# Patient Record
Sex: FEMALE | Race: WHITE | Employment: OTHER | URBAN - NONMETROPOLITAN AREA
[De-identification: names, ages, dates, MRNs, and addresses within clinical notes are randomized per-mention and may not be internally consistent; named-entity substitution may affect disease eponyms.]

---

## 2020-03-31 ENCOUNTER — HOSPITAL ENCOUNTER (OUTPATIENT)
Dept: CT IMAGING | Age: 60
Discharge: HOME | End: 2020-03-31
Payer: MEDICAID

## 2020-03-31 ENCOUNTER — TELEPHONE (OUTPATIENT)
Dept: NEUROSURGERY | Age: 60
End: 2020-03-31

## 2020-03-31 PROCEDURE — 3209999900 CT COMPARISON OF OUTSIDE FILMS

## 2020-05-05 ENCOUNTER — OFFICE VISIT (OUTPATIENT)
Dept: NEUROSURGERY | Age: 60
End: 2020-05-05
Payer: MEDICAID

## 2020-05-05 VITALS
DIASTOLIC BLOOD PRESSURE: 70 MMHG | HEIGHT: 65 IN | HEART RATE: 59 BPM | SYSTOLIC BLOOD PRESSURE: 118 MMHG | TEMPERATURE: 97.6 F | BODY MASS INDEX: 48.82 KG/M2 | WEIGHT: 293 LBS

## 2020-05-05 PROCEDURE — G8427 DOCREV CUR MEDS BY ELIG CLIN: HCPCS | Performed by: NEUROLOGICAL SURGERY

## 2020-05-05 PROCEDURE — G8417 CALC BMI ABV UP PARAM F/U: HCPCS | Performed by: NEUROLOGICAL SURGERY

## 2020-05-05 PROCEDURE — 99204 OFFICE O/P NEW MOD 45 MIN: CPT | Performed by: NEUROLOGICAL SURGERY

## 2020-05-05 PROCEDURE — 1036F TOBACCO NON-USER: CPT | Performed by: NEUROLOGICAL SURGERY

## 2020-05-05 PROCEDURE — 3017F COLORECTAL CA SCREEN DOC REV: CPT | Performed by: NEUROLOGICAL SURGERY

## 2020-05-05 RX ORDER — ATORVASTATIN CALCIUM 20 MG/1
20 TABLET, FILM COATED ORAL NIGHTLY
COMMUNITY

## 2020-05-05 RX ORDER — LOSARTAN POTASSIUM 100 MG/1
50 TABLET ORAL DAILY
COMMUNITY
Start: 2020-05-01

## 2020-05-05 RX ORDER — DULOXETIN HYDROCHLORIDE 60 MG/1
60 CAPSULE, DELAYED RELEASE ORAL DAILY
Status: ON HOLD | COMMUNITY
Start: 2020-04-30 | End: 2021-03-18

## 2020-05-05 RX ORDER — AMLODIPINE BESYLATE 5 MG/1
5 TABLET ORAL DAILY
COMMUNITY
Start: 2020-04-12

## 2020-05-05 RX ORDER — LEVETIRACETAM 500 MG/1
1000 TABLET ORAL 2 TIMES DAILY
Status: ON HOLD | COMMUNITY
Start: 2020-04-16 | End: 2021-03-29 | Stop reason: HOSPADM

## 2020-05-05 SDOH — HEALTH STABILITY: MENTAL HEALTH: HOW OFTEN DO YOU HAVE A DRINK CONTAINING ALCOHOL?: NEVER

## 2020-05-05 ASSESSMENT — ENCOUNTER SYMPTOMS
BACK PAIN: 0
TROUBLE SWALLOWING: 1
ABDOMINAL PAIN: 0
CHEST TIGHTNESS: 0

## 2020-05-05 NOTE — PROGRESS NOTES
Advised pt of xray results [facility] PROFESSIONAL SERVS  [address] [place] [address]  Dept: [phone]  Dept Fax: [phone]      Patient Name:  Jere Ledezma  Visit Date:  5/5/2020    HPI:     Ms. Anabell Paulino is a 61 y.o. female that presents today at [facility] for evaluation of the following:      Chief Complaint   Patient presents with    Consultation     Seizure, Aneurysm        HPI     This is a 80-year-old female who has a past medical history significant for ruptured intra-cerebral aneurysm in 2019. Patient experienced that brain aneurysm rupture while she is in [place] visiting her son . Apparently she underwent an endovascular treatment for her aneurysm. Patient aneurysm rupture was complicated by hydrocephalus and for that reason she underwent placement of  shunt. After she moved back to [place] she started to follow-up with her neurologist requested for her new brain CTA that showed: For that reason, he was referred to neurosurgery clinic. In today evaluation, patient denied any significant complaints. Patient denied any significant headache, pain in her vision or seizure activity at this time. Also patient denied any significant focal weakness or numbness. Patient's has a family history      Medications:  No current outpatient medications on file. The patient has no allergies on file. Past Medical History  Dagmar Shin  has no past medical history on file. Past Surgical History  The patient  has no past surgical history on file. Family History  This patient's family history is not on file. Social History  Domonique      Subjective:      Review of Systems   Constitutional: Negative for activity change. HENT: Positive for trouble swallowing (sometimes ). Eyes: Negative for visual disturbance. Respiratory: Negative for chest tightness. Cardiovascular: Negative for chest pain.    Gastrointestinal:

## 2021-03-17 ENCOUNTER — APPOINTMENT (OUTPATIENT)
Dept: CT IMAGING | Age: 61
DRG: 022 | End: 2021-03-17
Payer: MEDICAID

## 2021-03-17 ENCOUNTER — HOSPITAL ENCOUNTER (INPATIENT)
Age: 61
LOS: 12 days | Discharge: NURSING, REHAB OR LONG-TERM CARE | DRG: 022 | End: 2021-03-29
Attending: EMERGENCY MEDICINE | Admitting: FAMILY MEDICINE
Payer: MEDICAID

## 2021-03-17 ENCOUNTER — APPOINTMENT (OUTPATIENT)
Dept: GENERAL RADIOLOGY | Age: 61
DRG: 022 | End: 2021-03-17
Payer: MEDICAID

## 2021-03-17 DIAGNOSIS — D70.9 NEUTROPENIC FEVER (HCC): ICD-10-CM

## 2021-03-17 DIAGNOSIS — R41.82 ALTERED MENTAL STATUS, UNSPECIFIED ALTERED MENTAL STATUS TYPE: ICD-10-CM

## 2021-03-17 DIAGNOSIS — A41.9 SEPTICEMIA (HCC): Primary | ICD-10-CM

## 2021-03-17 DIAGNOSIS — R50.81 NEUTROPENIC FEVER (HCC): ICD-10-CM

## 2021-03-17 DIAGNOSIS — D72.819 LEUKOPENIA, UNSPECIFIED TYPE: ICD-10-CM

## 2021-03-17 DIAGNOSIS — Z98.2 VP (VENTRICULOPERITONEAL) SHUNT STATUS: ICD-10-CM

## 2021-03-17 LAB
ALBUMIN SERPL-MCNC: 2.9 G/DL (ref 3.5–5.1)
ALP BLD-CCNC: 237 U/L (ref 38–126)
ALT SERPL-CCNC: 30 U/L (ref 11–66)
ANION GAP SERPL CALCULATED.3IONS-SCNC: 11 MEQ/L (ref 8–16)
APTT: 30 SECONDS (ref 22–38)
AST SERPL-CCNC: 57 U/L (ref 5–40)
BACTERIA: ABNORMAL /HPF
BASE EXCESS MIXED: 1.9 MMOL/L (ref -2–3)
BILIRUB SERPL-MCNC: 1.2 MG/DL (ref 0.3–1.2)
BILIRUBIN URINE: ABNORMAL
BLOOD, URINE: NEGATIVE
BUN BLDV-MCNC: 10 MG/DL (ref 7–22)
CALCIUM IONIZED SERUM: 1.34 MMOL/L (ref 1.12–1.32)
CALCIUM SERPL-MCNC: 9.4 MG/DL (ref 8.5–10.5)
CASTS 2: ABNORMAL /LPF
CASTS UA: ABNORMAL /LPF
CHARACTER, URINE: ABNORMAL
CHLORIDE BLD-SCNC: 100 MEQ/L (ref 98–111)
CHLORIDE, WHOLE BLOOD: 102 MEQ/L (ref 98–109)
CO2: 22 MEQ/L (ref 23–33)
COLLECTED BY:: NORMAL
COLOR: ABNORMAL
CREAT SERPL-MCNC: 0.5 MG/DL (ref 0.4–1.2)
CRYSTALS, UA: ABNORMAL
EPITHELIAL CELLS, UA: ABNORMAL /HPF
FLU A ANTIGEN: NEGATIVE
FLU B ANTIGEN: NEGATIVE
GFR SERPL CREATININE-BSD FRML MDRD: > 90 ML/MIN/1.73M2
GLUCOSE BLD-MCNC: 135 MG/DL (ref 70–108)
GLUCOSE URINE: NEGATIVE MG/DL
GLUCOSE, WHOLE BLOOD: 147 MG/DL (ref 70–108)
HCO3, MIXED: 27 MMOL/L (ref 23–28)
ICTOTEST: NEGATIVE
INR BLD: 1.35 (ref 0.85–1.13)
KETONES, URINE: 15
LACTIC ACID, SEPSIS: 1.3 MMOL/L (ref 0.5–1.9)
LEUKOCYTE ESTERASE, URINE: ABNORMAL
MAGNESIUM: 1.9 MG/DL (ref 1.6–2.4)
MISCELLANEOUS 2: ABNORMAL
NITRITE, URINE: NEGATIVE
O2 SAT, MIXED: 71 %
OSMOLALITY CALCULATION: 267.5 MOSMOL/KG (ref 275–300)
PCO2, MIXED VENOUS: 43 MMHG (ref 41–51)
PH UA: 6.5 (ref 5–9)
PH, MIXED: 7.41 (ref 7.31–7.41)
PO2 MIXED: 37 MMHG (ref 25–40)
POC CREATININE WHOLE BLOOD: 0.6 MG/DL (ref 0.5–1.2)
POC LACTIC ACID: 1.4 MMOL/L (ref 0.5–1.9)
POTASSIUM SERPL-SCNC: 4.5 MEQ/L (ref 3.5–5.2)
POTASSIUM, WHOLE BLOOD: 4.4 MEQ/L (ref 3.5–4.9)
PRO-BNP: 3470 PG/ML (ref 0–900)
PROCALCITONIN: 0.3 NG/ML (ref 0.01–0.09)
PROTEIN UA: 100
RBC URINE: ABNORMAL /HPF
RENAL EPITHELIAL, UA: ABNORMAL
SARS-COV-2, NAAT: NOT DETECTED
SCAN OF BLOOD SMEAR: NORMAL
SODIUM BLD-SCNC: 133 MEQ/L (ref 135–145)
SODIUM, WHOLE BLOOD: 137 MEQ/L (ref 138–146)
SPECIFIC GRAVITY, URINE: 1.03 (ref 1–1.03)
TOTAL PROTEIN: 7.5 G/DL (ref 6.1–8)
TROPONIN T: < 0.01 NG/ML
UROBILINOGEN, URINE: 2 EU/DL (ref 0–1)
WBC UA: ABNORMAL /HPF
YEAST: ABNORMAL

## 2021-03-17 PROCEDURE — 6360000002 HC RX W HCPCS: Performed by: EMERGENCY MEDICINE

## 2021-03-17 PROCEDURE — 87635 SARS-COV-2 COVID-19 AMP PRB: CPT

## 2021-03-17 PROCEDURE — 85610 PROTHROMBIN TIME: CPT

## 2021-03-17 PROCEDURE — 71250 CT THORAX DX C-: CPT

## 2021-03-17 PROCEDURE — 2580000003 HC RX 258: Performed by: EMERGENCY MEDICINE

## 2021-03-17 PROCEDURE — 84145 PROCALCITONIN (PCT): CPT

## 2021-03-17 PROCEDURE — 84132 ASSAY OF SERUM POTASSIUM: CPT

## 2021-03-17 PROCEDURE — 87040 BLOOD CULTURE FOR BACTERIA: CPT

## 2021-03-17 PROCEDURE — 36415 COLL VENOUS BLD VENIPUNCTURE: CPT

## 2021-03-17 PROCEDURE — 84295 ASSAY OF SERUM SODIUM: CPT

## 2021-03-17 PROCEDURE — 82435 ASSAY OF BLOOD CHLORIDE: CPT

## 2021-03-17 PROCEDURE — 70450 CT HEAD/BRAIN W/O DYE: CPT

## 2021-03-17 PROCEDURE — 83605 ASSAY OF LACTIC ACID: CPT

## 2021-03-17 PROCEDURE — 83735 ASSAY OF MAGNESIUM: CPT

## 2021-03-17 PROCEDURE — 74176 CT ABD & PELVIS W/O CONTRAST: CPT

## 2021-03-17 PROCEDURE — 82565 ASSAY OF CREATININE: CPT

## 2021-03-17 PROCEDURE — 96365 THER/PROPH/DIAG IV INF INIT: CPT

## 2021-03-17 PROCEDURE — 87086 URINE CULTURE/COLONY COUNT: CPT

## 2021-03-17 PROCEDURE — 80053 COMPREHEN METABOLIC PANEL: CPT

## 2021-03-17 PROCEDURE — 84484 ASSAY OF TROPONIN QUANT: CPT

## 2021-03-17 PROCEDURE — 71045 X-RAY EXAM CHEST 1 VIEW: CPT

## 2021-03-17 PROCEDURE — 96361 HYDRATE IV INFUSION ADD-ON: CPT

## 2021-03-17 PROCEDURE — 82803 BLOOD GASES ANY COMBINATION: CPT

## 2021-03-17 PROCEDURE — 85025 COMPLETE CBC W/AUTO DIFF WBC: CPT

## 2021-03-17 PROCEDURE — 99285 EMERGENCY DEPT VISIT HI MDM: CPT

## 2021-03-17 PROCEDURE — 83880 ASSAY OF NATRIURETIC PEPTIDE: CPT

## 2021-03-17 PROCEDURE — 6370000000 HC RX 637 (ALT 250 FOR IP): Performed by: EMERGENCY MEDICINE

## 2021-03-17 PROCEDURE — 51701 INSERT BLADDER CATHETER: CPT

## 2021-03-17 PROCEDURE — 85730 THROMBOPLASTIN TIME PARTIAL: CPT

## 2021-03-17 PROCEDURE — 2060000000 HC ICU INTERMEDIATE R&B

## 2021-03-17 PROCEDURE — 82330 ASSAY OF CALCIUM: CPT

## 2021-03-17 PROCEDURE — 82947 ASSAY GLUCOSE BLOOD QUANT: CPT

## 2021-03-17 PROCEDURE — 81001 URINALYSIS AUTO W/SCOPE: CPT

## 2021-03-17 PROCEDURE — 87804 INFLUENZA ASSAY W/OPTIC: CPT

## 2021-03-17 RX ORDER — ACETAMINOPHEN 650 MG/1
650 SUPPOSITORY RECTAL ONCE
Status: COMPLETED | OUTPATIENT
Start: 2021-03-17 | End: 2021-03-17

## 2021-03-17 RX ORDER — 0.9 % SODIUM CHLORIDE 0.9 %
1000 INTRAVENOUS SOLUTION INTRAVENOUS ONCE
Status: COMPLETED | OUTPATIENT
Start: 2021-03-17 | End: 2021-03-18

## 2021-03-17 RX ORDER — 0.9 % SODIUM CHLORIDE 0.9 %
1000 INTRAVENOUS SOLUTION INTRAVENOUS ONCE
Status: COMPLETED | OUTPATIENT
Start: 2021-03-17 | End: 2021-03-17

## 2021-03-17 RX ADMIN — PIPERACILLIN AND TAZOBACTAM 3375 MG: 3; .375 INJECTION, POWDER, FOR SOLUTION INTRAVENOUS at 23:33

## 2021-03-17 RX ADMIN — SODIUM CHLORIDE 1000 ML: 9 INJECTION, SOLUTION INTRAVENOUS at 19:00

## 2021-03-17 RX ADMIN — ACETAMINOPHEN 650 MG: 650 SUPPOSITORY RECTAL at 23:30

## 2021-03-17 RX ADMIN — SODIUM CHLORIDE 1000 ML: 9 INJECTION, SOLUTION INTRAVENOUS at 23:37

## 2021-03-17 NOTE — ED PROVIDER NOTES
Physician who either signs or Co-signsthis chart in the absence of a cardiologist.  Interpreted by me  No acute changes  NSR, normal IN and QRS, normal QT  ST & T wave abnormality, consider anterior ischemia  Abnormal ECG  No previous ECGs available    RADIOLOGY: non-plain film images(s) such as CT, Ultrasound and MRI are read by the radiologist.  LubaJaycee LAYTONavelinoabbe 82   Final Result   1. No acute intrathoracic findings. 2.  Minimal consolidation along the right lung base posteriorly, likely    representing atelectasis. This document has been electronically signed by: Sawyer Perez MD on    03/17/2021 11:10 PM      All CTs at this facility use dose modulation techniques and iterative    reconstructions, and/or weight-based dosing   when appropriate to reduce radiation to a low as reasonably achievable. CT ABDOMEN PELVIS WO CONTRAST Additional Contrast? None   Final Result   1. No acute intra-abdominal or pelvic findings. No bowel obstruction. No renal obstruction. 2.  Small amount of fluid present within the lower abdomen/pelvis. This    is likely secondary to the ventriculoperitoneal shunt which is in close    proximity to this region. This document has been electronically signed by: Sawyer Perez MD on    03/17/2021 11:03 PM      All CTs at this facility use dose modulation techniques and iterative    reconstructions, and/or weight-based dosing   when appropriate to reduce radiation to a low as reasonably achievable. XR SHUNT SERIES PLACEMENT (<4 VIEWS)   Final Result   1. There is a right-sided  shunt catheter. The visualized portions of the catheter. Intact. However, there is partial exclusion of the inferior aspect of the catheter overlying the lower pelvis. 2. There are mildly dilated gas-filled small bowel loops overlying the mid abdomen. Differential considerations would include a possible focal ileus or partial small bowel obstruction.  Consider further characterization with CT. **This report has been created using voice recognition software. It may contain minor errors which are inherent in voice recognition technology. **      Final report electronically signed by Dr. Sara Kuhn on 3/17/2021 8:39 PM      CT HEAD WO CONTRAST   Final Result   1. No acute intracranial hemorrhage or mass effect. 2. There is subcortical white matter hypoattenuation demonstrated at the level of the left frontal lobe as seen on axial images 20/7/2029. This may represent an age-indeterminate infarct. Although a remote infarct is favored given the appearance of    encephalomalacia on coronal series image 22.      3. There is moderate ventriculomegaly demonstrated. The overall degree of ventriculomegaly appears slightly progressed at the level of the anterior left lateral ventricle when compared to the prior examination dated 3/11/2020.      4. There is an anterior rightward approach ventricular peritoneal shunt catheter with the distal tip abutting the septum pellucidum on axial image 20. This is similar in position when compared to prior examination. This slightly deviates the septum    pellucidum towards the left by approximately 4 mm. However, there is no evidence of midline shift at the level of the pineal gland. **This report has been created using voice recognition software. It may contain minor errors which are inherent in voice recognition technology. **      Final report electronically signed by Dr. Sara Kuhn on 3/17/2021 8:34 PM          LABS:   Results for orders placed or performed during the hospital encounter of 03/17/21   Rapid influenza A/B antigens    Specimen: Nasopharyngeal   Result Value Ref Range    Flu A Antigen Negative NEGATIVE    Flu B Antigen Negative NEGATIVE   COVID-19, Rapid   Result Value Ref Range    SARS-CoV-2, NAAT NOT DETECTED NOT DETECTED   CBC Auto Differential   Result Value Ref Range    WBC 0.4 (LL) 4.8 - 10.8 thou/mm3    RBC 4.28 4.20 - 5.40 mill/mm3    Hemoglobin 13.9 12.0 - 16.0 gm/dl    Hematocrit 42.6 37.0 - 47.0 %    MCV 99.5 (H) 81.0 - 99.0 fL    MCH 32.5 26.0 - 33.0 pg    MCHC 32.6 32.2 - 35.5 gm/dl    RDW-CV 14.6 (H) 11.5 - 14.5 %    RDW-SD 53.3 (H) 35.0 - 45.0 fL    Platelets 034 613 - 277 thou/mm3    MPV 11.8 9.4 - 12.4 fL   Comprehensive Metabolic Panel   Result Value Ref Range    Glucose 135 (H) 70 - 108 mg/dL    CREATININE 0.5 0.4 - 1.2 mg/dL    BUN 10 7 - 22 mg/dL    Sodium 133 (L) 135 - 145 meq/L    Potassium 4.5 3.5 - 5.2 meq/L    Chloride 100 98 - 111 meq/L    CO2 22 (L) 23 - 33 meq/L    Calcium 9.4 8.5 - 10.5 mg/dL    AST 57 (H) 5 - 40 U/L    Alkaline Phosphatase 237 (H) 38 - 126 U/L    Total Protein 7.5 6.1 - 8.0 g/dL    Albumin 2.9 (L) 3.5 - 5.1 g/dL    Total Bilirubin 1.2 0.3 - 1.2 mg/dL    ALT 30 11 - 66 U/L   Brain Natriuretic Peptide   Result Value Ref Range    Pro-BNP 3470.0 (H) 0.0 - 900.0 pg/mL   Troponin   Result Value Ref Range    Troponin T < 0.010 ng/ml   APTT   Result Value Ref Range    aPTT 30.0 22.0 - 38.0 seconds   Protime-INR   Result Value Ref Range    INR 1.35 (H) 0.85 - 1.13   Procalcitonin   Result Value Ref Range    Procalcitonin 0.30 (H) 0.01 - 0.09 ng/mL   Lactate, Sepsis   Result Value Ref Range    Lactic Acid, Sepsis 1.3 0.5 - 1.9 mmol/L   Magnesium   Result Value Ref Range    Magnesium 1.9 1.6 - 2.4 mg/dL   Blood gas, venous   Result Value Ref Range    PH MIXED 7.41 7.31 - 7.41    PCO2, MIXED VENOUS 43 41 - 51 mmhg    PO2, Mixed 37 25 - 40 mmhg    HCO3, Mixed 27 23 - 28 mmol/l    Base Exc, Mixed 1.9 -2.0 - 3.0 mmol/l    O2 Sat, Mixed 71 %    COLLECTED BY: [id number]    Potassium, Whole Blood   Result Value Ref Range    Potassium, Whole Blood 4.4 3.5 - 4.9 meq/l   Chloride, Whole Blood   Result Value Ref Range    Chloride, Whole Blood 102 98 - 109 meq/l   Calcium Ionized Serum   Result Value Ref Range    Calcium,Ionized 1.34 (H) 1.12 - 1.32 mmol/L   POC Lactic Acid, Venous   Result Value Ref Range    POC Lactic Value Ref Range    POC CREATININE WHOLE BLOOD 0.6 0.5 - 1.2 mg/dl       EMERGENCY DEPARTMENT COURSE:   Vitals:    Vitals:    03/17/21 2035 03/17/21 2152 03/17/21 2258 03/17/21 2336   BP: (!) 149/78 (!) 146/83  (!) 151/79   Pulse: 92 94  89   Resp: 16 16  16   Temp:  103.1 °F (39.5 °C)     TempSrc:  Axillary     SpO2: 96% 98%  97%   Weight:       Height:   5' 5\" (1.651 m)      7:03 PM: Patient is seen and evaluated in a timely fashion. MEDICAL DECISION MAKINGS:    ED workups: She has leukopenia (WBC 0.4) which is confirmed by repeated CBC. UA has possible mild pyuria. Otherwise ED labs and reassuring. Flu and Covid-19 neg. CXR has no acute changes. CT head:   1. No acute intracranial hemorrhage or mass effect. 2. There is subcortical white matter hypoattenuation demonstrated at the level of the left frontal lobe as seen on axial images 20/7/2029. This may represent an age-indeterminate infarct. Although a remote infarct is favored given the appearance of encephalomalacia on coronal series image 22.    3. There is moderate ventriculomegaly demonstrated. The overall degree of ventriculomegaly appears slightly progressed at the level of the anterior left lateral ventricle when compared to the prior examination dated 3/11/2020.    4. There is an anterior rightward approach ventricular peritoneal shunt catheter with the distal tip abutting the septum pellucidum on axial image 20. This is similar in position when compared to prior examination. This slightly deviates the septum pellucidum towards the left by approximately 4 mm. However, there is no evidence of midline shift at the level of the pineal gland. CT chest, adomen and pelvis:  1. No acute intrathoracic findings. 2.  Minimal consolidation along the right lung base posteriorly, likely representing atelectasis. 3  No acute intra-abdominal or pelvic findings. No bowel obstruction. No renal obstruction.   4. Small amount of fluid present within the lower abdomen/pelvis. This is likely secondary to the ventriculoperitoneal shunt which is in close proximity to this region. She spiked fever in ED. She qualifies for SIRS, suspected sepsis. No clear source of infection.  shunt infection has to be ruled out. Zosyn and Vancomycin are administered. NS bolus 30 ml/kg based on ideal BW. Tylenol suppository for fever. Overall it seems mentation change may be secondary to  shunt malfunction vs shunt infection vs febrile illness (viral?). Discussed and admitted to hospitalist service. CRITICAL CARE:   CRITICAL CARE: There was a high probability of clinically significant/life threatening deterioration in this patient's condition which required my urgent intervention. Total critical care time was 30 minutes. This excludes any time for separately reportable procedures. CONSULTS:  Hospitalist    PROCEDURES:  None    FINAL IMPRESSION      1. Septicemia ([address]    2. Altered mental status, unspecified altered mental status type    3. Leukopenia, unspecified type    4. Neutropenic fever ([address]    5.  (ventriculoperitoneal) shunt status          DISPOSITION/PLAN   Admit    PATIENT REFERRED TO:  No follow-up provider specified.     DISCHARGE MEDICATIONS:  Current Discharge Medication List          (Please note that portions of this note were completed with a voice recognition program.  Efforts were made to edit the dictations but occasionally words aremis-transcribed.)    MD Domitila Smith MD  03/18/21 0448

## 2021-03-17 NOTE — ED TRIAGE NOTES
Pt presents to ED with AMS. Per report pt has been having cognitive decline since December with increased AMS that started on Sunday and has not eaten for the past several days. Pt has hx of brain aneurism and has shunt in place. Pt neurologist concerned that something may be malfunctioning with the shunt. Pt has eye open and talking nonsense. Family at bedside.

## 2021-03-17 NOTE — ED NOTES
ED nurse-to-nurse bedside report    Chief Complaint   Patient presents with    Altered Mental Status      LOC: alert and orientated to name, place, date  Vital signs   Vitals:    03/17/21 1823   BP: 128/73   Pulse: 85   Resp: 19   Temp: 101.7 °F (38.7 °C)   TempSrc: Oral   SpO2: 97%   Weight: 300 lb (136.1 kg)      Pain:    Pain Interventions: none  Pain Goal: VIDAL  Oxygen: No    Current needs required room air   Telemetry: Yes  LDAs:   Peripheral IV 03/17/21 Left Forearm (Active)   Site Assessment Clean;Dry; Intact 03/17/21 1854   Line Status Blood return noted;Normal saline locked 03/17/21 1854   Dressing Status Clean; Intact;Dry 03/17/21 1854   Dressing Intervention New 03/17/21 1854     Continuous Infusions:   Mobility: Fully dependent  Mckinnon Fall Risk Score: No flowsheet data found.   Fall Interventions: fall band bed alarm family at bedside  Report given to: Hyland Sicard, RN Minetta Linear, RN  03/17/21 6172

## 2021-03-18 LAB
ALBUMIN SERPL-MCNC: 3.4 G/DL (ref 3.5–5.1)
ALP BLD-CCNC: 295 U/L (ref 38–126)
ALT SERPL-CCNC: 51 U/L (ref 11–66)
AMMONIA: 25 UMOL/L (ref 11–60)
ANION GAP SERPL CALCULATED.3IONS-SCNC: 15 MEQ/L (ref 8–16)
AST SERPL-CCNC: 129 U/L (ref 5–40)
BASOPHILS # BLD: 0 %
BASOPHILS # BLD: 0 %
BASOPHILS # BLD: 1.3 %
BASOPHILS ABSOLUTE: 0 THOU/MM3 (ref 0–0.1)
BILIRUB SERPL-MCNC: 2.1 MG/DL (ref 0.3–1.2)
BUN BLDV-MCNC: 10 MG/DL (ref 7–22)
CALCIUM SERPL-MCNC: 10.1 MG/DL (ref 8.5–10.5)
CHLORIDE BLD-SCNC: 99 MEQ/L (ref 98–111)
CO2: 22 MEQ/L (ref 23–33)
CREAT SERPL-MCNC: 0.6 MG/DL (ref 0.4–1.2)
EKG ATRIAL RATE: 80 BPM
EKG P AXIS: 62 DEGREES
EKG P-R INTERVAL: 114 MS
EKG Q-T INTERVAL: 340 MS
EKG QRS DURATION: 78 MS
EKG QTC CALCULATION (BAZETT): 392 MS
EKG R AXIS: 23 DEGREES
EKG T AXIS: 81 DEGREES
EKG VENTRICULAR RATE: 80 BPM
EOSINOPHIL # BLD: 0 %
EOSINOPHILS ABSOLUTE: 0 THOU/MM3 (ref 0–0.4)
ERYTHROCYTE [DISTWIDTH] IN BLOOD BY AUTOMATED COUNT: 14.6 % (ref 11.5–14.5)
ERYTHROCYTE [DISTWIDTH] IN BLOOD BY AUTOMATED COUNT: 14.6 % (ref 11.5–14.5)
ERYTHROCYTE [DISTWIDTH] IN BLOOD BY AUTOMATED COUNT: 14.8 % (ref 11.5–14.5)
ERYTHROCYTE [DISTWIDTH] IN BLOOD BY AUTOMATED COUNT: 53.1 FL (ref 35–45)
ERYTHROCYTE [DISTWIDTH] IN BLOOD BY AUTOMATED COUNT: 53.3 FL (ref 35–45)
ERYTHROCYTE [DISTWIDTH] IN BLOOD BY AUTOMATED COUNT: 58.1 FL (ref 35–45)
FOLATE: 6.5 NG/ML (ref 4.8–24.2)
GFR SERPL CREATININE-BSD FRML MDRD: > 90 ML/MIN/1.73M2
GLUCOSE BLD-MCNC: 136 MG/DL (ref 70–108)
HCT VFR BLD CALC: 42.3 % (ref 37–47)
HCT VFR BLD CALC: 42.6 % (ref 37–47)
HCT VFR BLD CALC: 47.3 % (ref 37–47)
HEMOGLOBIN: 13.8 GM/DL (ref 12–16)
HEMOGLOBIN: 13.9 GM/DL (ref 12–16)
HEMOGLOBIN: 14.6 GM/DL (ref 12–16)
IMMATURE GRANS (ABS): 0 THOU/MM3 (ref 0–0.07)
IMMATURE GRANS (ABS): 0 THOU/MM3 (ref 0–0.07)
IMMATURE GRANS (ABS): 0.01 THOU/MM3 (ref 0–0.07)
IMMATURE GRANULOCYTES: 0 %
IMMATURE GRANULOCYTES: 0 %
IMMATURE GRANULOCYTES: 2.8 %
LYMPHOCYTES # BLD: 82.9 %
LYMPHOCYTES # BLD: 83.3 %
LYMPHOCYTES # BLD: 87.5 %
LYMPHOCYTES ABSOLUTE: 0.3 THOU/MM3 (ref 1–4.8)
LYMPHOCYTES ABSOLUTE: 0.3 THOU/MM3 (ref 1–4.8)
LYMPHOCYTES ABSOLUTE: 0.7 THOU/MM3 (ref 1–4.8)
MCH RBC QN AUTO: 32.1 PG (ref 26–33)
MCH RBC QN AUTO: 32.5 PG (ref 26–33)
MCH RBC QN AUTO: 32.6 PG (ref 26–33)
MCHC RBC AUTO-ENTMCNC: 30.9 GM/DL (ref 32.2–35.5)
MCHC RBC AUTO-ENTMCNC: 32.6 GM/DL (ref 32.2–35.5)
MCHC RBC AUTO-ENTMCNC: 32.6 GM/DL (ref 32.2–35.5)
MCV RBC AUTO: 105.6 FL (ref 81–99)
MCV RBC AUTO: 98.4 FL (ref 81–99)
MCV RBC AUTO: 99.5 FL (ref 81–99)
MONOCYTES # BLD: 13.2 %
MONOCYTES # BLD: 8.3 %
MONOCYTES # BLD: 9.4 %
MONOCYTES ABSOLUTE: 0 THOU/MM3 (ref 0.4–1.3)
MONOCYTES ABSOLUTE: 0 THOU/MM3 (ref 0.4–1.3)
MONOCYTES ABSOLUTE: 0.1 THOU/MM3 (ref 0.4–1.3)
NUCLEATED RED BLOOD CELLS: 0 /100 WBC
NUCLEATED RED BLOOD CELLS: 0 /100 WBC
NUCLEATED RED BLOOD CELLS: 3 /100 WBC
PATHOLOGIST REVIEW: ABNORMAL
PLATELET # BLD: 102 THOU/MM3 (ref 130–400)
PLATELET # BLD: 144 THOU/MM3 (ref 130–400)
PLATELET # BLD: 150 THOU/MM3 (ref 130–400)
PLATELET ESTIMATE: ABNORMAL
PMV BLD AUTO: 11.5 FL (ref 9.4–12.4)
PMV BLD AUTO: 11.8 FL (ref 9.4–12.4)
PMV BLD AUTO: 12.2 FL (ref 9.4–12.4)
POTASSIUM REFLEX MAGNESIUM: 4.8 MEQ/L (ref 3.5–5.2)
PREALBUMIN: 8.9 MG/DL (ref 20–40)
RBC # BLD: 4.28 MILL/MM3 (ref 4.2–5.4)
RBC # BLD: 4.3 MILL/MM3 (ref 4.2–5.4)
RBC # BLD: 4.48 MILL/MM3 (ref 4.2–5.4)
SCAN OF BLOOD SMEAR: NORMAL
SEG NEUTROPHILS: 2.6 %
SEG NEUTROPHILS: 3.1 %
SEG NEUTROPHILS: 5.6 %
SEGMENTED NEUTROPHILS ABSOLUTE COUNT: 0 THOU/MM3 (ref 1.8–7.7)
SMUDGE CELLS: PRESENT
SODIUM BLD-SCNC: 136 MEQ/L (ref 135–145)
TOTAL PROTEIN: 8.6 G/DL (ref 6.1–8)
URINE CULTURE REFLEX: NORMAL
VITAMIN B-12: 447 PG/ML (ref 211–911)
WBC # BLD: 0.3 THOU/MM3 (ref 4.8–10.8)
WBC # BLD: 0.4 THOU/MM3 (ref 4.8–10.8)
WBC # BLD: 0.8 THOU/MM3 (ref 4.8–10.8)

## 2021-03-18 PROCEDURE — 97163 PT EVAL HIGH COMPLEX 45 MIN: CPT

## 2021-03-18 PROCEDURE — 82140 ASSAY OF AMMONIA: CPT

## 2021-03-18 PROCEDURE — 6370000000 HC RX 637 (ALT 250 FOR IP): Performed by: PHYSICIAN ASSISTANT

## 2021-03-18 PROCEDURE — 6360000002 HC RX W HCPCS: Performed by: EMERGENCY MEDICINE

## 2021-03-18 PROCEDURE — 2580000003 HC RX 258: Performed by: PHYSICIAN ASSISTANT

## 2021-03-18 PROCEDURE — 36415 COLL VENOUS BLD VENIPUNCTURE: CPT

## 2021-03-18 PROCEDURE — 2580000003 HC RX 258: Performed by: STUDENT IN AN ORGANIZED HEALTH CARE EDUCATION/TRAINING PROGRAM

## 2021-03-18 PROCEDURE — 93005 ELECTROCARDIOGRAM TRACING: CPT | Performed by: EMERGENCY MEDICINE

## 2021-03-18 PROCEDURE — 82607 VITAMIN B-12: CPT

## 2021-03-18 PROCEDURE — 80053 COMPREHEN METABOLIC PANEL: CPT

## 2021-03-18 PROCEDURE — 84134 ASSAY OF PREALBUMIN: CPT

## 2021-03-18 PROCEDURE — 82746 ASSAY OF FOLIC ACID SERUM: CPT

## 2021-03-18 PROCEDURE — 85025 COMPLETE CBC W/AUTO DIFF WBC: CPT

## 2021-03-18 PROCEDURE — 2060000000 HC ICU INTERMEDIATE R&B

## 2021-03-18 PROCEDURE — 6370000000 HC RX 637 (ALT 250 FOR IP): Performed by: STUDENT IN AN ORGANIZED HEALTH CARE EDUCATION/TRAINING PROGRAM

## 2021-03-18 PROCEDURE — 2580000003 HC RX 258: Performed by: EMERGENCY MEDICINE

## 2021-03-18 PROCEDURE — 6360000002 HC RX W HCPCS: Performed by: STUDENT IN AN ORGANIZED HEALTH CARE EDUCATION/TRAINING PROGRAM

## 2021-03-18 PROCEDURE — 6360000002 HC RX W HCPCS: Performed by: PHYSICIAN ASSISTANT

## 2021-03-18 PROCEDURE — 99223 1ST HOSP IP/OBS HIGH 75: CPT | Performed by: FAMILY MEDICINE

## 2021-03-18 PROCEDURE — 6360000002 HC RX W HCPCS: Performed by: SPECIALIST

## 2021-03-18 PROCEDURE — 97530 THERAPEUTIC ACTIVITIES: CPT

## 2021-03-18 PROCEDURE — 92610 EVALUATE SWALLOWING FUNCTION: CPT

## 2021-03-18 RX ORDER — ACETAMINOPHEN 650 MG/1
650 SUPPOSITORY RECTAL EVERY 6 HOURS PRN
Status: DISCONTINUED | OUTPATIENT
Start: 2021-03-18 | End: 2021-03-22

## 2021-03-18 RX ORDER — AMLODIPINE BESYLATE 5 MG/1
5 TABLET ORAL DAILY
Status: DISCONTINUED | OUTPATIENT
Start: 2021-03-18 | End: 2021-03-29 | Stop reason: HOSPADM

## 2021-03-18 RX ORDER — ONDANSETRON 4 MG/1
4 TABLET, ORALLY DISINTEGRATING ORAL EVERY 6 HOURS PRN
Status: DISCONTINUED | OUTPATIENT
Start: 2021-03-18 | End: 2021-03-22

## 2021-03-18 RX ORDER — HYDRALAZINE HYDROCHLORIDE 20 MG/ML
10 INJECTION INTRAMUSCULAR; INTRAVENOUS EVERY 6 HOURS PRN
Status: DISCONTINUED | OUTPATIENT
Start: 2021-03-18 | End: 2021-03-24

## 2021-03-18 RX ORDER — RISPERIDONE 0.25 MG/1
0.5 TABLET, FILM COATED ORAL 2 TIMES DAILY
Status: DISCONTINUED | OUTPATIENT
Start: 2021-03-18 | End: 2021-03-29 | Stop reason: HOSPADM

## 2021-03-18 RX ORDER — RISPERIDONE 0.5 MG/1
0.5 TABLET, FILM COATED ORAL 2 TIMES DAILY
Status: ON HOLD | COMMUNITY
End: 2022-10-17 | Stop reason: HOSPADM

## 2021-03-18 RX ORDER — ONDANSETRON 2 MG/ML
4 INJECTION INTRAMUSCULAR; INTRAVENOUS EVERY 6 HOURS PRN
Status: DISCONTINUED | OUTPATIENT
Start: 2021-03-18 | End: 2021-03-22

## 2021-03-18 RX ORDER — SENNA PLUS 8.6 MG/1
1 TABLET ORAL 2 TIMES DAILY
Status: DISCONTINUED | OUTPATIENT
Start: 2021-03-18 | End: 2021-03-29 | Stop reason: HOSPADM

## 2021-03-18 RX ORDER — SODIUM CHLORIDE 0.9 % (FLUSH) 0.9 %
10 SYRINGE (ML) INJECTION EVERY 12 HOURS SCHEDULED
Status: DISCONTINUED | OUTPATIENT
Start: 2021-03-18 | End: 2021-03-22 | Stop reason: SDUPTHER

## 2021-03-18 RX ORDER — ACETAMINOPHEN 325 MG/1
650 TABLET ORAL EVERY 6 HOURS PRN
Status: DISCONTINUED | OUTPATIENT
Start: 2021-03-18 | End: 2021-03-22

## 2021-03-18 RX ORDER — LEVETIRACETAM 500 MG/1
500 TABLET ORAL 2 TIMES DAILY
Status: DISCONTINUED | OUTPATIENT
Start: 2021-03-18 | End: 2021-03-18

## 2021-03-18 RX ORDER — LOSARTAN POTASSIUM 100 MG/1
100 TABLET ORAL DAILY
Status: DISCONTINUED | OUTPATIENT
Start: 2021-03-18 | End: 2021-03-29 | Stop reason: HOSPADM

## 2021-03-18 RX ORDER — ATORVASTATIN CALCIUM 20 MG/1
20 TABLET, FILM COATED ORAL NIGHTLY
Status: DISCONTINUED | OUTPATIENT
Start: 2021-03-18 | End: 2021-03-29 | Stop reason: HOSPADM

## 2021-03-18 RX ORDER — POLYETHYLENE GLYCOL 3350 17 G/17G
17 POWDER, FOR SOLUTION ORAL DAILY PRN
Status: DISCONTINUED | OUTPATIENT
Start: 2021-03-18 | End: 2021-03-29 | Stop reason: HOSPADM

## 2021-03-18 RX ORDER — SIMETHICONE 80 MG
80 TABLET,CHEWABLE ORAL EVERY 8 HOURS PRN
Status: DISCONTINUED | OUTPATIENT
Start: 2021-03-18 | End: 2021-03-29 | Stop reason: HOSPADM

## 2021-03-18 RX ORDER — SODIUM CHLORIDE, SODIUM LACTATE, POTASSIUM CHLORIDE, CALCIUM CHLORIDE 600; 310; 30; 20 MG/100ML; MG/100ML; MG/100ML; MG/100ML
INJECTION, SOLUTION INTRAVENOUS CONTINUOUS
Status: DISCONTINUED | OUTPATIENT
Start: 2021-03-18 | End: 2021-03-23

## 2021-03-18 RX ORDER — LEVETIRACETAM 500 MG/1
1000 TABLET ORAL 2 TIMES DAILY
Status: DISCONTINUED | OUTPATIENT
Start: 2021-03-18 | End: 2021-03-29 | Stop reason: HOSPADM

## 2021-03-18 RX ORDER — SENNA PLUS 8.6 MG/1
1 TABLET ORAL 2 TIMES DAILY
COMMUNITY

## 2021-03-18 RX ORDER — SODIUM CHLORIDE 0.9 % (FLUSH) 0.9 %
10 SYRINGE (ML) INJECTION PRN
Status: DISCONTINUED | OUTPATIENT
Start: 2021-03-18 | End: 2021-03-22 | Stop reason: SDUPTHER

## 2021-03-18 RX ORDER — DULOXETIN HYDROCHLORIDE 60 MG/1
60 CAPSULE, DELAYED RELEASE ORAL DAILY
Status: DISCONTINUED | OUTPATIENT
Start: 2021-03-18 | End: 2021-03-18

## 2021-03-18 RX ADMIN — VANCOMYCIN HYDROCHLORIDE 1000 MG: 1 INJECTION, POWDER, LYOPHILIZED, FOR SOLUTION INTRAVENOUS at 00:18

## 2021-03-18 RX ADMIN — VANCOMYCIN HYDROCHLORIDE 1750 MG: 5 INJECTION, POWDER, LYOPHILIZED, FOR SOLUTION INTRAVENOUS at 13:12

## 2021-03-18 RX ADMIN — CEFEPIME HYDROCHLORIDE 2000 MG: 2 INJECTION, POWDER, FOR SOLUTION INTRAVENOUS at 04:26

## 2021-03-18 RX ADMIN — PIPERACILLIN AND TAZOBACTAM 3375 MG: 3; .375 INJECTION, POWDER, LYOPHILIZED, FOR SOLUTION INTRAVENOUS at 21:06

## 2021-03-18 RX ADMIN — TBO-FILGRASTIM 480 MCG: 480 INJECTION, SOLUTION SUBCUTANEOUS at 19:48

## 2021-03-18 RX ADMIN — LEVETIRACETAM 1000 MG: 100 INJECTION INTRAVENOUS at 19:41

## 2021-03-18 RX ADMIN — ACETAMINOPHEN 650 MG: 650 SUPPOSITORY RECTAL at 04:31

## 2021-03-18 RX ADMIN — NYSTATIN 500000 UNITS: 100000 SUSPENSION ORAL at 18:35

## 2021-03-18 RX ADMIN — NYSTATIN 500000 UNITS: 100000 SUSPENSION ORAL at 21:07

## 2021-03-18 RX ADMIN — PIPERACILLIN AND TAZOBACTAM 3375 MG: 3; .375 INJECTION, POWDER, LYOPHILIZED, FOR SOLUTION INTRAVENOUS at 13:12

## 2021-03-18 RX ADMIN — SODIUM CHLORIDE, POTASSIUM CHLORIDE, SODIUM LACTATE AND CALCIUM CHLORIDE: 600; 310; 30; 20 INJECTION, SOLUTION INTRAVENOUS at 18:34

## 2021-03-18 ASSESSMENT — PAIN SCALES - GENERAL
PAINLEVEL_OUTOF10: 0
PAINLEVEL_OUTOF10: 0

## 2021-03-18 NOTE — PLAN OF CARE
Problem: Skin Integrity:  Goal: Will show no infection signs and symptoms  Description: Will show no infection signs and symptoms  Outcome: Ongoing  Note: Patient has stage II pressure injury on buttocks. Skin is dry and peeling. Mucous membranes pink and moist.  Assistance with turns/ambulation provided PRN. Will continue to monitor. Problem: Falls - Risk of:  Goal: Will remain free from falls  Description: Will remain free from falls  Outcome: Ongoing  Note: Patient remained free from falls this shift. Bed is in low position with alarm on and siderails up x2. Education given on use of call light and patient voiced understanding. Call light and beside table within reach. Arm band and falling star in place. Hourly rounds completed. Will continue to monitor. Problem: Confusion - Acute:  Goal: Absence of continued neurological deterioration signs and symptoms  Description: Absence of continued neurological deterioration signs and symptoms  Outcome: Ongoing  Note: Patient here for AMS. Patient is oriented to name and disoriented to place, time, situation. Neuro checks performed every 4 hours. Will continue to monitor. Problem: Discharge Planning:  Goal: Ability to perform activities of daily living will improve  Description: Ability to perform activities of daily living will improve  Outcome: Ongoing  Note: Discharge planning in process and discussed with patient/family. Social work consulted for any additional needs. Care manager aware of discharge needs. Patient from [facility]. Problem: Injury - Risk of, Physical Injury:  Goal: Will remain free from falls  Description: Will remain free from falls  Outcome: Ongoing  Note: Patient remained free from falls this shift. Bed is in low position with alarm on and siderails up x2. Education given on use of call light and patient voiced understanding. Call light and beside table within reach. Arm band and falling star in place. Hourly rounds completed.  Will continue to monitor. Problem: Pain:  Goal: Pain level will decrease  Description: Pain level will decrease  Outcome: Ongoing  Note: Patient able to use 0-10 pain scale. Denies pain at this time. Agreeable to take PRN pain medications. Care plan reviewed with patient. Patient verbalizes understanding of the plan of care and contributed to goal setting.

## 2021-03-18 NOTE — ED NOTES
ED to inpatient nurses report    Chief Complaint   Patient presents with    Altered Mental Status      Present to ED from [facility]  LOC: Not alert  Vital signs   Vitals:    03/17/21 2035 03/17/21 2152 03/17/21 2258 03/17/21 2336   BP: (!) 149/78 (!) 146/83  (!) 151/79   Pulse: 92 94  89   Resp: 16 16  16   Temp:  103.1 °F (39.5 °C)     TempSrc:  Axillary     SpO2: 96% 98%  97%   Weight:       Height:   5' 5\" (1.651 m)       Oxygen Baseline room air     Current needs required none Bipap/Cpap No  LDAs:   Peripheral IV 03/17/21 Left Forearm (Active)   Site Assessment Clean;Dry; Intact 03/17/21 1854   Line Status Blood return noted;Normal saline locked 03/17/21 1854   Dressing Status Clean; Intact;Dry 03/17/21 1854   Dressing Intervention New 03/17/21 1854     Mobility: Requires assistance * 2  Pending ED orders: None  Present condition: Stable     Electronically signed by DONALD Grayson on 3/18/2021 at 12:19 AM       DONALD Grayson  03/18/21 0020

## 2021-03-18 NOTE — PROGRESS NOTES
Pharmacy Note  Vancomycin Consult    Irving  is a 61 y.o. female started on Vancomycin for neutropenic fever/sepsis; consult received from Dr. Clark Almeida to manage therapy. Also receiving the following antibiotics: zosyn. Patient Active Problem List   Diagnosis    Neutropenic fever ([facility] [address]     Allergies:  Geodon [ziprasidone]     Temp max: 103    Recent Labs     03/17/21  1925 03/17/21  2105 03/18/21  0424   BUN 10  --  10   CREATININE 0.5  --  0.6   WBC 0.4* 0.3* 0.8*     No intake or output data in the 24 hours ending 03/18/21 0951  Culture Date Source Results   03/17/21 Blood x 2 ngtd   03/17/21 urine ngtd              Ht Readings from Last 1 Encounters:   03/18/21 5' 6\" (1.676 m)        Wt Readings from Last 1 Encounters:   03/18/21 274 lb 8 oz (124.5 kg)       Body mass index is 44.31 kg/m². Estimated Creatinine Clearance: 134 mL/min (based on SCr of 0.6 mg/dL). Goal Trough Level: 15-20 mcg/mL    Assessment/Plan:  Patient received vancomycin 1000 mg IV 3/18/21 @ 0018. Will initiate vancomycin 1750 mg IV every 12 hours. Timing of trough level will be determined based on culture results, renal function, and clinical response. Thank you for the consult. Will continue to follow.     Ady Silveira, PharmD, BCPS  3/18/2021  9:51 AM

## 2021-03-18 NOTE — H&P
HISTORY & PHYSICAL       Patient:  Mean Titus  YOB: 1960  MRN: [id number]     Acct: [de-identified]    PCP: Awilda Bustos MD    Date of Admission: 3/17/2021    Date of Service: Pt seen/examined in ED with expected LOS greater than two midnights due to medical therapy. ASSESSMENT/PLAN:    1. Acute Encephalopathy, Sepsis vs  shunt malfunction  · Progressively worsening for 1 week. · At baseline, patient is able to speak but does have difficulty concentrating, making decisions, and remembering. Has progressively lost ability to walk since December, was using a cane in 2/23/2021. · Neuro checks. 2. Sepsis, intra-abdominal infection ( shunt) vs UTI  · WBC 0.4, repeat 0.3 on arrival.   Fever 101.7 °F.  Procal 0.30. Lactic acid WNL.  Blood & urine cultures pending. MRSA pending.  Consult general surgery to paracentesis and culture.  Vanc (neutropenic)  + zosyn.  Received 30 ml/kg fluid resuscitation. 3. History of Ruptured Intra-Cerebral Aneurysm , s/p  Shunt 2019   Seen by Neuro 05/2020 for postsurgical changes with aneurysm coiling at the level of the anterior communicating artery.  Sent to [facility] ED by neurologist for concerns of shunt dysfunction.  XR shunt series 3/17:  shunt catheter is visualized and intact. However, there is partial exclusion of the inferior aspect of catheter overlying the lower pelvis.  CT abdomen pelvis 3/17: No acute intra-abdominal or pelvic findings. No bowel obstruction. No renal obstruction. Small amount of fluid present within the lower abdomen/pelvis. Likely secondary to the  shunt, which is in close proximity to this region. [facility] consulted. 4. Severe Neutropenia, secondary to sepsis vs. Undiagnosed ?leukemia   Preliminary, CBC Auto Differential:  Segs 3, Lymphs 88, Monos 9.   Per family, no history of cancer or chemotherapy.  Neutropenic precautions in place.    Peripheral blood smear from 3/11/2020. Some subcortical white matter hypo-attenuation demonstrated at the level of he left frontal lobe, which could represent an age-indeterminate infarct. Some fluid noted in pelvis near shunt, could benefit from paracentesis and culture. Past Medical History:          Diagnosis Date    Brain aneurysm     Hypertension     Seizures ([facility] [address]        Past Surgical History:          Procedure Laterality Date     SECTION      x2    GALLBLADDER SURGERY      partial        Medications Prior to Admission:      Prior to Admission medications    Medication Sig Start Date End Date Taking? Authorizing Provider   risperiDONE (RISPERDAL) 0.5 MG tablet Take 0.5 mg by mouth 2 times daily    Historical Provider, MD   senna (SENOKOT) 8.6 MG tablet Take 1 tablet by mouth 2 times daily    Historical Provider, MD   amLODIPine (NORVASC) 5 MG tablet Take 5 mg by mouth daily 20   Historical Provider, MD   DULoxetine (CYMBALTA) 60 MG extended release capsule Take 60 mg by mouth daily 20   Historical Provider, MD   levETIRAcetam (KEPPRA) 500 MG tablet Take 500 mg by mouth 2 times daily 20   Historical Provider, MD   atorvastatin (LIPITOR) 20 MG tablet Take 20 mg by mouth nightly    Historical Provider, MD   losartan (COZAAR) 100 MG tablet Take 100 mg by mouth daily 20   Historical Provider, MD   Simethicone 80 MG TABS Take 80 mg by mouth every 8 hours as needed    Historical Provider, MD       Allergies:  Geodon [ziprasidone]    Social History:      The patient currently lives at [facility]. TOBACCO:   reports that she quit smoking about 5 years ago. She has never used smokeless tobacco.  ETOH:   reports no history of alcohol use. Family History:      Reviewed in detail for DM, CAD, Cancer, CVA.  Positive as follows:        Problem Relation Age of Onset   [facility] Cancer Mother         Breast, Lung    Diabetes Father     Cancer Father         colon    Alzheimer's Disease Father     ADHD Father        Diet:  DIET GENERAL; Low Microbial    Constitutional: Negative for chills and fever. HENT: Negative for congestion and sore throat. Eyes: Negative for visual disturbance. Respiratory: Negative for cough and shortness of breath. Cardiovascular: Negative for chest pain. Gastrointestinal: Negative for abdominal pain and nausea. Genitourinary: Negative for dysuria. Skin: Negative for rash. Neurological: Negative for dizziness, light-headedness and headaches. Psychiatric/Behavioral: The patient is not nervous/anxious. PHYSICAL EXAM:    /64   Pulse 91   Temp 102.7 °F (39.3 °C) (Rectal)   Resp 24   Ht 5' 6\" (1.676 m)   Wt 274 lb 8 oz (124.5 kg)   SpO2 93%   BMI 44.31 kg/m²     Constitutional:       General: she is not in acute distress. Appearance: mumbling to self. Opens eyes only to stimulation. HENT:      Head: Normocephalic. Right Ear: External ear normal.      Left Ear: External ear normal.      Nose: Nose normal.      Mouth/Throat:      Mouth: Mucous membranes are dry. Eyes:      General: No scleral icterus. Extraocular Movements: Extraocular movements intact. Neck:      Musculoskeletal: unable to assess. Cardiovascular:      Rate and Rhythm: Normal rate and regular rhythm. Pulses: Normal pulses. Heart sounds: Normal heart sounds. Pulmonary:      Effort: Pulmonary effort is normal.      Breath sounds: Normal breath sounds. Abdominal:      General: Abdomen is flat. There is no distension. Palpations: Abdomen is soft. May grimace on palpation, but does not withdraw. Musculoskeletal: unable to assess  Skin:     General: Skin is warm and dry, scaling. 3+ pitting edema to mid shin. Neurological:      Mental Status: she is mumbling to self and grimacing. Motor: Unable to assess. Psychiatric:         Mood and Affect: Unable to assess.     Labs:     Recent Labs     03/17/21  1925 03/17/21  2105   WBC 0.4* 0.3*   HGB 13.9 13.8   HCT 42.6 42.3    150     Recent Labs     03/17/21 1925 03/17/21 1946   * 137*   K 4.5 4.4     --    CO2 22*  --    BUN 10  --    CREATININE 0.5  --    CALCIUM 9.4  --      Recent Labs     03/17/21 1925   AST 57*   ALT 30   BILITOT 1.2   ALKPHOS 237*     Recent Labs     03/17/21 1945   INR 1.35*     No results for input(s): Lily Estrada in the last 72 hours. Urinalysis:      Lab Results   Component Value Date    NITRU NEGATIVE 03/17/2021    WBCUA 10-15 03/17/2021    BACTERIA MANY 03/17/2021    RBCUA 3-5 03/17/2021    BLOODU NEGATIVE 03/17/2021    GLUCOSEU NEGATIVE 03/17/2021       Intake & Output:  No intake/output data recorded. No intake/output data recorded. Radiology:     CT CHEST WO CONTRAST   Final Result   1. No acute intrathoracic findings. 2.  Minimal consolidation along the right lung base posteriorly, likely    representing atelectasis. This document has been electronically signed by: Silva Dunlap MD on    03/17/2021 11:10 PM      All CTs at this facility use dose modulation techniques and iterative    reconstructions, and/or weight-based dosing   when appropriate to reduce radiation to a low as reasonably achievable. CT ABDOMEN PELVIS WO CONTRAST Additional Contrast? None   Final Result   1. No acute intra-abdominal or pelvic findings. No bowel obstruction. No renal obstruction. 2.  Small amount of fluid present within the lower abdomen/pelvis. This    is likely secondary to the ventriculoperitoneal shunt which is in close    proximity to this region. This document has been electronically signed by: Silva Dunlap MD on    03/17/2021 11:03 PM      All CTs at this facility use dose modulation techniques and iterative    reconstructions, and/or weight-based dosing   when appropriate to reduce radiation to a low as reasonably achievable. XR SHUNT SERIES PLACEMENT (<4 VIEWS)   Final Result   1.  There is a right-sided  shunt catheter. The visualized portions of the catheter. Intact. However, there is partial exclusion of the inferior aspect of the catheter overlying the lower pelvis. 2. There are mildly dilated gas-filled small bowel loops overlying the mid abdomen. Differential considerations would include a possible focal ileus or partial small bowel obstruction. Consider further characterization with CT. **This report has been created using voice recognition software. It may contain minor errors which are inherent in voice recognition technology. **      Final report electronically signed by Dr. Gregor Da Silva on 3/17/2021 8:39 PM      CT HEAD WO CONTRAST   Final Result   1. No acute intracranial hemorrhage or mass effect. 2. There is subcortical white matter hypoattenuation demonstrated at the level of the left frontal lobe as seen on axial images 20/7/2029. This may represent an age-indeterminate infarct. Although a remote infarct is favored given the appearance of    encephalomalacia on coronal series image 22.      3. There is moderate ventriculomegaly demonstrated. The overall degree of ventriculomegaly appears slightly progressed at the level of the anterior left lateral ventricle when compared to the prior examination dated 3/11/2020.      4. There is an anterior rightward approach ventricular peritoneal shunt catheter with the distal tip abutting the septum pellucidum on axial image 20. This is similar in position when compared to prior examination. This slightly deviates the septum    pellucidum towards the left by approximately 4 mm. However, there is no evidence of midline shift at the level of the pineal gland. **This report has been created using voice recognition software. It may contain minor errors which are inherent in voice recognition technology. **      Final report electronically signed by Dr. Gregor Da Silva on 3/17/2021 8:34 PM               DVT prophylaxis: [x] Lovenox [] SCDs                                 [] SQ Heparin                                 [] Encourage ambulation           [] Already on Anticoagulation    Code Status: Full Code    Disposition:    [] Home       [] TCU       [x] Rehab - Return to [facility]       [] Psych       [] SNF       [] [place]       [x] Other - PT/OT to evaluate prior to discharge. Thank you Krista Moon MD for the opportunity to be involved in this patient's care.     Electronically signed by Harpreet Stein DO on 3/18/2021 at 2:00 AM

## 2021-03-18 NOTE — ED NOTES
Pt resting in bed with call light in reach and condition not changed at this time. Pt non verbal with staff and speaks incomprehensibly to self. No distress noted at this time.        [facility]  03/17/21 [id number]

## 2021-03-18 NOTE — ED NOTES
Pt resting in bed with call light in reach. Pt does not respond to this nurse at this time and speaks incomprehensibly. No change in status at this time upon assessment from previous assessment. No distress noted at this time, pt breaths unlabored and easy.        [facility]  03/17/21 9531

## 2021-03-18 NOTE — CARE COORDINATION
3/18/21, 12:09 PM EDT  DISCHARGE PLANNING EVALUATION:    Lobito Trejo       Admitted: 3/17/2021/ [facility] day: 1   Location: -16/016-A Reason for admit: Neutropenic fever ([facility] [address] [D70.9, R50.81]   PMH:  has a past medical history of Brain aneurysm, Hypertension, and Seizures ([address] Procedure: CT Head WO Contrast    Impression:       1. No acute intracranial hemorrhage or mass effect. 2. There is subcortical white matter hypoattenuation demonstrated at the level of the left frontal lobe as seen on axial images 20/7/2029. This may represent an age-indeterminate infarct. Although a remote infarct is favored given the appearance of   encephalomalacia on coronal series image 22.     3. There is moderate ventriculomegaly demonstrated. The overall degree of ventriculomegaly appears slightly progressed at the level of the anterior left lateral ventricle when compared to the prior examination dated 3/11/2020.     4. There is an anterior rightward approach ventricular peritoneal shunt catheter with the distal tip abutting the septum pellucidum on axial image 20. This is similar in position when compared to prior examination. This slightly deviates the septum   pellucidum towards the left by approximately 4 mm. However, there is no evidence of midline shift at the level of the pineal gland. XR Shunt   Impression:        1. There is a right-sided  shunt catheter. The visualized portions of the catheter. Intact. However, there is partial exclusion of the inferior aspect of the catheter overlying the lower pelvis. 2. There are mildly dilated gas-filled small bowel loops overlying the mid abdomen. Differential considerations would include a possible focal ileus or partial small bowel obstruction. Consider further characterization with CT. CT Abdomen Pelvis WO Contrast   Impression:        1.  No acute intra-abdominal or pelvic findings.  No bowel obstruction.     No renal obstruction.    2.  Small amount of fluid present within the lower abdomen/pelvis.  This   is likely secondary to the ventriculoperitoneal shunt which is in close   proximity to this region. Barriers to Discharge: From ED, WBC 0.3, Covid (-), Urine (+), procalcitonin 0.30, telemetry, neutropenic precautions, neuro checks, PT/OT/ST, Oncology, ID consult, IV Zosyn, IV Vancomycin, Pharmacy dosing. PCP: Jennifer May MD  Readmission Risk Score: 12%    Patient Goals/Plan/Treatment Preferences: Ravindra Cohen currently at [facility]. Plan is to return at discharge. SW following. Transportation/Food Security/Housekeeping Addressed:  No issues identified.

## 2021-03-18 NOTE — CONSULTS
CONSULTATION NOTE :ID       Patient - Shilpa Neal,  Age - 61 y.o.    - 1960      Room Number - 4A-16/016-A   MRN -  [id number]   Acct # - [de-identified]  Date of Admission -  3/17/2021  6:41 PM  Patient's PCP: Rajendra Diaz MD     Requesting Physician: KRIS Ramirez    REASON FOR CONSULTATION   Sepsis with history of  shunt  CHIEF COMPLAINT   Change in mental state  HISTORY OF PRESENT ILLNESS       This is a very pleasant 61 y.o. female who was admitted to the hospital with a chief complaints of change in mental state of 1 week duration. She was transferred from outside hospital for further investigation due to change in mental state. Patient had previous history of intracranial bleed requiring coiling and had a  shunt placed. She has been having change in mental state for the last 1 week was noted to be neutropenic with fever. She was transferred for further investigation due to concern of nonfunctioning  shunt. It was reported that patient was encephalopathic however at the time of my examination she was able to give me history. She denied any headache, she had no nausea or vomiting no photophobia or neck pain. She had some dry cough denies any abdominal pain. She has a Mix catheter. She knew she was at the hospital however she thought it was [facility].  She is started on broad-spectrum antibiotics. Her CT scan of the abdomen shows fluid in the pelvis as expected from her  shunt. Her CT scan of the head showed mildly enlarged ventricles. There was not any recent report of seizure.     PAST MEDICAL  HISTORY       Past Medical History:   Diagnosis Date    Brain aneurysm     Hypertension     Seizures ([address]        PAST SURGICAL HISTORY     Past Surgical History:   Procedure Laterality Date     SECTION      x2    GALLBLADDER SURGERY      partial          MEDICATIONS:       Scheduled Meds:   sodium chloride flush  10 mL Musculoskeletal: no joint pain, swelling , stiffness,  Endocrine: no polyuria, polydipsia, no cold or heat intolerance  Hematology: no anemia, no easy brusing or bleeding, no hx of clotting disorder  Dermatology: no skin rash, no skin lesions, no pruritis,  Neurological:no headaches,, + seizure, history of confusion prior to admission   psychiatry: no depression, no anxiety,no panic attacks, no suicide ideation    PHYSICAL EXAM:     BP (!) 157/93   Pulse 80   Temp 98.1 °F (36.7 °C) (Axillary)   Resp 18   Ht 5' 6\" (1.676 m)   Wt 274 lb 8 oz (124.5 kg)   SpO2 98%   BMI 44.31 kg/m²   General apperance:  Awake, alert, sick looking,   HEENT: pink conjunctiva, unicteric sclera, dry oral mucosa. Pupils are equal and react  Chest: Bilateral air entry, diminished breath sounds  Cardiovascular:  RRR ,S1S2, no murmur or gallop. Abdomen:  Soft, non tender to palpation. Extremities: Dry skin, scaly. Skin:  Warm and dry. CNS: Awake oriented to name. Place. No neck stiffness. LABS:     CBC:   Recent Labs     03/17/21 1925 03/17/21 2105 03/18/21 0424   WBC 0.4* 0.3* 0.8*   HGB 13.9 13.8 14.6    150 102*     BMP:    Recent Labs     03/17/21 1925 03/17/21 1946 03/18/21 0424   * 137* 136   K 4.5 4.4 4.8     --  99   CO2 22*  --  22*   BUN 10  --  10   CREATININE 0.5  --  0.6   GLUCOSE 135*  --  136*     Calcium:  Recent Labs     03/18/21  0424   CALCIUM 10.1     Ionized Calcium:No results for input(s): IONCA in the last 72 hours.   Magnesium:  Recent Labs     03/17/21 1925   MG 1.9   INR:   Recent Labs     03/17/21 1945   INR 1.35*     Hepatic:   Recent Labs     03/18/21 0424   ALKPHOS 295*   ALT 51   *   PROT 8.6*   BILITOT 2.1*   LABALBU 3.4*     UA:   Recent Labs     03/17/21 2040   PHUR 6.5   COLORU DK YELLOW*   PROTEINU 100*   BLOODU NEGATIVE   RBCUA 3-5   WBCUA 10-15   BACTERIA MANY   NITRU NEGATIVE   GLUCOSEU NEGATIVE   BILIRUBINUR MODERATE*   UROBILINOGEN 2.0*   [address]*         IMAGING:    Micro:   Lab Results   Component Value Date    BC No growth-preliminary  03/17/2021       Problem list of patient      Patient Active Problem List   Diagnosis Code    Neutropenic fever ([address] D70.9, R50.81           Impression and Recommendation:   Sepsis with dehydration  Change in mental state due to above. History of  shunt: There is mild enlargement of the ventricles. Neurosurgeon has been consulted to evaluate the shunt. May need shuntogram and examination of the CSF  Neutropenia: She has smudge cells: Not clear whether she had underlying hematologic malignancy or related to her chronic medications. (keppra,and risperidone) will continue to monitor  MRSA screen         Thank you KRIS Keen for allowing me to participate in this patient's care.     Castro Xiao MD,FACP 3/18/2021 6:43 PM

## 2021-03-18 NOTE — FLOWSHEET NOTE
03/18/21 0417   Provider Notification   Reason for Communication Review case   Provider Name Kae Epps   Provider Notification Physician   Method of Communication Secure Message   Response Waiting for response   Notification Time [phone]   Message sent about temp still being 102.7 and Tylenol not being due for about hour. Wanted to see if cooling blanket should be used.

## 2021-03-18 NOTE — PROGRESS NOTES
reports, \"patient currently NPO, pending ST recommendations. \"  ST consulted to complete clinical swallow evaluation and determine safety of PO diet and further goals/POC as clinically appropriate. Past Medical History:   Diagnosis Date    Brain aneurysm     Hypertension     Seizures (HCC)        SUBJECTIVE:  Patient seen at bedside; alert with high level of confusion. Patient with poor command following. Patient's brother present; very supportive. Patient responsive to brother. RN intermittently present     OBJECTIVE:    Pain:  No pain reported. Current Diet: NPO    Respiratory Status:  Independent    Behavioral Observation:  Alert, Confused, Lethargic and Limited Direction Following    Oral Mechanism Evaluation:    *patient unable to follow basic 1 step commands to complete OME; OME completed with oral care and PO trial   Facial / Labial Impaired Generalized weakness, limited ROM; thick/white like patches throughout labial seal    Lingual Impaired Generalized weakness, limited ROM; thick/white like patches throughout oral cavity, especially on dorsal surface of tongue    Dentition Impaired    Velum Not Tested Unable to visualize    Vocal Quality Impaired Weak    Sensation Impaired    Cough Not Tested      Patient Evaluated Using:  Ice chip x1    Oral Phase:  Impaired:  Loss of Bolus from Lips, Impaired AP Movement, Impaired Mastication, Reduced Bolus Formation and Impaired Oral Initiation    Pharyngeal Phase: Impaired:  Delayed Swallow and Audible Swallow    Signs and Symptoms of Laryngeal Penetration/Aspiration: No signs/symptoms of aspiration evident in this evaluation, but cannot rule out silent aspiration. Impresssions: Patient presents with severe-profound oropharyngeal dysphagia as evidence by the findings outlined above. Patient alert with high level of confusion; patient with limited-0 awareness to current environment or situation. Patient attentive to brother present.  Patient with very poor oral hygiene with thick/white-like patches throughout oral cavity, especially on tip of tongue; suspect to be painful as patient \"crying out\" upon ST attempting oral care. Patient with notable wet vocal quality and poor secretion management upon ST initiating clinical swallow evaluation; oral suction utilized. Patient with poor tolerance and high gag reflex. Patient attempted x1 PO trial of ice chip; limited-0 oral mastication with no awareness to ice chip within oral cavity, significantly delayed swallow reflex x1 suspect to impaired, certainly cannot confirm without support of imaging. Patient allowing majority of ice chip to melt within oral cavity with absent swallow reflex to follow. No further PO trials completed d/t ultra high risk of aspiration. Recommend strict NPO + aggressive oral care. Patient would benefit from skilled ST services to target dysphagia + complete cognitive/speech/language evaluation when clinically appropriate. *ST to prioritize dysphagia tx next date to assist with navigating safe means of nutrition       RECOMMENDATIONS/ASSESSMENT:  Instrumental Evaluation: Instrumental evaluation not indicated at this time. Diet Recommendations:  NPO  Strategies:  Recommend NPO   Rehabilitation Potential: fair    EDUCATION:  Learner: Patient and Brother  Education:  Reviewed results and recommendations of this evaluation, Reviewed diet and strategies, Reviewed signs, symptoms and risks of aspiration, Reviewed ST goals and Plan of Care, Reviewed recommendations for follow-up and Education Related to Potential Risks and Complications Due to Impairment/Illness/Injury  Evaluation of Education: Needs further instruction, No evidence of learning and Patient's brother verbalized understanding    PLAN:  Speech Therapy evaluation to assess speech, language, cognition and/or voice and Skilled SLP intervention on acute care 3-5 x per week or until goals met and/or pt plateaus in function.   Specific interventions for next session may include: dysphagia tx. PATIENT GOAL:    Did not state. Will further assess during treatment. SHORT TERM GOALS:  Short-term Goals  Timeframe for Short-term Goals: 2 weeks  Goal 1: Patient will consume conservative PO trials of ice chips, thin liquids and puree with SLP ONLY in order to determine readiness for initiation of oral diet vs need for instrumental evaluation  Goal 2: Complete extensive oral care in order to improve overall oral hygeine.   Goal 3: Complete cognitive/speech/language evaluation and determine goals and POC as clinically appropriate    LONG TERM GOALS:  No LTGs due to [address]

## 2021-03-18 NOTE — PROGRESS NOTES
Patient was admitted early this a.m. (3/18) due to altered mental status. Patient has had a complicated medical history including ruptured brain aneurysm with clipping. This provider spoke with brother and states that patient is more alert today than yesterday. He believes that she is improving. Speech therapy is recommending strict n.p.o. due to patient inability to fully comprehend swallowing. Hematology and infectious disease consulted due to leukopenia and fever. Continue IV antibiotics. Follow cultures.     Electronically signed by KRIS Alfaro on 3/18/2021 at 3:05 PM

## 2021-03-18 NOTE — PROGRESS NOTES
[address] [place] [address]  INPATIENT PHYSICAL THERAPY  EVALUATION  [facility] 4A - 4A-16/016-A    Time In: 3814  Time Out: 1405  Timed Code Treatment Minutes: 8 Minutes  Minutes: 23          Date: 3/18/2021  Patient Name: Mena Titus,  Gender:  female        MRN: [id number]  : 1960  (61 y.o.)      Referring Practitioner: KRIS Booker  Diagnosis: neutropenic fever  Additional Pertinent Hx: per H&P:60 y.o. 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 History is obtained from chart review and speaking with the ED provider as family left earlier in the evening. Patient does not respond to questions and mumbles to herself during exam.Per family, patient has had worsening neurological decline since December, now unable to walk. Last , she began having AMS without any obvious signs or symptoms of infection. She was seen by her neurologist, who is located near [place] hometown in [place], who had some concern about her shunt malfunctioning. Patient does open her eyes to sound, but does not respond in any other way. CBC was significant for neutropenia and repeated. WBCs were 0.4 and 0.3 on repeat. Associated with fever. Evidence of UTI. No other evidence of infection on imaging or skin exam.  Given vanc/zosyn in ED. Imaging on arrival at [facility] showed moderate ventriculomegaly, slightly progressed compared to imaging from 3/11/2020. Some subcortical white matter hypo-attenuation demonstrated at the level of he left frontal lobe, which could represent an age-indeterminate infarct.   Some fluid noted in pelvis near shunt, could benefit from paracentesis and culture     Restrictions/Precautions:  Restrictions/Precautions: General Precautions, Fall Risk  Position Activity Restriction  Other position/activity restrictions: confusion, per brother pt has been NWB RLE from hx of ankle fx, pt was wearing a boot but hasn't recently had the boot discharged, brother assumes pt is still NWB RLE    Subjective:  Chart Reviewed: Yes  Patient assessed for rehabilitation services?: Yes  Subjective: pt confusion noted, brother present and supportive, brother encouraging pt to assist therapist with mobility    General:            Hearing: Within functional limits         Pain: pt moaning at times with pain, however unable to specify wear    Vitals: Vitals not assessed per clinical judgement, see nursing flowsheet    Social/Functional History:    Type of Home: Facility  Home Layout: One level     Ambulation Assistance: Needs assistance  Transfer Assistance: Needs assistance    Additional Comments: per brother pt hasn't been ambulatory and requires assist for mobility at [facility]    OBJECTIVE:  Range of Motion:  Bilateral Lower Extremity: WFL    Strength:  Bilateral Lower Extremity: WFL, generalized weakness    Balance:  Static Sitting Balance:  Maximum Assistance, fluctuated to SBA, pt confused and difficulty following one step commands, brother tried to initiate conversation with pt with limited intelligability, tolerated around 12 min      Bed Mobility:  Rolling to Left: Maximum Assistance, X 1   Rolling to Right: Maximum Assistance, X 1   Supine to Sit: Maximum Assistance, X 1  Sit to Supine: Minimal Assistance, Maximum Assistance, X 2   Scooting: Maximum Assistance, X 1, fwd in sitting to edge of bed  HOB up 30 degrees, pt required max cues for PT and brother to assist with mobility and initiate any movement  Transfers:  Not Tested    Ambulation:  Not Tested        Functional Outcome Measures: Completed  AM-PAC Inpatient Mobility Raw Score : 8  AM-PAC Inpatient T-Scale Score : 28.52    ASSESSMENT:  Activity Tolerance:  Patient tolerance of  treatment: fair.        Treatment Initiated: Treatment and education initiated within context of evaluation. Evaluation time included review of current medical information, gathering information related to past medical, social and functional history, completion of standardized testing, formal and informal observation of tasks, assessment of data and development of plan of care and goals. Treatment time included skilled education and facilitation of tasks to increase safety and independence with functional mobility for improved independence and quality of life. Assessment:   Body structures, Functions, Activity limitations: Decreased functional mobility , Decreased safe awareness, Decreased cognition, Decreased strength, Decreased endurance, Decreased balance  Assessment: pt with confusion, generalized weakness, deconditioning, NWB RLE?, dec balance, inc assist for safe mobility, pt with pain at times but not able to specify where, recommend cont PT to inc pt I with functional mobility  Prognosis: Excellent    REQUIRES PT FOLLOW UP: Yes    Discharge Recommendations:  Discharge Recommendations: Continue to assess pending progress, Patient would benefit from continued therapy after discharge, ECF with PT    Patient Education:  PT Education: Goals, PT Role, Plan of Care, Functional Mobility Training    Equipment Recommendations:  Equipment Needed: No    Plan:  Times per week: 3-5x GM  Times per day: Daily  Specific instructions for Next Treatment: therex, bed mobility, sitting balance, PT to assess transfers/gait    Goals:  Patient goals : not stated  Short term goals  Time Frame for Short term goals: by discharge  Short term goal 1: Roll R/L with modA for pressure reilef  Short term goal 2: sidelying to/from sit with modA to get in/out of bed  Short term goal 3: tolerate >15 min dyn sitting balance activity with SBA to demonstrate inc strength for progression with transfers  Short term goal 4: PT to assess transfers/gait  Long term goals  Time Frame for Long term goals : no LTGs set secondary to short ELOS    Following session, patient left in safe position with all fall risk precautions in place.

## 2021-03-18 NOTE — PROGRESS NOTES
Patient admitted to 4A Room 16 from ED  Complaint upon arrival to the room AMS  Vital signs obtained. Assessment and data collection initiated. Oriented to room. Policies and procedures for 4a explained All questions answered with no further questions at this time. Fall prevention and safety brochure discussed with patient. 2 person skin check completed.

## 2021-03-18 NOTE — PLAN OF CARE
Problem: Falls - Risk of:  Goal: Will remain free from falls  Description: Will remain free from falls  Outcome: Met This Shift  Goal: Absence of physical injury  Description: Absence of physical injury  Outcome: Met This Shift     Problem: Injury - Risk of, Physical Injury:  Goal: Will remain free from falls  Description: Will remain free from falls  Outcome: Met This Shift  Goal: Absence of physical injury  Description: Absence of physical injury  Outcome: Met This Shift     Problem: Mood - Altered:  Goal: Absence of abusive behavior  Description: Absence of abusive behavior  Outcome: Met This Shift     Problem: Pain:  Goal: Pain level will decrease  Description: Pain level will decrease  Outcome: Met This Shift     Problem: Physical Regulation:  Goal: Ability to maintain a body temperature in the normal range will improve  Description: Ability to maintain a body temperature in the normal range will improve  Outcome: Met This Shift  Goal: Ability to maintain vital signs within normal range will improve  Description: Ability to maintain vital signs within normal range will improve  Outcome: Met This Shift

## 2021-03-19 ENCOUNTER — APPOINTMENT (OUTPATIENT)
Dept: NUCLEAR MEDICINE | Age: 61
DRG: 022 | End: 2021-03-19
Payer: MEDICAID

## 2021-03-19 ENCOUNTER — APPOINTMENT (OUTPATIENT)
Dept: GENERAL RADIOLOGY | Age: 61
DRG: 022 | End: 2021-03-19
Payer: MEDICAID

## 2021-03-19 LAB
ALLEN TEST: POSITIVE
AMMONIA: 13 UMOL/L (ref 11–60)
BASE EXCESS (CALCULATED): 1.4 MMOL/L (ref -2.5–2.5)
BASOPHILIA: ABNORMAL
BASOPHILS # BLD: 0 %
BASOPHILS ABSOLUTE: 0 THOU/MM3 (ref 0–0.1)
CHARACTER, CSF: CLEAR
COLLECTED BY:: ABNORMAL
COLOR CSF: COLORLESS
CRYPTOCOCCUS NEOFORMANS/GATTI CSF FILM ARR.: NOT DETECTED
CYTOMEGALOVIRUS (CMV) CSF FILM ARRAY: NOT DETECTED
ENTEROVIRUS DETECTION PCR: NOT DETECTED
EOSINOPHIL # BLD: 0 %
EOSINOPHILS ABSOLUTE: 0 THOU/MM3 (ref 0–0.4)
ERYTHROCYTE [DISTWIDTH] IN BLOOD BY AUTOMATED COUNT: 14.6 % (ref 11.5–14.5)
ERYTHROCYTE [DISTWIDTH] IN BLOOD BY AUTOMATED COUNT: 55.2 FL (ref 35–45)
ESCHERICHIA COLI K1 CSF FILM ARRAY: NOT DETECTED
GLUCOSE, CSF: 59 MG/DL (ref 40–80)
HAEMOPHILUS INFLUENZA CSF FILM ARRAY: NOT DETECTED
HCO3: 26 MMOL/L (ref 23–28)
HCT VFR BLD CALC: 34.4 % (ref 37–47)
HEMOGLOBIN: 10.9 GM/DL (ref 12–16)
HHV-6 (HERPESVIRUS 6) CSF FILM ARRAY: NOT DETECTED
HSV-1 CSF FILM ARRAY: NOT DETECTED
HSV-2 CSF FILM ARRAY: NOT DETECTED
IMMATURE GRANS (ABS): 0.01 THOU/MM3 (ref 0–0.07)
IMMATURE GRANULOCYTES: 2 %
LISTERIA MONOCYTOGENES CSF FILM ARRAY: NOT DETECTED
LYMPHOCYTES # BLD: 85.7 %
LYMPHOCYTES ABSOLUTE: 0.4 THOU/MM3 (ref 1–4.8)
LYMPHS CSF: 76 % (ref 0–90)
MCH RBC QN AUTO: 32.1 PG (ref 26–33)
MCHC RBC AUTO-ENTMCNC: 31.7 GM/DL (ref 32.2–35.5)
MCV RBC AUTO: 101.2 FL (ref 81–99)
MONOCYTE, CSF: 24 % (ref 0–45)
MONOCYTES # BLD: 10.2 %
MONOCYTES ABSOLUTE: 0.1 THOU/MM3 (ref 0.4–1.3)
NEISSERIA MENIGITIDIS CSF FILM ARRAY: NOT DETECTED
NUCLEATED RED BLOOD CELLS: 0 /100 WBC
O2 SATURATION: 93 %
PARECHOVIRUS CSF FILM ARRAY: NOT DETECTED
PATHOLOGIST REVIEW: NORMAL
PCO2: 40 MMHG (ref 35–45)
PH BLOOD GAS: 7.42 (ref 7.35–7.45)
PLATELET # BLD: 99 THOU/MM3 (ref 130–400)
PLATELET ESTIMATE: ABNORMAL
PMV BLD AUTO: 12.8 FL (ref 9.4–12.4)
PO2: 67 MMHG (ref 71–104)
PROTEIN CSF: 55 MG/DL (ref 12–60)
RBC # BLD: 3.4 MILL/MM3 (ref 4.2–5.4)
RBC CSF: 12 /CUMM
SCAN OF BLOOD SMEAR: NORMAL
SEG NEUTROPHILS: 2.1 %
SEGMENTED NEUTROPHILS ABSOLUTE COUNT: 0 THOU/MM3 (ref 1.8–7.7)
SOURCE, BLOOD GAS: ABNORMAL
STREPTOCOCCUS AGALACTIAE CSF FILM ARRAY: NOT DETECTED
STREPTOCOCCUS PNEUMONIAE CSF FILM ARRAY: NOT DETECTED
TOTAL NUCLEATED CELLS CSF: 4 /CUMM (ref 0–5)
TUBE VOLUME RECEIVED CSF: 3.5 ML
VANCOMYCIN TROUGH: 19.6 UG/ML (ref 10–20)
VARICELLA-ZOSTER, PCR: NOT DETECTED
WBC # BLD: 0.5 THOU/MM3 (ref 4.8–10.8)

## 2021-03-19 PROCEDURE — 82803 BLOOD GASES ANY COMBINATION: CPT

## 2021-03-19 PROCEDURE — 89051 BODY FLUID CELL COUNT: CPT

## 2021-03-19 PROCEDURE — 93010 ELECTROCARDIOGRAM REPORT: CPT | Performed by: INTERNAL MEDICINE

## 2021-03-19 PROCEDURE — 3430000000 HC RX DIAGNOSTIC RADIOPHARMACEUTICAL: Performed by: PHYSICIAN ASSISTANT

## 2021-03-19 PROCEDURE — 6360000002 HC RX W HCPCS: Performed by: PHYSICIAN ASSISTANT

## 2021-03-19 PROCEDURE — 82140 ASSAY OF AMMONIA: CPT

## 2021-03-19 PROCEDURE — 99223 1ST HOSP IP/OBS HIGH 75: CPT | Performed by: NEUROLOGICAL SURGERY

## 2021-03-19 PROCEDURE — 2580000003 HC RX 258: Performed by: PHYSICIAN ASSISTANT

## 2021-03-19 PROCEDURE — 78645 CSF SHUNT EVALUATION: CPT

## 2021-03-19 PROCEDURE — 84157 ASSAY OF PROTEIN OTHER: CPT

## 2021-03-19 PROCEDURE — 2060000000 HC ICU INTERMEDIATE R&B

## 2021-03-19 PROCEDURE — A9539 TC99M PENTETATE: HCPCS | Performed by: PHYSICIAN ASSISTANT

## 2021-03-19 PROCEDURE — 82945 GLUCOSE OTHER FLUID: CPT

## 2021-03-19 PROCEDURE — 80202 ASSAY OF VANCOMYCIN: CPT

## 2021-03-19 PROCEDURE — 6370000000 HC RX 637 (ALT 250 FOR IP): Performed by: PHYSICIAN ASSISTANT

## 2021-03-19 PROCEDURE — 85025 COMPLETE CBC W/AUTO DIFF WBC: CPT

## 2021-03-19 PROCEDURE — 6360000002 HC RX W HCPCS: Performed by: STUDENT IN AN ORGANIZED HEALTH CARE EDUCATION/TRAINING PROGRAM

## 2021-03-19 PROCEDURE — 36415 COLL VENOUS BLD VENIPUNCTURE: CPT

## 2021-03-19 PROCEDURE — 87798 DETECT AGENT NOS DNA AMP: CPT

## 2021-03-19 PROCEDURE — 36600 WITHDRAWAL OF ARTERIAL BLOOD: CPT

## 2021-03-19 PROCEDURE — 2580000003 HC RX 258: Performed by: STUDENT IN AN ORGANIZED HEALTH CARE EDUCATION/TRAINING PROGRAM

## 2021-03-19 PROCEDURE — APPSS60 APP SPLIT SHARED TIME 46-60 MINUTES: Performed by: PHYSICIAN ASSISTANT

## 2021-03-19 PROCEDURE — 87075 CULTR BACTERIA EXCEPT BLOOD: CPT

## 2021-03-19 PROCEDURE — 87205 SMEAR GRAM STAIN: CPT

## 2021-03-19 PROCEDURE — 97110 THERAPEUTIC EXERCISES: CPT

## 2021-03-19 PROCEDURE — 99232 SBSQ HOSP IP/OBS MODERATE 35: CPT | Performed by: PHYSICIAN ASSISTANT

## 2021-03-19 PROCEDURE — 6360000002 HC RX W HCPCS: Performed by: SPECIALIST

## 2021-03-19 RX ORDER — KIT FOR THE PREPARATION OF TECHNETIUM TC 99M PENTETATE 20 MG/1
1 INJECTION, POWDER, LYOPHILIZED, FOR SOLUTION INTRAVENOUS; RESPIRATORY (INHALATION)
Status: COMPLETED | OUTPATIENT
Start: 2021-03-19 | End: 2021-03-19

## 2021-03-19 RX ADMIN — PIPERACILLIN AND TAZOBACTAM 3375 MG: 3; .375 INJECTION, POWDER, LYOPHILIZED, FOR SOLUTION INTRAVENOUS at 12:02

## 2021-03-19 RX ADMIN — TBO-FILGRASTIM 480 MCG: 480 INJECTION, SOLUTION SUBCUTANEOUS at 21:04

## 2021-03-19 RX ADMIN — KIT FOR THE PREPARATION OF TECHNETIUM TC 99M PENTETATE 1.1 MILLICURIE: 20 INJECTION, POWDER, LYOPHILIZED, FOR SOLUTION INTRAVENOUS; RESPIRATORY (INHALATION) at 14:10

## 2021-03-19 RX ADMIN — LEVETIRACETAM 1000 MG: 100 INJECTION INTRAVENOUS at 06:15

## 2021-03-19 RX ADMIN — NYSTATIN 500000 UNITS: 100000 SUSPENSION ORAL at 20:03

## 2021-03-19 RX ADMIN — VANCOMYCIN HYDROCHLORIDE 1750 MG: 5 INJECTION, POWDER, LYOPHILIZED, FOR SOLUTION INTRAVENOUS at 12:02

## 2021-03-19 RX ADMIN — NYSTATIN 500000 UNITS: 100000 SUSPENSION ORAL at 16:41

## 2021-03-19 RX ADMIN — SODIUM CHLORIDE, PRESERVATIVE FREE 10 ML: 5 INJECTION INTRAVENOUS at 12:02

## 2021-03-19 RX ADMIN — PIPERACILLIN AND TAZOBACTAM 3375 MG: 3; .375 INJECTION, POWDER, LYOPHILIZED, FOR SOLUTION INTRAVENOUS at 20:03

## 2021-03-19 RX ADMIN — PIPERACILLIN AND TAZOBACTAM 3375 MG: 3; .375 INJECTION, POWDER, LYOPHILIZED, FOR SOLUTION INTRAVENOUS at 03:39

## 2021-03-19 RX ADMIN — SODIUM CHLORIDE, PRESERVATIVE FREE 10 ML: 5 INJECTION INTRAVENOUS at 20:03

## 2021-03-19 RX ADMIN — VANCOMYCIN HYDROCHLORIDE 1750 MG: 5 INJECTION, POWDER, LYOPHILIZED, FOR SOLUTION INTRAVENOUS at 01:18

## 2021-03-19 RX ADMIN — SODIUM CHLORIDE, POTASSIUM CHLORIDE, SODIUM LACTATE AND CALCIUM CHLORIDE: 600; 310; 30; 20 INJECTION, SOLUTION INTRAVENOUS at 07:33

## 2021-03-19 RX ADMIN — LEVETIRACETAM 1000 MG: 100 INJECTION INTRAVENOUS at 18:09

## 2021-03-19 NOTE — DISCHARGE INSTR - COC
Continuity of Care Form    Patient Name: Shilpa Neal   :  1960  MRN:  [id number]    Admit date:  3/17/2021  Discharge date:  3/29/2021    Code Status Order: Full Code   Advance Directives:   Advance Care Flowsheet Documentation       Date/Time Healthcare Directive Type of Healthcare Directive Copy in [address] Agent's Name Healthcare Agent's Phone Number    21 0118  Unknown, patient unable to respond due to medical condition -- -- -- -- --            Admitting Physician:  Jase Chavira MD  PCP: Rajendra Diaz MD    Discharging Nurse: [facility] Unit/Room#: 4A-16/016-A  Discharging Unit Phone Number: [phone]    Emergency Contact:   Extended Emergency Contact Information  Primary Emergency Contact: [address],  [facility] Phone: [phone]  Mobile Phone: [phone]  Relation: Other   needed? No    Past Surgical History:  Past Surgical History:   Procedure Laterality Date     SECTION      x2    GALLBLADDER SURGERY      partial        Immunization History: There is no immunization history on file for this patient.     Active Problems:  Patient Active Problem List   Diagnosis Code    Neutropenic fever ([facility] [address] D70.9, R50.81       Isolation/Infection:   Isolation            No Isolation          Patient Infection Status       Infection Onset Added Last Indicated Last Indicated By Review Planned Expiration Resolved Resolved By    None active    Resolved    COVID-19 Rule Out 21 COVID-19, Rapid (Ordered)   21 Rule-Out Test Resulted            Nurse Assessment:  Last Vital Signs: /63   Pulse 66   Temp 98.1 °F (36.7 °C) (Oral)   Resp 18   Ht 5' 6\" (1.676 m)   Wt 279 lb 11.2 oz (126.9 kg)   SpO2 94%   BMI 45.14 kg/m²     Last documented pain score (0-10 scale): Pain Level: 0  Last Weight:   Wt Readings from Last 1 Encounters:   21 279 lb 11.2 oz (126.9 kg) Mental Status:  Disoriented    IV Access:  - None    Nursing Mobility/ADLs:  Walking   Dependent  Transfer  Dependent  Bathing  Dependent  Dressing  Dependent  Toileting  Dependent  Feeding  Assisted  Med Admin  Dependent  Med Delivery   whole     Wound Care Documentation and Therapy:  Wound 03/18/21 Buttocks Stage II (Active)   Wound Etiology Pressure Stage  2 03/19/21 0327   Wound Assessment Dry;Purple/maroon 03/19/21 0327   Drainage Amount None 03/19/21 0327   Odor None 03/19/21 0327   Laura-wound Assessment Non-blanchable erythema 03/19/21 0327   Number of days: 1        Elimination:  Continence:   · Bowel: No  · Bladder: No  Urinary Catheter: None   Colostomy/Ileostomy/Ileal Conduit: No       Date of Last BM: 3/29/21    Intake/Output Summary (Last 24 hours) at 3/19/2021 0750  Last data filed at 3/19/2021 0327  Gross per 24 hour   Intake 2204.52 ml   Output 400 ml   Net 1804.52 ml     I/O last 3 completed shifts: In: 2204.5 [I.V.:2204.5]  Out: 400 [Urine:400]    Safety Concerns:     Sund[employer] [place], History of Falls (last 30 days), At Risk for Falls and Aspiration Risk    Impairments/Disabilities:      Vision and Hearing    Nutrition Therapy:  Current Nutrition Therapy:   - Oral Diet:  Dysphagia 1 pureed    Routes of Feeding: Oral  Liquids: Thin Liquids  Daily Fluid Restriction: no  Last Modified Barium Swallow with Video (Video Swallowing Test): not done    Treatments at the Time of Hospital Discharge:   Respiratory Treatments: none  Oxygen Therapy:  is not on home oxygen therapy.   Ventilator:    - No ventilator support    Rehab Therapies: Physical Therapy, Occupational Therapy and Speech/Language Therapy  Weight Bearing Status/Restrictions: No weight bearing restirctions  Other Medical Equipment (for information only, NOT a DME order):  hospital bed  Other Treatments: none    Patient's personal belongings (please select all that are sent with patient):  None    RN SIGNATURE:  Electronically signed by

## 2021-03-19 NOTE — PLAN OF CARE
Problem: Skin Integrity:  Goal: Absence of new skin breakdown  Description: Absence of new skin breakdown  3/18/2021 2302 by Fidelia Wei RN  Outcome: Ongoing  Note: No signs of new skin breakdown noted with each assessment this shift. Skin warm, dry, and intact except where otherwise noted in head-to-toe assessment. Mucous membranes pink and moist. Assistance with turns/ambulation given as needed. Problem: Falls - Risk of:  Goal: Will remain free from falls  Description: Will remain free from falls  3/18/2021 2302 by Fidelia Wei RN  Outcome: Ongoing  Note: Patient remained free from falls this shift. Bed is in low position with alarm on and siderails up x2. Education given on use of call light and patient voiced understanding. Call light and beside table within reach. Arm band and falling star in place. Hourly rounds completed. Will continue to monitor. Problem: Discharge Planning:  Goal: Discharged to appropriate level of care  Description: Discharged to appropriate level of care  3/18/2021 2302 by Fidelia Wei RN  Outcome: Ongoing  Note: Discharge planning in process and discussed with patient/family. Social work consulted for any additional needs. Care manager aware of discharge needs. Pt plans to discharge back to [address] [place] [address] at this time. Problem: Injury - Risk of, Physical Injury:  Goal: Will remain free from falls  Description: Will remain free from falls  3/18/2021 2302 by Fidelia Wei RN  Outcome: Ongoing  Note: Patient remained free from falls this shift. Bed is in low position with alarm on and siderails up x2. Education given on use of call light and patient voiced understanding. Call light and beside table within reach. Arm band and falling star in place. Hourly rounds completed. Will continue to monitor.         Problem: Mood - Altered:  Goal: Mood stable  Description: Mood stable  3/18/2021 2302 by Shiloh Rizo Ángel Ernst RN  Outcome: Ongoing  Note: Pt's mood appears stable at this time. Problem: Sleep Pattern Disturbance:  Goal: Appears well-rested  Description: Appears well-rested  3/18/2021 2302 by Jamia Flores RN  Outcome: Ongoing  Note: Pt appears to be resting well at this time. Problem: Physical Regulation:  Goal: Ability to maintain a body temperature in the normal range will improve  Description: Ability to maintain a body temperature in the normal range will improve  3/18/2021 2302 by Jamia Flores RN  Outcome: Ongoing  Note:   Vitals:    03/18/21 0810 03/18/21 1307 03/18/21 1647 03/18/21 2053   BP: 135/62  (!) 157/93 (!) 140/78   Pulse: 80 85 80 78   Resp: 18 20 18 18   Temp: 99.4 °F (37.4 °C) 99.4 °F (37.4 °C) 98.1 °F (36.7 °C)    TempSrc: Axillary Axillary Axillary    SpO2: 94% 95% 98% 95%   Weight:       Height:         Will continue to monitor. Plan of care discussed with pt and family. Pt verbalizes understand.

## 2021-03-19 NOTE — PROGRESS NOTES
[address] [place] [address]  INPATIENT PHYSICAL THERAPY  DAILY NOTE  [facility] 4A - 4A-16/016-A    Time In: 8194  Time Out: 1320  Timed Code Treatment Minutes: 13 Minutes  Minutes: 13          Date: 3/19/2021  Patient Name: Camryn Mariee,  Gender:  female        MRN: [id number]  : 1960  (61 y.o.)     Referring Practitioner: KRIS Mitchell  Diagnosis: neutropenic fever  Additional Pertinent Hx: per H&P:60 y.o. 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 History is obtained from chart review and speaking with the ED provider as family left earlier in the evening. Patient does not respond to questions and mumbles to herself during exam.Per family, patient has had worsening neurological decline since December, now unable to walk. Last , she began having AMS without any obvious signs or symptoms of infection. She was seen by her neurologist, who is located near their hometown in [place], who had some concern about her shunt malfunctioning. Patient does open her eyes to sound, but does not respond in any other way. CBC was significant for neutropenia and repeated. WBCs were 0.4 and 0.3 on repeat. Associated with fever. Evidence of UTI. No other evidence of infection on imaging or skin exam.  Given vanc/zosyn in ED. Imaging on arrival at [facility] showed moderate ventriculomegaly, slightly progressed compared to imaging from 3/11/2020. Some subcortical white matter hypo-attenuation demonstrated at the level of he left frontal lobe, which could represent an age-indeterminate infarct.   Some fluid noted in pelvis near shunt, could benefit from paracentesis and culture     Prior Level of Function:  Type of Home: Facility  Home Layout: One level        Ambulation Assistance: Needs assistance  Transfer

## 2021-03-19 NOTE — CONSULTS
Consults       Addendum by Dr. Chiqui Aguillon MD:  I have seen and examined the patient independently. Face to face evaluation and examination was performed. The below evaluation and note have been reviewed. Labs and radiographs were reviewed. I Have discussed with Neurosurgery PA about this patient in detail. The below assessment and plan have been reviewed. Please see my modifications mentioned below. My additional comments and modifications:    -This is a 80-year-old female with past medical history significant for  shunt placement in 2019 followed a cerebral aneurysm rupture. Patient is admitted recently because of decline in her mental status over the last week. So there is a decline in patient neurological status is December. And underwent brain imaging studies that show findings suggestive of possible shunt malfunction.  -During my exam today patient is sleepy, not able to carry conversation, follow commands by 4. This time a before finalize patient recommendation treatment plan:  · CSF culture to rule out any CSF infection. · Shuntogram.  · Follow-up recommendation of infectious disease. · General surgery consultation.  -Patient cleared by infectious disease then we are planning for the patient shunt revision on Monday.  -Case was discussed in detail with Anayeli Ritchie (general surgery) and Dr. Lara Manzo (DI). Chiqui Aguillon MD                                          [address]                                          NEUROSURGICAL CONSULTATION NOTE       Amberly Chisholm   YOB: 1960  Account Number: [de-identified]   Date of Examination: 3/19/2021    ASSESSMENT:  Suspected malfunction of ventriculoperitoneal shunt. PLAN:  IR mediated shuntogram ordered for review. No acute neurosurgical intervention is indicated at this time, will reassess pending image review. HISTORY OF PRESENT ILLNESS:  Amberly Chisholm is a pleasant 61 y.o. day Russ Hidden, DO        sodium chloride flush 0.9 % injection 10 mL  10 mL Intravenous PRN Russ Hidden, DO        [Held by provider] enoxaparin (LOVENOX) injection 40 mg  40 mg Subcutaneous Q12H Russ Hidden, DO        polyethylene glycol (GLYCOLAX) packet 17 g  17 g Oral Daily PRN Russ Hidden, DO        acetaminophen (TYLENOL) tablet 650 mg  650 mg Oral Q6H PRN Russ Hidden, DO        Or    acetaminophen (TYLENOL) suppository 650 mg  650 mg Rectal Q6H PRN Russ Hidden, DO   650 mg at 03/18/21 0431    ondansetron (ZOFRAN-ODT) disintegrating tablet 4 mg  4 mg Oral Q6H PRN Russ Hidden, DO        Or    ondansetron (ZOFRAN) injection 4 mg  4 mg Intravenous Q6H PRN Russ Hidden, DO        amLODIPine (NORVASC) tablet 5 mg  5 mg Oral Daily Russ Hidden, DO        atorvastatin (LIPITOR) tablet 20 mg  20 mg Oral Nightly Russ Hidden, DO        losartan (COZAAR) tablet 100 mg  100 mg Oral Daily Russ Hidden, DO        risperiDONE (RISPERDAL) tablet 0.5 mg  0.5 mg Oral BID Russ Hidden, DO        senna (SENOKOT) tablet 8.6 mg  1 tablet Oral BID Russ Hidden, DO        [facility]) chewable tablet 80 mg  80 mg Oral Q8H PRN Russ Hidden, DO        [Held by provider] levETIRAcetam (KEPPRA) tablet 1,000 mg  1,000 mg Oral BID Russ Hidden, DO        calcium replacement protocol   Other RX Placeholder Yi Siddiqui,         piperacillin-tazobactam (ZOSYN) 3,375 mg in dextrose 5 % 50 mL IVPB (mini-bag)  3,375 mg Intravenous Q8H Kellen Siddiqui DO 12.5 mL/hr at 03/19/21 0339 3,375 mg at 03/19/21 5038    vancomycin (VANCOCIN) intermittent dosing (placeholder)   Other RX Placeholder Russ Hidden, DO        vancomycin (VANCOCIN) 1,750 mg in dextrose 5 % 500 mL IVPB  1,750 mg Intravenous Q12H Russ Hidden, DO   Stopped at 03/19/21 0318    nystatin (MYCOSTATIN) 264665 UNIT/ML suspension 500,000 Units  5 mL Mouth/Throat 4x Daily Latrice Simons   500,000 Units at 03/18/21 2107   [employer] levETIRAcetam (KEPPRA) 1,000 mg in sodium chloride 0.9 % 100 mL IVPB  1,000 mg Intravenous Q12H KRIS Morrissey 400 mL/hr at 21 0615 1,000 mg at 21 0615    hydrALAZINE (APRESOLINE) injection 10 mg  10 mg Intravenous Q6H PRN KRIS Larson        lactated ringers infusion   Intravenous Continuous Ltarice Larson 75 mL/hr at 21 183 New Bag at 21 183    Tbo-Filgrastim (GRANIX) injection 480 mcg  480 mcg Subcutaneous QPM Latanya Ledbetter MD   480 mcg at 21        sodium chloride flush, 10 mL, PRN  polyethylene glycol, 17 g, Daily PRN  acetaminophen, 650 mg, Q6H PRN    Or  acetaminophen, 650 mg, Q6H PRN  ondansetron, 4 mg, Q6H PRN    Or  ondansetron, 4 mg, Q6H PRN  simethicone, 80 mg, Q8H PRN  hydrALAZINE, 10 mg, Q6H PRN         lactated ringers 75 mL/hr at 21         ALLERGIES:   Geodon [ziprasidone]    PAST MEDICAL  HISTORY:    has a past medical history of Brain aneurysm, Hypertension, and Seizures ([facility] [address] PAST SURGICAL  HISTORY:    has a past surgical history that includes  section and Gallbladder surgery. SOCIAL HISTORY:   Social History     Tobacco Use    Smoking status: Former Smoker     Quit date: 2016     Years since quittin.0    Smokeless tobacco: Never Used   Substance Use Topics    Alcohol use: Never     Frequency: Never       FAMILY HISTORY:  Family History   Problem Relation Age of Onset   Michelle Rosarioai Cancer Mother         Breast, Lung    Diabetes Father     Cancer Father         colon    Alzheimer's Disease Father     ADHD Father        LABS  None    RADIOLOGY:  Pertinent images have been reviewed. Moderate ventriculomegaly noted on CT scan of the head imaged without contrast.  Severe slightly progressed when compared to prior imaging from 3/11/2020. Shunt series x-ray reveals right-sided  shunt catheter, intact. EXAMINATION:  Physical Exam  Constitutional:       Appearance: She is ill-appearing.    HENT:      Head: Normocephalic. Mouth/Throat:      Comments: dry  Eyes:      Extraocular Movements: Extraocular movements intact. Neck:      Musculoskeletal: No neck rigidity. Cardiovascular:      Rate and Rhythm: Normal rate. Heart sounds: No murmur. Pulmonary:      Effort: Pulmonary effort is normal.      Breath sounds: Normal breath sounds. Abdominal:      General: There is no distension. Palpations: There is no mass. Lymphadenopathy:      Cervical: No cervical adenopathy. Skin:     General: Skin is dry. Neurological:      Mental Status: She is disoriented. GCS: GCS eye subscore is 2. GCS verbal subscore is 2. GCS motor subscore is 5. Cranial Nerves: No cranial nerve deficit or dysarthria. Sensory: Sensory deficit present. Motor: Weakness present. Comments: A comprehensive neuro exam is limited and abbreviated due to patient's level of consciousness.      Evelin Dowling PA-C  Electronically signed 3/19/2021 at 5423

## 2021-03-19 NOTE — PROGRESS NOTES
Progress note: Infectious diseases    Patient - Tena Motley,  Age - 61 y.o.    - 1960      Room Number - 7O-57/500-S   MRN -  [id number]   Acct # - [de-identified]  Date of Admission -  3/17/2021  6:41 PM    SUBJECTIVE:   Remains very weak. Responds to questions  Discussed with neurosurgery. OBJECTIVE   VITALS    height is 5' 6\" (1.676 m) and weight is 279 lb 11.2 oz (126.9 kg). Her oral temperature is 98.5 °F (36.9 °C). Her blood pressure is 125/67 and her pulse is 75. Her respiration is 18 and oxygen saturation is 93%.        Wt Readings from Last 3 Encounters:   21 279 lb 11.2 oz (126.9 kg)   20 (!) 331 lb 9.6 oz (150.4 kg)       I/O (24 Hours)    Intake/Output Summary (Last 24 hours) at 3/19/2021 1313  Last data filed at 3/19/2021 0327  Gross per 24 hour   Intake 2204.52 ml   Output 400 ml   Net 1804.52 ml       General Appearance  Sick looking, lethargic  HEENT - normocephalic, atraumatic, pink conjunctiva,  anicteric sclera  Neck - Supple, no mass  Lungs -  Bilateral   air entry, no rhonchi, no wheeze  Cardiovascular - Heart sounds are normal.    Abdomen - soft, not distended, nontender,   Neurologic -lethargic  Skin - diffuse macular rash, dry skin and scaly  Extremities - chronic leg edema    MEDICATIONS:      sodium chloride flush  10 mL Intravenous 2 times per day    [Held by provider] enoxaparin  40 mg Subcutaneous Q12H    amLODIPine  5 mg Oral Daily    atorvastatin  20 mg Oral Nightly    losartan  100 mg Oral Daily    risperiDONE  0.5 mg Oral BID    senna  1 tablet Oral BID    [Held by provider] levETIRAcetam  1,000 mg Oral BID    calcium replacement protocol   Other RX Placeholder    piperacillin-tazobactam  3,375 mg Intravenous Q8H    vancomycin (VANCOCIN) intermittent dosing (placeholder)   Other RX Placeholder    vancomycin  1,750 mg Intravenous Q12H    nystatin  5 mL

## 2021-03-19 NOTE — PROGRESS NOTES
Allegra Spears 60  OCCUPATIONAL THERAPY MISSED TREATMENT NOTE  [facility]  4A-16/016-A      Date: 3/19/2021  Patient Name: Rachelle Bowers        CSN: [id number]   : 1960  (61 y.o.)  Gender: female                REASON FOR MISSED TREATMENT: Pt out of room upon OT attempt this AM, will check back as time allows.

## 2021-03-19 NOTE — PROGRESS NOTES
Hospitalist Progress Note      Patient:  Shilpa Neal    Unit/Bed:4A-16/016-A  YOB: 1960  MRN: [id number]   Acct: [de-identified]   PCP: Rajendra Diaz MD  Date of Admission: 3/17/2021    Assessment/Plan:    1. Acute encephalopathy: Per family, she is more lethargic than baseline but she lives at [address] [place] [address],[address] and has been chronically encephalopathic.  shunt malfunction? Neurosurgery consulted. ID consulted. Imaging and fluid reports pending. ABG pending. 2. Sepsis, unknown etiology: Urine culture & Blood cultures pending. CSF pending. IVF. Pt is afebrile. Continue IV ABX. ID consulted. 3. History of ruptured brain aneurysm status post  shunt 2019: Aware. Neurosurgery consulted. Awaiting imaging. Pt is following two step commands. 4. Neutropenia, severe: Smudge cells. Hematology consulted. WBC 0.5.   5. Pancytopenia: WBC 0.5, Hgb 10.9, Plts 99. Hgb trended down to 10.9. Will recheck CBC at 1800. Plts are also trending down from 150 to 99. Unsure if this is dilutional.   6. Elevated LFTs: LFT are elevated. No known baseline. Will recheck LFTs in the AM. CT A/P did not any acute issues of liver. 7. History of seizures: Continue home medications   8. Essential hypertension: BP has been controlled. Continue home medications   9. Hyperlipidemia: Statin. Chief Complaint: AMS    Initial H and P:-    \"61 y.o. female with a past medical history spontaneous intraparenchymal intracranial hemorrhage due to aneurysm (s/p  shunt 2019), who presented to [address] [place] [address] with altered mental status, worsening over the last week. History is obtained from chart review and speaking with the ED provider as family left earlier in the evening. Patient does not respond to questions and mumbles to herself during exam.     Per family, patient has had worsening neurological decline since December, now unable to walk.   Last Sunday, she began having AMS without any obvious signs or symptoms of infection. She was seen by her neurologist, who is located near [place] in [place], who had some concern about her shunt malfunctioning. Patient does open her eyes to sound, but does not respond in any other way.     CBC was significant for neutropenia and repeated. WBCs were 0.4 and 0.3 on repeat. Associated with fever. Evidence of UTI. No other evidence of infection on imaging or skin exam.  Given vanc/zosyn in ED.     Imaging on arrival at [facility] showed moderate ventriculomegaly, slightly progressed compared to imaging from 3/11/2020. Some subcortical white matter hypo-attenuation demonstrated at the level of he left frontal lobe, which could represent an age-indeterminate infarct. Some fluid noted in pelvis near shunt, could benefit from paracentesis and culture. \"    Subjective (past 24 hours):   No acute events overnight. Per SW who spoke with NH, nursing home was speaking to family about hospice. Pt is following commands. Pt does not give good conversation. Pt not complaining of pain. Past medical history, family history, social history and allergies reviewed again and is unchanged since admission.     ROS Unable to assess due to mentation     Medications:  Reviewed    Infusion Medications    lactated ringers 75 mL/hr at 03/19/21 7070     Scheduled Medications    sodium chloride flush  10 mL Intravenous 2 times per day    [Held by provider] enoxaparin  40 mg Subcutaneous Q12H    amLODIPine  5 mg Oral Daily    atorvastatin  20 mg Oral Nightly    losartan  100 mg Oral Daily    risperiDONE  0.5 mg Oral BID    senna  1 tablet Oral BID    [Held by provider] levETIRAcetam  1,000 mg Oral BID    calcium replacement protocol   Other RX Placeholder    piperacillin-tazobactam  3,375 mg Intravenous Q8H    vancomycin (VANCOCIN) intermittent dosing (placeholder)   Other RX Placeholder    vancomycin  1,750 mg Intravenous Q12H    nystatin  5 mL Mouth/Throat 4x Daily    levetiracetam  1,000 mg Intravenous Q12H    Tbo-Filgrastim  480 mcg Subcutaneous QPM     PRN Meds: sodium chloride flush, polyethylene glycol, acetaminophen **OR** acetaminophen, ondansetron **OR** ondansetron, simethicone, hydrALAZINE      Intake/Output Summary (Last 24 hours) at 3/19/2021 1407  Last data filed at 3/19/2021 0327  Gross per 24 hour   Intake 2204.52 ml   Output 400 ml   Net 1804.52 ml       Diet:  Diet NPO Effective Now    Exam:  /67   Pulse 75   Temp 98.5 °F (36.9 °C) (Oral)   Resp 18   Ht 5' 6\" (1.676 m)   Wt 279 lb 11.2 oz (126.9 kg)   SpO2 93%   BMI 45.14 kg/m²   General appearance: Chronically ill appearing white female   HEENT: Pupils equal, round, and reactive to light. Conjunctivae/corneas clear. Neck: Supple, with full range of motion. No jugular venous distention. Trachea midline. Respiratory:  Normal respiratory effort on RA. Clear to auscultation, bilaterally without Rales/Wheezes/Rhonchi. Cardiovascular: Regular rate and rhythm with normal S1/S2 without murmurs, rubs or gallops. Abdomen: Soft, non-tender, non-distended with normal bowel sounds. Musculoskeletal: passive and active ROM x 4 extremities. Skin: Skin color, texture, turgor normal.  No rashes or lesions. Neurologic:  Pt moving all 4 extremities. Pt following commands.    Psychiatric: Alert, following 2 step commands   Capillary Refill: Brisk,< 3 seconds   Peripheral Pulses: +2 palpable, equal bilaterally     Labs:   Recent Labs     03/17/21 2105 03/18/21  0424 03/19/21  0333   WBC 0.3* 0.8* 0.5*   HGB 13.8 14.6 10.9*   HCT 42.3 47.3* 34.4*    102* 99*     Recent Labs     03/17/21 1925 03/17/21  1946 03/18/21  0424   * 137* 136   K 4.5 4.4 4.8     --  99   CO2 22*  --  22*   BUN 10  --  10   CREATININE 0.5  --  0.6   CALCIUM 9.4  --  10.1     Recent Labs     03/17/21 1925 03/18/21  0424   AST 57* 129*   ALT 30 51   BILITOT 1.2 2.1*   ALKPHOS 237* 295*     Recent Labs     03/17/21 1945   INR 1.35*     Microbiology:    Blood culture #1:   Lab Results   Component Value Date    BC No growth-preliminary  03/17/2021     Urinalysis:      Lab Results   Component Value Date    NITRU NEGATIVE 03/17/2021    WBCUA 10-15 03/17/2021    BACTERIA MANY 03/17/2021    RBCUA 3-5 03/17/2021    BLOODU NEGATIVE 03/17/2021    GLUCOSEU NEGATIVE 03/17/2021       Radiology:  CT CHEST WO CONTRAST   Final Result   1. No acute intrathoracic findings. 2.  Minimal consolidation along the right lung base posteriorly, likely    representing atelectasis. This document has been electronically signed by: Eddie Bosch MD on    03/17/2021 11:10 PM      All CTs at this facility use dose modulation techniques and iterative    reconstructions, and/or weight-based dosing   when appropriate to reduce radiation to a low as reasonably achievable. CT ABDOMEN PELVIS WO CONTRAST Additional Contrast? None   Final Result   1. No acute intra-abdominal or pelvic findings. No bowel obstruction. No renal obstruction. 2.  Small amount of fluid present within the lower abdomen/pelvis. This    is likely secondary to the ventriculoperitoneal shunt which is in close    proximity to this region. This document has been electronically signed by: Eddie Bosch MD on    03/17/2021 11:03 PM      All CTs at this facility use dose modulation techniques and iterative    reconstructions, and/or weight-based dosing   when appropriate to reduce radiation to a low as reasonably achievable. XR SHUNT SERIES PLACEMENT (<4 VIEWS)   Final Result   1. There is a right-sided  shunt catheter. The visualized portions of the catheter. Intact. However, there is partial exclusion of the inferior aspect of the catheter overlying the lower pelvis. 2. There are mildly dilated gas-filled small bowel loops overlying the mid abdomen.  Differential considerations would include a possible focal ileus or partial small bowel obstruction. Consider further characterization with CT. **This report has been created using voice recognition software. It may contain minor errors which are inherent in voice recognition technology. **      Final report electronically signed by Dr. Miladis Thomas on 3/17/2021 8:39 PM      CT HEAD WO CONTRAST   Final Result   1. No acute intracranial hemorrhage or mass effect. 2. There is subcortical white matter hypoattenuation demonstrated at the level of the left frontal lobe as seen on axial images 20/7/2029. This may represent an age-indeterminate infarct. Although a remote infarct is favored given the appearance of    encephalomalacia on coronal series image 22.      3. There is moderate ventriculomegaly demonstrated. The overall degree of ventriculomegaly appears slightly progressed at the level of the anterior left lateral ventricle when compared to the prior examination dated 3/11/2020.      4. There is an anterior rightward approach ventricular peritoneal shunt catheter with the distal tip abutting the septum pellucidum on axial image 20. This is similar in position when compared to prior examination. This slightly deviates the septum    pellucidum towards the left by approximately 4 mm. However, there is no evidence of midline shift at the level of the pineal gland. **This report has been created using voice recognition software. It may contain minor errors which are inherent in voice recognition technology. **      Final report electronically signed by Dr. Miladis Thomas on 3/17/2021 8:34 PM      NM CSF SHUNT EVALUATION    (Results Pending)     Electronically signed by KRIS Frazier on 3/19/2021 at 2:07 PM

## 2021-03-20 LAB
BASOPHILS # BLD: 0 %
BASOPHILS ABSOLUTE: 0 THOU/MM3 (ref 0–0.1)
CREAT SERPL-MCNC: 0.5 MG/DL (ref 0.4–1.2)
EOSINOPHIL # BLD: 0 %
EOSINOPHILS ABSOLUTE: 0 THOU/MM3 (ref 0–0.4)
ERYTHROCYTE [DISTWIDTH] IN BLOOD BY AUTOMATED COUNT: 14.4 % (ref 11.5–14.5)
ERYTHROCYTE [DISTWIDTH] IN BLOOD BY AUTOMATED COUNT: 53.7 FL (ref 35–45)
GFR SERPL CREATININE-BSD FRML MDRD: > 90 ML/MIN/1.73M2
HCT VFR BLD CALC: 36.3 % (ref 37–47)
HEMOGLOBIN: 11.6 GM/DL (ref 12–16)
IMMATURE GRANS (ABS): 0 THOU/MM3 (ref 0–0.07)
IMMATURE GRANULOCYTES: 0 %
LYMPHOCYTES # BLD: 71.7 %
LYMPHOCYTES ABSOLUTE: 0.4 THOU/MM3 (ref 1–4.8)
MCH RBC QN AUTO: 32 PG (ref 26–33)
MCHC RBC AUTO-ENTMCNC: 32 GM/DL (ref 32.2–35.5)
MCV RBC AUTO: 100 FL (ref 81–99)
MONOCYTES # BLD: 23.9 %
MONOCYTES ABSOLUTE: 0.1 THOU/MM3 (ref 0.4–1.3)
NUCLEATED RED BLOOD CELLS: 0 /100 WBC
PLATELET # BLD: 101 THOU/MM3 (ref 130–400)
PMV BLD AUTO: 12.2 FL (ref 9.4–12.4)
RBC # BLD: 3.63 MILL/MM3 (ref 4.2–5.4)
SCAN OF BLOOD SMEAR: NORMAL
SEG NEUTROPHILS: 4.4 %
SEGMENTED NEUTROPHILS ABSOLUTE COUNT: 0 THOU/MM3 (ref 1.8–7.7)
WBC # BLD: 0.5 THOU/MM3 (ref 4.8–10.8)

## 2021-03-20 PROCEDURE — 2580000003 HC RX 258: Performed by: STUDENT IN AN ORGANIZED HEALTH CARE EDUCATION/TRAINING PROGRAM

## 2021-03-20 PROCEDURE — 6360000002 HC RX W HCPCS: Performed by: PHYSICIAN ASSISTANT

## 2021-03-20 PROCEDURE — 99232 SBSQ HOSP IP/OBS MODERATE 35: CPT | Performed by: PHYSICIAN ASSISTANT

## 2021-03-20 PROCEDURE — 82565 ASSAY OF CREATININE: CPT

## 2021-03-20 PROCEDURE — 92526 ORAL FUNCTION THERAPY: CPT

## 2021-03-20 PROCEDURE — 36415 COLL VENOUS BLD VENIPUNCTURE: CPT

## 2021-03-20 PROCEDURE — 85025 COMPLETE CBC W/AUTO DIFF WBC: CPT

## 2021-03-20 PROCEDURE — 92610 EVALUATE SWALLOWING FUNCTION: CPT

## 2021-03-20 PROCEDURE — 6360000002 HC RX W HCPCS: Performed by: STUDENT IN AN ORGANIZED HEALTH CARE EDUCATION/TRAINING PROGRAM

## 2021-03-20 PROCEDURE — 2580000003 HC RX 258: Performed by: PHYSICIAN ASSISTANT

## 2021-03-20 PROCEDURE — 6370000000 HC RX 637 (ALT 250 FOR IP): Performed by: STUDENT IN AN ORGANIZED HEALTH CARE EDUCATION/TRAINING PROGRAM

## 2021-03-20 PROCEDURE — 6360000002 HC RX W HCPCS: Performed by: SPECIALIST

## 2021-03-20 PROCEDURE — 6370000000 HC RX 637 (ALT 250 FOR IP): Performed by: PHYSICIAN ASSISTANT

## 2021-03-20 PROCEDURE — 2060000000 HC ICU INTERMEDIATE R&B

## 2021-03-20 RX ADMIN — LEVETIRACETAM 1000 MG: 100 INJECTION INTRAVENOUS at 06:07

## 2021-03-20 RX ADMIN — SENNOSIDES 8.6 MG: 8.6 TABLET, FILM COATED ORAL at 20:01

## 2021-03-20 RX ADMIN — NYSTATIN 500000 UNITS: 100000 SUSPENSION ORAL at 18:01

## 2021-03-20 RX ADMIN — TBO-FILGRASTIM 480 MCG: 480 INJECTION, SOLUTION SUBCUTANEOUS at 18:01

## 2021-03-20 RX ADMIN — NYSTATIN 500000 UNITS: 100000 SUSPENSION ORAL at 12:34

## 2021-03-20 RX ADMIN — PIPERACILLIN AND TAZOBACTAM 3375 MG: 3; .375 INJECTION, POWDER, LYOPHILIZED, FOR SOLUTION INTRAVENOUS at 20:04

## 2021-03-20 RX ADMIN — SENNOSIDES 8.6 MG: 8.6 TABLET, FILM COATED ORAL at 08:41

## 2021-03-20 RX ADMIN — SODIUM CHLORIDE, POTASSIUM CHLORIDE, SODIUM LACTATE AND CALCIUM CHLORIDE: 600; 310; 30; 20 INJECTION, SOLUTION INTRAVENOUS at 04:32

## 2021-03-20 RX ADMIN — PIPERACILLIN AND TAZOBACTAM 3375 MG: 3; .375 INJECTION, POWDER, LYOPHILIZED, FOR SOLUTION INTRAVENOUS at 14:07

## 2021-03-20 RX ADMIN — NYSTATIN 500000 UNITS: 100000 SUSPENSION ORAL at 08:41

## 2021-03-20 RX ADMIN — RISPERIDONE 0.5 MG: 0.25 TABLET ORAL at 08:41

## 2021-03-20 RX ADMIN — LEVETIRACETAM 1000 MG: 100 INJECTION INTRAVENOUS at 18:01

## 2021-03-20 RX ADMIN — AMLODIPINE BESYLATE 5 MG: 5 TABLET ORAL at 08:40

## 2021-03-20 RX ADMIN — NYSTATIN 500000 UNITS: 100000 SUSPENSION ORAL at 20:07

## 2021-03-20 RX ADMIN — ATORVASTATIN CALCIUM 20 MG: 20 TABLET, FILM COATED ORAL at 20:00

## 2021-03-20 RX ADMIN — PIPERACILLIN AND TAZOBACTAM 3375 MG: 3; .375 INJECTION, POWDER, LYOPHILIZED, FOR SOLUTION INTRAVENOUS at 03:59

## 2021-03-20 RX ADMIN — LOSARTAN POTASSIUM 100 MG: 100 TABLET, FILM COATED ORAL at 08:41

## 2021-03-20 RX ADMIN — RISPERIDONE 0.5 MG: 0.25 TABLET ORAL at 19:59

## 2021-03-20 RX ADMIN — VANCOMYCIN HYDROCHLORIDE 1500 MG: 5 INJECTION, POWDER, LYOPHILIZED, FOR SOLUTION INTRAVENOUS at 11:31

## 2021-03-20 RX ADMIN — VANCOMYCIN HYDROCHLORIDE 1500 MG: 5 INJECTION, POWDER, LYOPHILIZED, FOR SOLUTION INTRAVENOUS at 00:05

## 2021-03-20 RX ADMIN — SODIUM CHLORIDE, POTASSIUM CHLORIDE, SODIUM LACTATE AND CALCIUM CHLORIDE: 600; 310; 30; 20 INJECTION, SOLUTION INTRAVENOUS at 22:26

## 2021-03-20 ASSESSMENT — PAIN SCALES - GENERAL
PAINLEVEL_OUTOF10: 0
PAINLEVEL_OUTOF10: 0

## 2021-03-20 NOTE — PROGRESS NOTES
Spoke with [facility] with general surgery. Abbey Valente states that Dr Maureen aGrcia is aware of the pt, no need to see at this time, may assist with  shunt. Message sent back to update that Dr Grady Tejeda is planning surgery for Monday.

## 2021-03-20 NOTE — PROGRESS NOTES
Progress note: Infectious diseases    Patient - Jenny Singh,  Age - 61 y.o.    - 1960      Room Number - 4U-31/868-C   MRN -  [id number]   Acct # - [de-identified]  Date of Admission -  3/17/2021  6:41 PM    SUBJECTIVE:   Shuntogram report noted. Suspect of distal obstuction due to non visualization of contrast  CSF not suggestive of infection    OBJECTIVE   VITALS    height is 5' 6\" (1.676 m) and weight is 283 lb (128.4 kg). Her oral temperature is 98.9 °F (37.2 °C). Her blood pressure is 141/66 (abnormal) and her pulse is 75. Her respiration is 20 and oxygen saturation is 98%.        Wt Readings from Last 3 Encounters:   21 283 lb (128.4 kg)   20 (!) 331 lb 9.6 oz (150.4 kg)       I/O (24 Hours)    Intake/Output Summary (Last 24 hours) at 3/20/2021 1649  Last data filed at 3/20/2021 1554  Gross per 24 hour   Intake 3148.84 ml   Output 2150 ml   Net 998.84 ml       General Appearance  Sick looking, lethargic  HEENT - normocephalic, atraumatic, pink conjunctiva,  anicteric sclera  Neck - Supple, no mass  Lungs -  Bilateral   air entry, no rhonchi, no wheeze  Cardiovascular - Heart sounds are normal.    Abdomen - soft, not distended, nontender,   Neurologic -lethargic  Skin - diffuse macular rash, dry skin and scaly  Extremities - chronic leg edema    MEDICATIONS:      sodium chloride flush  10 mL Intravenous 2 times per day    [Held by provider] enoxaparin  40 mg Subcutaneous Q12H    amLODIPine  5 mg Oral Daily    atorvastatin  20 mg Oral Nightly    losartan  100 mg Oral Daily    risperiDONE  0.5 mg Oral BID    senna  1 tablet Oral BID    [Held by provider] levETIRAcetam  1,000 mg Oral BID    calcium replacement protocol   Other RX Placeholder    piperacillin-tazobactam  3,375 mg Intravenous Q8H    nystatin  5 mL Mouth/Throat 4x Daily    levetiracetam  1,000 mg Intravenous Q12H    Tbo-Filgrastim  480 mcg Subcutaneous QPM      lactated ringers 75 mL/hr at 03/20/21 0432     sodium chloride flush, polyethylene glycol, acetaminophen **OR** acetaminophen, ondansetron **OR** ondansetron, simethicone, hydrALAZINE      LABS:     CBC:   Recent Labs     03/18/21 0424 03/19/21  0333 03/20/21  0348   WBC 0.8* 0.5* 0.5*   HGB 14.6 10.9* 11.6*   * 99* 101*     BMP:    Recent Labs     03/17/21 1925 03/17/21 1946 03/18/21 0424 03/20/21 0348   * 137* 136  --    K 4.5 4.4 4.8  --      --  99  --    CO2 22*  --  22*  --    BUN 10  --  10  --    CREATININE 0.5  --  0.6 0.5   GLUCOSE 135*  --  136*  --      Calcium:  Recent Labs     03/18/21 0424   CALCIUM 10.1     Ionized Calcium:No results for input(s): IONCA in the last 72 hours. Magnesium:  Recent Labs     03/17/21 1925   MG 1.9    INR:   Recent Labs     03/17/21 1945   INR 1.35*     Hepatic:   Recent Labs     03/17/21 1925 03/18/21 0424   ALKPHOS 237* 295*   ALT 30 51   AST 57* 129*   PROT 7.5 8.6*   BILITOT 1.2 2.1*   LABALBU 2.9* 3.4*        CULTURES:   UA:   Recent Labs     03/17/21 2040   PHUR 6.5   COLORU DK YELLOW*   PROTEINU 100*   BLOODU NEGATIVE   RBCUA 3-5   WBCUA 10-15   BACTERIA MANY   NITRU NEGATIVE   GLUCOSEU NEGATIVE   BILIRUBINUR MODERATE*   UROBILINOGEN 2.0*   KETUA 15*     Micro:   Lab Results   Component Value Date    BC No growth-preliminary  03/17/2021          Problem list of patient:     Patient Active Problem List   Diagnosis Code    Neutropenic fever ([facility] [address] D70.9, R50.81    Septicemia ([facility] [address] A41.9    Altered mental status R41.82         ASSESSMENT/PLAN   Change in mental state: unchanged  Hx of VPShunt: non functioning> CSF negative for infection   Neutropenia with fever: cause not clear: concern for underlying hematologic disease or drugs  Continue current treatment, vancomycin stopped.       Nicolle Godoy MD, [address] 3/20/2021 4:49 PM

## 2021-03-20 NOTE — PLAN OF CARE
Problem: Skin Integrity:  Goal: Absence of new skin breakdown  Description: Absence of new skin breakdown  Outcome: Ongoing  Note: No signs of new skin breakdown noted with each assessment this shift. Skin warm, dry, and intact except where otherwise noted in head-to-toe assessment. Mucous membranes pink and moist. Assistance with turns/ambulation given as needed. Problem: Falls - Risk of:  Goal: Will remain free from falls  Description: Will remain free from falls  Outcome: Ongoing  Note: Patient remained free from falls this shift. Bed is in low position with alarm on and siderails up x2. Education given on use of call light and patient voiced understanding. Call light and beside table within reach. Arm band and falling star in place. Hourly rounds completed. Will continue to monitor. Problem: Discharge Planning:  Goal: Discharged to appropriate level of care  Description: Discharged to appropriate level of care  Outcome: Ongoing  Note: Discharge planning in process and discussed with patient/family. Social work consulted for any additional needs. Care manager aware of discharge needs. Pt plans to discharge back to [address] [place] [address] at this time. Problem: Injury - Risk of, Physical Injury:  Goal: Will remain free from falls  Description: Will remain free from falls  Outcome: Ongoing  Note: Patient remained free from falls this shift. Bed is in low position with alarm on and siderails up x2. Education given on use of call light and patient voiced understanding. Call light and beside table within reach. Arm band and falling star in place. Hourly rounds completed. Will continue to monitor. Problem: Mood - Altered:  Goal: Mood stable  Description: Mood stable  Outcome: Ongoing  Note: Pt's mood appears stable at this time.       Problem: Sleep Pattern Disturbance:  Goal: Appears well-rested  Description: Appears well-rested  Outcome: Ongoing  Note: Pt appears to be resting well at this time. Problem: Physical Regulation:  Goal: Ability to maintain a body temperature in the normal range will improve  Description: Ability to maintain a body temperature in the normal range will improve  Outcome: Ongoing  Vitals:    03/19/21 1109 03/19/21 1145 03/19/21 1615 03/19/21 1957   BP: 109/64 125/67 129/61 123/66   Pulse: 77 75 73 74   Resp: 18 18 18 18   Temp: 98.2 °F (36.8 °C) 98.5 °F (36.9 °C) 98.5 °F (36.9 °C) 98.1 °F (36.7 °C)   TempSrc: Oral Oral Oral Oral   SpO2: 94% 93% 97% 96%   Weight:       Height:           Will continue to monitor. Plan of care discussed with pt and family. Pt verbalizes understand.

## 2021-03-20 NOTE — PROGRESS NOTES
[facility]  INPATIENT SPEECH THERAPY  [facility]A  DAILY NOTE    TIME   SLP Individual Minutes  Time In: 08  Time Out: 18  Minutes: 16  Timed Code Treatment Minutes: 0 Minutes       Date: 3/20/2021  Patient Name: Selene Gillis      CSN: [id number]   : 1960  (61 y.o.)  Gender: female   Referring Physician:  KRIS Farfan  Diagnosis: Neutropenic Fever   Secondary Diagnosis: Dysphagia  Precautions: Aspiration   Current Diet: NPO; no means of nutrition at this time   Swallowing Strategies: NPO- oral care   Date of Last MBS/FEES: Not Applicable    Pain:  No pain reported. Subjective:  Patient seen at bedside; alert and awake. Patient with expressive verbal output; nonsensical. No family present. Short-Term Goals:  SHORT TERM GOAL #1:  Goal 1: Patient will consume conservative PO trials of ice chips, thin liquids and puree with SLP ONLY in order to determine readiness for initiation of oral diet vs need for instrumental evaluation  INTERVENTIONS:   Patient consumed PO trials of 12-16 oz of thin liquids via straw; patient required direct verbal cues to attend to straw presentation; following achievement of appropriate attention patient demonstrated with appropriate initiation, timely swallow within consecutive straw drinks, no s/s of aspiration. Patient consumed PO trial of ice chips; limited-0 effort to masticate ice chip with allowing entire ice chip to melt within oral cavity. Patient stating \"Oh baby let is melt. \" ST encouraged mastication of ice chip; patient refused to masticate, eventual swallow reflex achieved provided max cues-no s/s of aspiration. Patient consumed PO trials of puree x2; severely uncoordinated oral mastication with POOR awareness to bolus within oral cavity and multiple efforts to \"spit material out. \" Patient UNSAFE to consume textures at this time.      ST recommending NPO with the exception of PO medications WHOLE with thin liquids via straw and verbal cues \"close mouth and swallow\" + approval of thin liquids via straw as requested by patient provided by nursing/SLP ONLY. RN Tesfaye [place] updated. SHORT TERM GOAL #2:  Goal 2: Complete extensive oral care in order to improve overall oral hygeine. INTERVENTIONS: ST completed extensive oral care utilizing hydrogen peroxide and water solution; ST removed copious amounts of thick/hard secretions from hard palliate. Patient with only fair tolerance of oral care measures overall with accepting of toothett to hard palliate only, patient NOT accepting toothet to lingual surface stating \"not again, no more. \"     Patient with significantly improved oral hygiene compared to initial clinical swallow evaluation; recommend continued aggressive oral care measures. SHORT TERM GOAL #3:  Goal 3: Complete cognitive/speech/language evaluation and determine goals and POC as clinically appropriate  INTERVENTIONS: Patient with limited command following and nonsensical speech; poor ability to express wants/needs. Delayed processing noted. Long-Term Goals: No LTGs due to short ELOS              EDUCATION:  Learner: Patient  Education:  Reviewed results and recommendations of this evaluation, Reviewed diet and strategies, Reviewed signs, symptoms and risks of aspiration, Reviewed ST goals and Plan of Care, Reviewed recommendations for follow-up and Education Related to Potential Risks and Complications Due to Impairment/Illness/Injury  Evaluation of Education: Needs further instruction, No evidence of learning and Family not present    ASSESSMENT/PLAN:  Activity Tolerance:  Patient tolerance of  treatment: fair. Assessment/Plan: Patient progressing toward established goals. Continues to require skilled care of licensed speech pathologist to progress toward achievement of established goals and plan of care. .     Plan for Next Session: PO trials to determine readiness for oral diet      Adriana Andrews Sukhi Epstein M.S. [id number] [id number]

## 2021-03-20 NOTE — PLAN OF CARE
Problem: Falls - Risk of:  Goal: Absence of physical injury  Description: Absence of physical injury  Outcome: Met This Shift  Note: Bed low and locked. 3/4 siderails up to prevent injury and aid in mobility. Call light in reach and hourly rounding utilized to decrease risk of falls. Problem: Injury - Risk of, Physical Injury:  Goal: Absence of physical injury  Description: Absence of physical injury  Outcome: Met This Shift  Note: No physical injury this shift. Problem: Skin Integrity:  Goal: Will show no infection signs and symptoms  Description: Will show no infection signs and symptoms  Outcome: Ongoing  Note: Patient is afebrile and respirations are unlabored. Vancomycin and Zosyn given per order. Problem: Skin Integrity:  Goal: Absence of new skin breakdown  Description: Absence of new skin breakdown  Outcome: Ongoing  Note: Patient turned every 2 hours, heels floated while in bed. Skin assessed with routine assessments. No new skin breakdown noted. Problem: Falls - Risk of:  Goal: Will remain free from falls  Description: Will remain free from falls  Outcome: Ongoing  Note: Bed alarm is on, bed is low and locked. 3/4 siderails are raised for safety and mobility. Call light is in reach and hourly rounding is utilized to reduce risk of falls. Problem: Confusion - Acute:  Goal: Absence of continued neurological deterioration signs and symptoms  Description: Absence of continued neurological deterioration signs and symptoms  Outcome: Ongoing  Note: Patient oriented x4 this morning. However, patient continues to be lethargic and drowsy. Patient able to follow simple commands and cuing.      Problem: Confusion - Acute:  Goal: Mental status will be restored to baseline  Description: Mental status will be restored to baseline  Outcome: Ongoing     Problem: Discharge Planning:  Goal: Ability to perform activities of daily living will improve  Description: Ability to perform activities of daily living will improve  Outcome: Ongoing  Note: Patient continues to be lethargic today. Patient able to follow simple commands and cuing to complete ADLs. Problem: Discharge Planning:  Goal: Discharged to appropriate level of care  Description: Discharged to appropriate level of care  Outcome: Ongoing  Note: Speech therapy evaluated patient today. Recommended a thin liquids with a straw while taking medications one pill at a time with the nurse. NPO at all other times. OT attempted to work with patient but was unable to due to lethargy. Case management and social work are following. Plan is to return to [facility] [employer] [employer] at this time. Problem: Injury - Risk of, Physical Injury:  Goal: Will remain free from falls  Description: Will remain free from falls  Outcome: Ongoing  Note: Bed alarm is on, bed is low and locked. 3/4 siderails are raised for safety and mobility. Call light is in reach and hourly rounding is utilized to reduce risk of falls. Problem: Mood - Altered:  Goal: Mood stable  Description: Mood stable  Outcome: Ongoing  Note: Patient is drowsy and lethargic today. Patient is calm and cooperative and able to follow simple commands. Problem: Mood - Altered:  Goal: Absence of abusive behavior  Description: Absence of abusive behavior  Outcome: Ongoing  Note: Patient is calm and cooperative. Able to follow simple commands.      Problem: Mood - Altered:  Goal: Verbalizations of feeling emotionally comfortable while being cared for will increase  Description: Verbalizations of feeling emotionally comfortable while being cared for will increase  Outcome: Ongoing     Problem: Psychomotor Activity - Altered:  Goal: Absence of psychomotor disturbance signs and symptoms  Description: Absence of psychomotor disturbance signs and symptoms  Outcome: Ongoing     Problem: Sensory Perception - Impaired:  Goal: Demonstrations of improved sensory functioning will increase  Description: Demonstrations of Planning:  Goal: Participates in care planning  Description: Participates in care planning  Outcome: Not Met This Shift  Note: Patient does not participate in care planning due to drowsiness. Care plan reviewed with patient and family. Patient and family voice understanding.

## 2021-03-20 NOTE — PROGRESS NOTES
Hospitalist Progress Note      Patient:  Diamond Montoya    Unit/Bed:4A-16/016-A  YOB: 1960  MRN: [id number]   Acct: [de-identified]   PCP: Delia Dodson MD  Date of Admission: 3/17/2021    Assessment/Plan:    1. Acute encephalopathy: Per family, she is more lethargic than baseline but she lives at [facility] and has been chronically encephalopathic.  shunt malfunction. Neurosurgery consulted. ID consulted. Imaging shows highly suspicious for occlusion - plan for surgery on Monday. ABG is WNL. 2. Sepsis, unknown etiology: Urine culture & Blood cultures pending. CSF pending. IVF. Pt is afebrile. Continue IV ABX. ID consulted. 3. History of ruptured brain aneurysm status post  shunt 2019: Aware. Neurosurgery consulted. Shuntogram shows high suspicious for occlusion. Pt is following two step commands. 4. Neutropenia, severe: Smudge cells. Hematology consulted. WBC 0.5.   5. Pancytopenia: WBC 0.5, Hgb 10.9, Plts 99. Hgb trended down to 10.9. Will recheck CBC at 1800. Plts are also trending down from 150 to 99 to 101. Unsure if this is dilutional. Hematology started Granix. 6. Elevated LFTs: LFT are elevated. No known baseline. Will recheck LFTs in the AM. CT A/P did not any acute issues of liver. 7. History of seizures: Continue home medications   8. Essential hypertension: BP has been controlled. Continue home medications   9. Hyperlipidemia: Statin. Chief Complaint: AMS    Initial H and P:-    \"61 y.o. female with a past medical history spontaneous intraparenchymal intracranial hemorrhage due to aneurysm (s/p  shunt 2019), who presented to [address] [place] [address] with altered mental status, worsening over the last week. History is obtained from chart review and speaking with the ED provider as family left earlier in the evening.   Patient does not respond to questions and mumbles to herself during exam.     Per family, patient has had worsening neurological decline since December, now unable to walk. Last Sunday, she began having AMS without any obvious signs or symptoms of infection. She was seen by her neurologist, who is located near their [place] in [place], who had some concern about her shunt malfunctioning. Patient does open her eyes to sound, but does not respond in any other way.     CBC was significant for neutropenia and repeated. WBCs were 0.4 and 0.3 on repeat. Associated with fever. Evidence of UTI. No other evidence of infection on imaging or skin exam.  Given vanc/zosyn in ED.     Imaging on arrival at [facility] showed moderate ventriculomegaly, slightly progressed compared to imaging from 3/11/2020. Some subcortical white matter hypo-attenuation demonstrated at the level of he left frontal lobe, which could represent an age-indeterminate infarct. Some fluid noted in pelvis near shunt, could benefit from paracentesis and culture. \"    3/19: No acute events overnight. Per SW who spoke with NH, nursing home was speaking to family about hospice. Pt is following commands. Pt does not give good conversation. Pt not complaining of pain. Subjective (past 24 hours):   No acute events overnight. Pt is alert but lethargic. Shuntogram showed possible occulusion. Past medical history, family history, social history and allergies reviewed again and is unchanged since admission.     ROS Unable to assess due to mentation     Medications:  Reviewed    Infusion Medications    lactated ringers 75 mL/hr at 03/20/21 0432     Scheduled Medications    vancomycin  1,500 mg Intravenous Q12H    sodium chloride flush  10 mL Intravenous 2 times per day    [Held by provider] enoxaparin  40 mg Subcutaneous Q12H    amLODIPine  5 mg Oral Daily    atorvastatin  20 mg Oral Nightly    losartan  100 mg Oral Daily    risperiDONE  0.5 mg Oral BID    senna  1 tablet Oral BID    [Held by provider] levETIRAcetam  1,000 mg Oral BID    calcium replacement protocol   Other RX Placeholder    piperacillin-tazobactam  3,375 mg Intravenous Q8H    vancomycin (VANCOCIN) intermittent dosing (placeholder)   Other RX Placeholder    nystatin  5 mL Mouth/Throat 4x Daily    levetiracetam  1,000 mg Intravenous Q12H    Tbo-Filgrastim  480 mcg Subcutaneous QPM     PRN Meds: sodium chloride flush, polyethylene glycol, acetaminophen **OR** acetaminophen, ondansetron **OR** ondansetron, simethicone, hydrALAZINE      Intake/Output Summary (Last 24 hours) at 3/20/2021 1329  Last data filed at 3/20/2021 0315  Gross per 24 hour   Intake 2818.35 ml   Output 400 ml   Net 2418.35 ml       Diet:  Diet NPO Effective Now Exceptions are: Sips of Water with Meds, Other (Specify); Specify Other: Ok for thin liquids via straw administered by RN ONLY for pleasure, OK medications WHOLE (individual-one at a time) with thin liquids    Exam:  BP 94/61   Pulse 77   Temp 98.4 °F (36.9 °C) (Axillary)   Resp 20   Ht 5' 6\" (1.676 m)   Wt 283 lb (128.4 kg)   SpO2 98%   BMI 45.68 kg/m²   General appearance: Chronically ill appearing white female   HEENT: Pupils equal, round, and reactive to light. Conjunctivae/corneas clear. Neck: Supple, with full range of motion. No jugular venous distention. Trachea midline. Respiratory:  Normal respiratory effort on RA. Clear to auscultation, bilaterally without Rales/Wheezes/Rhonchi. Cardiovascular: Regular rate and rhythm with normal S1/S2 without murmurs, rubs or gallops. Abdomen: Soft, non-tender, non-distended with normal bowel sounds. Musculoskeletal: passive and active ROM x 4 extremities. Skin: Skin color, texture, turgor normal.  No rashes or lesions. Neurologic:  Pt moving all 4 extremities. Pt following commands.    Psychiatric: Alert, following 2 step commands, drowsy  Capillary Refill: Brisk,< 3 seconds   Peripheral Pulses: +2 palpable, equal bilaterally     Labs:   Recent Labs     03/18/21  0424 03/19/21  7261 03/20/21  0348   WBC 0.8* 0.5* 0.5*   HGB 14.6 10.9* 11.6*   HCT 47.3* 34.4* 36.3*   * 99* 101*     Recent Labs     03/17/21 1925 03/17/21 1946 03/18/21 0424 03/20/21 0348   * 137* 136  --    K 4.5 4.4 4.8  --      --  99  --    CO2 22*  --  22*  --    BUN 10  --  10  --    CREATININE 0.5  --  0.6 0.5   CALCIUM 9.4  --  10.1  --      Recent Labs     03/17/21 1925 03/18/21 0424   AST 57* 129*   ALT 30 51   BILITOT 1.2 2.1*   ALKPHOS 237* 295*     Recent Labs     03/17/21 1945   INR 1.35*     Microbiology:    Blood culture #1:   Lab Results   Component Value Date    BC No growth-preliminary  03/17/2021     Urinalysis:      Lab Results   Component Value Date    NITRU NEGATIVE 03/17/2021    WBCUA 10-15 03/17/2021    BACTERIA MANY 03/17/2021    RBCUA 3-5 03/17/2021    BLOODU NEGATIVE 03/17/2021    GLUCOSEU NEGATIVE 03/17/2021       Radiology:  NM CSF SHUNT EVALUATION   Final Result      Absence of activity in distal shunt catheter tubing and in the pelvis, highly suspicious for occlusion. Final report electronically signed by Dr. Laurie Holt on 3/19/2021 4:01 PM      CT CHEST WO CONTRAST   Final Result   1. No acute intrathoracic findings. 2.  Minimal consolidation along the right lung base posteriorly, likely    representing atelectasis. This document has been electronically signed by: Polo Gill MD on    03/17/2021 11:10 PM      All CTs at this facility use dose modulation techniques and iterative    reconstructions, and/or weight-based dosing   when appropriate to reduce radiation to a low as reasonably achievable. CT ABDOMEN PELVIS WO CONTRAST Additional Contrast? None   Final Result   1. No acute intra-abdominal or pelvic findings. No bowel obstruction. No renal obstruction. 2.  Small amount of fluid present within the lower abdomen/pelvis. This    is likely secondary to the ventriculoperitoneal shunt which is in close    proximity to this region.       This document has been electronically signed by: Jong Lancaster MD on    03/17/2021 11:03 PM      All CTs at this facility use dose modulation techniques and iterative    reconstructions, and/or weight-based dosing   when appropriate to reduce radiation to a low as reasonably achievable. XR SHUNT SERIES PLACEMENT (<4 VIEWS)   Final Result   1. There is a right-sided  shunt catheter. The visualized portions of the catheter. Intact. However, there is partial exclusion of the inferior aspect of the catheter overlying the lower pelvis. 2. There are mildly dilated gas-filled small bowel loops overlying the mid abdomen. Differential considerations would include a possible focal ileus or partial small bowel obstruction. Consider further characterization with CT. **This report has been created using voice recognition software. It may contain minor errors which are inherent in voice recognition technology. **      Final report electronically signed by Dr. Domingo Lundberg on 3/17/2021 8:39 PM      CT HEAD WO CONTRAST   Final Result   1. No acute intracranial hemorrhage or mass effect. 2. There is subcortical white matter hypoattenuation demonstrated at the level of the left frontal lobe as seen on axial images 20/7/2029. This may represent an age-indeterminate infarct. Although a remote infarct is favored given the appearance of    encephalomalacia on coronal series image 22.      3. There is moderate ventriculomegaly demonstrated. The overall degree of ventriculomegaly appears slightly progressed at the level of the anterior left lateral ventricle when compared to the prior examination dated 3/11/2020.      4. There is an anterior rightward approach ventricular peritoneal shunt catheter with the distal tip abutting the septum pellucidum on axial image 20. This is similar in position when compared to prior examination. This slightly deviates the septum    pellucidum towards the left by approximately 4 mm.  However, there is no evidence of midline shift at the level of the pineal gland. **This report has been created using voice recognition software. It may contain minor errors which are inherent in voice recognition technology. **      Final report electronically signed by Dr. Trixie Brumfield on 3/17/2021 8:34 PM        Electronically signed by KRIS Ramirez on 3/20/2021 at 1:29 PM

## 2021-03-21 ENCOUNTER — ANESTHESIA EVENT (OUTPATIENT)
Dept: OPERATING ROOM | Age: 61
DRG: 022 | End: 2021-03-21
Payer: MEDICAID

## 2021-03-21 LAB
ALBUMIN SERPL-MCNC: 2.3 G/DL (ref 3.5–5.1)
ALP BLD-CCNC: 297 U/L (ref 38–126)
ALT SERPL-CCNC: 76 U/L (ref 11–66)
ANION GAP SERPL CALCULATED.3IONS-SCNC: 8 MEQ/L (ref 8–16)
AST SERPL-CCNC: 127 U/L (ref 5–40)
ATYPICAL LYMPHOCYTES: ABNORMAL %
BASOPHILS # BLD: 0 %
BASOPHILS ABSOLUTE: 0 THOU/MM3 (ref 0–0.1)
BILIRUB SERPL-MCNC: 1 MG/DL (ref 0.3–1.2)
BUN BLDV-MCNC: 5 MG/DL (ref 7–22)
CALCIUM SERPL-MCNC: 9.9 MG/DL (ref 8.5–10.5)
CHLORIDE BLD-SCNC: 104 MEQ/L (ref 98–111)
CO2: 26 MEQ/L (ref 23–33)
CREAT SERPL-MCNC: 0.7 MG/DL (ref 0.4–1.2)
EOSINOPHIL # BLD: 0 %
EOSINOPHILS ABSOLUTE: 0 THOU/MM3 (ref 0–0.4)
ERYTHROCYTE [DISTWIDTH] IN BLOOD BY AUTOMATED COUNT: 14.7 % (ref 11.5–14.5)
ERYTHROCYTE [DISTWIDTH] IN BLOOD BY AUTOMATED COUNT: 54.6 FL (ref 35–45)
GFR SERPL CREATININE-BSD FRML MDRD: 85 ML/MIN/1.73M2
GLUCOSE BLD-MCNC: 100 MG/DL (ref 70–108)
HCT VFR BLD CALC: 41.4 % (ref 37–47)
HEMOGLOBIN: 13.4 GM/DL (ref 12–16)
IMMATURE GRANS (ABS): 0 THOU/MM3 (ref 0–0.07)
IMMATURE GRANULOCYTES: 0 %
LYMPHOCYTES # BLD: 61.1 %
LYMPHOCYTES ABSOLUTE: 0.4 THOU/MM3 (ref 1–4.8)
MAGNESIUM: 2 MG/DL (ref 1.6–2.4)
MCH RBC QN AUTO: 32.3 PG (ref 26–33)
MCHC RBC AUTO-ENTMCNC: 32.4 GM/DL (ref 32.2–35.5)
MCV RBC AUTO: 99.8 FL (ref 81–99)
MONOCYTES # BLD: 31.9 %
MONOCYTES ABSOLUTE: 0.2 THOU/MM3 (ref 0.4–1.3)
NUCLEATED RED BLOOD CELLS: 0 /100 WBC
PLATELET # BLD: 103 THOU/MM3 (ref 130–400)
PMV BLD AUTO: 12.8 FL (ref 9.4–12.4)
POTASSIUM REFLEX MAGNESIUM: 3.4 MEQ/L (ref 3.5–5.2)
RBC # BLD: 4.15 MILL/MM3 (ref 4.2–5.4)
SCAN OF BLOOD SMEAR: NORMAL
SEG NEUTROPHILS: 7 %
SEGMENTED NEUTROPHILS ABSOLUTE COUNT: 0 THOU/MM3 (ref 1.8–7.7)
SMUDGE CELLS: PRESENT
SODIUM BLD-SCNC: 138 MEQ/L (ref 135–145)
TOTAL PROTEIN: 7 G/DL (ref 6.1–8)
WBC # BLD: 0.7 THOU/MM3 (ref 4.8–10.8)

## 2021-03-21 PROCEDURE — 6360000002 HC RX W HCPCS: Performed by: PHYSICIAN ASSISTANT

## 2021-03-21 PROCEDURE — 6360000002 HC RX W HCPCS: Performed by: SPECIALIST

## 2021-03-21 PROCEDURE — 83735 ASSAY OF MAGNESIUM: CPT

## 2021-03-21 PROCEDURE — 6370000000 HC RX 637 (ALT 250 FOR IP): Performed by: STUDENT IN AN ORGANIZED HEALTH CARE EDUCATION/TRAINING PROGRAM

## 2021-03-21 PROCEDURE — 99232 SBSQ HOSP IP/OBS MODERATE 35: CPT | Performed by: PHYSICIAN ASSISTANT

## 2021-03-21 PROCEDURE — 2060000000 HC ICU INTERMEDIATE R&B

## 2021-03-21 PROCEDURE — 2580000003 HC RX 258: Performed by: PHYSICIAN ASSISTANT

## 2021-03-21 PROCEDURE — APPSS60 APP SPLIT SHARED TIME 46-60 MINUTES: Performed by: PHYSICIAN ASSISTANT

## 2021-03-21 PROCEDURE — 85025 COMPLETE CBC W/AUTO DIFF WBC: CPT

## 2021-03-21 PROCEDURE — 2580000003 HC RX 258: Performed by: STUDENT IN AN ORGANIZED HEALTH CARE EDUCATION/TRAINING PROGRAM

## 2021-03-21 PROCEDURE — 6360000002 HC RX W HCPCS: Performed by: STUDENT IN AN ORGANIZED HEALTH CARE EDUCATION/TRAINING PROGRAM

## 2021-03-21 PROCEDURE — 36415 COLL VENOUS BLD VENIPUNCTURE: CPT

## 2021-03-21 PROCEDURE — 6370000000 HC RX 637 (ALT 250 FOR IP): Performed by: PHYSICIAN ASSISTANT

## 2021-03-21 PROCEDURE — 80053 COMPREHEN METABOLIC PANEL: CPT

## 2021-03-21 RX ORDER — POTASSIUM CHLORIDE 7.45 MG/ML
10 INJECTION INTRAVENOUS PRN
Status: DISCONTINUED | OUTPATIENT
Start: 2021-03-21 | End: 2021-03-29 | Stop reason: HOSPADM

## 2021-03-21 RX ORDER — POTASSIUM CHLORIDE 20 MEQ/1
40 TABLET, EXTENDED RELEASE ORAL PRN
Status: DISCONTINUED | OUTPATIENT
Start: 2021-03-21 | End: 2021-03-29 | Stop reason: HOSPADM

## 2021-03-21 RX ADMIN — NYSTATIN 500000 UNITS: 100000 SUSPENSION ORAL at 19:53

## 2021-03-21 RX ADMIN — PIPERACILLIN AND TAZOBACTAM 3375 MG: 3; .375 INJECTION, POWDER, LYOPHILIZED, FOR SOLUTION INTRAVENOUS at 03:31

## 2021-03-21 RX ADMIN — PIPERACILLIN AND TAZOBACTAM 3375 MG: 3; .375 INJECTION, POWDER, LYOPHILIZED, FOR SOLUTION INTRAVENOUS at 20:45

## 2021-03-21 RX ADMIN — TBO-FILGRASTIM 480 MCG: 480 INJECTION, SOLUTION SUBCUTANEOUS at 18:53

## 2021-03-21 RX ADMIN — RISPERIDONE 0.5 MG: 0.25 TABLET ORAL at 19:53

## 2021-03-21 RX ADMIN — LEVETIRACETAM 1000 MG: 100 INJECTION INTRAVENOUS at 18:53

## 2021-03-21 RX ADMIN — SENNOSIDES 8.6 MG: 8.6 TABLET, FILM COATED ORAL at 19:53

## 2021-03-21 RX ADMIN — SODIUM CHLORIDE, POTASSIUM CHLORIDE, SODIUM LACTATE AND CALCIUM CHLORIDE: 600; 310; 30; 20 INJECTION, SOLUTION INTRAVENOUS at 12:12

## 2021-03-21 RX ADMIN — POTASSIUM BICARBONATE 40 MEQ: 782 TABLET, EFFERVESCENT ORAL at 09:49

## 2021-03-21 RX ADMIN — ATORVASTATIN CALCIUM 20 MG: 20 TABLET, FILM COATED ORAL at 19:53

## 2021-03-21 RX ADMIN — PIPERACILLIN AND TAZOBACTAM 3375 MG: 3; .375 INJECTION, POWDER, LYOPHILIZED, FOR SOLUTION INTRAVENOUS at 12:13

## 2021-03-21 RX ADMIN — NYSTATIN 500000 UNITS: 100000 SUSPENSION ORAL at 12:13

## 2021-03-21 RX ADMIN — SENNOSIDES 8.6 MG: 8.6 TABLET, FILM COATED ORAL at 09:32

## 2021-03-21 RX ADMIN — LEVETIRACETAM 1000 MG: 100 INJECTION INTRAVENOUS at 05:36

## 2021-03-21 RX ADMIN — LOSARTAN POTASSIUM 100 MG: 100 TABLET, FILM COATED ORAL at 09:32

## 2021-03-21 RX ADMIN — AMLODIPINE BESYLATE 5 MG: 5 TABLET ORAL at 09:32

## 2021-03-21 RX ADMIN — NYSTATIN 500000 UNITS: 100000 SUSPENSION ORAL at 09:33

## 2021-03-21 RX ADMIN — RISPERIDONE 0.5 MG: 0.25 TABLET ORAL at 09:32

## 2021-03-21 RX ADMIN — NYSTATIN 500000 UNITS: 100000 SUSPENSION ORAL at 16:16

## 2021-03-21 NOTE — PLAN OF CARE
Problem: Skin Integrity:  Goal: Absence of new skin breakdown  Description: Absence of new skin breakdown  Outcome: Ongoing  Note: No signs of new skin breakdown noted with each assessment this shift. Skin warm, dry, and intact except where otherwise noted in head-to-toe assessment. Mucous membranes pink and moist. Assistance with turns/ambulation given as needed. Problem: Falls - Risk of:  Goal: Will remain free from falls  Description: Will remain free from falls  Outcome: Ongoing  Note: Patient remained free from falls this shift. Bed is in low position with alarm on and siderails up x2. Education given on use of call light and patient voiced understanding. Call light and beside table within reach. Arm band and falling star in place. Hourly rounds completed. Will continue to monitor. Problem: Discharge Planning:  Goal: Discharged to appropriate level of care  Description: Discharged to appropriate level of care  Outcome: Ongoing  Note: Discharge planning in process and discussed with patient/family. Social work consulted for any additional needs. Care manager aware of discharge needs. Pt plans to discharge back to [address] [place] [address] at this time. Problem: Injury - Risk of, Physical Injury:  Goal: Will remain free from falls  Description: Will remain free from falls  Outcome: Ongoing  Note: Patient remained free from falls this shift. Bed is in low position with alarm on and siderails up x2. Education given on use of call light and patient voiced understanding. Call light and beside table within reach. Arm band and falling star in place. Hourly rounds completed. Will continue to monitor. Problem: Mood - Altered:  Goal: Mood stable  Description: Mood stable  Outcome: Ongoing  Note: Pt's mood appears stable at this time.       Problem: Sleep Pattern Disturbance:  Goal: Appears well-rested  Description: Appears well-rested  Outcome: Ongoing  Note: Pt appears to be resting well at this time. Problem: Physical Regulation:  Goal: Ability to maintain a body temperature in the normal range will improve  Description: Ability to maintain a body temperature in the normal range will improve  Outcome: Ongoing  Vitals:    03/20/21 0800 03/20/21 1157 03/20/21 1545 03/20/21 1957   BP: (!) 147/72 94/61 (!) 141/66 134/79   Pulse: 82 77 75 74   Resp: 18 20 20 18   Temp: 98.4 °F (36.9 °C) 98.4 °F (36.9 °C) 98.9 °F (37.2 °C) 99.5 °F (37.5 °C)   TempSrc: Oral Axillary Oral Oral   SpO2: 98% 98% 98% 95%   Weight:       Height:           Will continue to monitor. Plan of care discussed with pt and family. Pt verbalizes understand.

## 2021-03-21 NOTE — PROGRESS NOTES
the eye (that may be temporary or permanent). I discussed with them the possible complication of shunt over drain that could case intracranial bleed that may need a surgical intervention.  -I discussed with the patient family that patient may need another such as shunt revision in the future   - All questions were answered. - The patient's family elected to proceed with the recommended surgical treatment option (as patient is not able to make her own decision because of her level of mental level of mental status). - The plan now for surgery tomorrow.   -I discussed the case with Dr. Heide Mckinney cleared the patient for surgery from infectious disease perspective). -NPO after Eric Smith MD      Neurosurgery Progress Note    Patient:  Estrellita Greenfield      Unit/Bed:4A-16/016-A    YOB: 1960    MRN: [id number]     Acct: [de-identified]     Admit date: 3/17/2021    Chief Complaint   Patient presents with    Altered Mental Status       Patient Seen, Chart, Physician notes, Labs, Radiology studies reviewed. Subjective: Patient is seen and evaluated on the floor with evaluation exam findings reviewed and discussed with Dr. Lonnie Gamboa. Patient is resting comfortably this morning with no significant changes noted the patient chart overnight. Pain is well controlled. Patient remains confused with some disorientation to her baseline. Past, Family, Social History unchanged from admission. Diet:  Diet NPO Effective Now Exceptions are: Sips of Water with Meds, Other (Specify); Specify Other: Ok for thin liquids via straw administered by RN ONLY for pleasure, OK medications WHOLE (individual-one at a time) with thin liquids  Diet NPO Time Specified Exceptions are: Sips of Water with Meds, Other (Specify);  Specify Other: In anticipation of a revision surgery with Dr. Sarah Quinn for Monday at 0800    Medications:  Scheduled Meds:   sodium chloride flush  10 mL Intravenous 2 times per day    [Held by provider] enoxaparin  40 mg Subcutaneous Q12H    amLODIPine  5 mg Oral Daily    atorvastatin  20 mg Oral Nightly    losartan  100 mg Oral Daily    risperiDONE  0.5 mg Oral BID    senna  1 tablet Oral BID    [Held by provider] levETIRAcetam  1,000 mg Oral BID    calcium replacement protocol   Other RX Placeholder    piperacillin-tazobactam  3,375 mg Intravenous Q8H    nystatin  5 mL Mouth/Throat 4x Daily    levetiracetam  1,000 mg Intravenous Q12H    Tbo-Filgrastim  480 mcg Subcutaneous QPM     Continuous Infusions:   lactated ringers 75 mL/hr at 03/20/21 2226     PRN Meds:potassium chloride **OR** potassium alternative oral replacement **OR** potassium chloride, sodium chloride flush, polyethylene glycol, acetaminophen **OR** acetaminophen, ondansetron **OR** ondansetron, simethicone, hydrALAZINE    Objective: Patient is resting in bed with a head of the bed elevated 30 degrees, comfortably. Remains stable and intact to her baseline on exam with no significant changes noted to the patient chart overnight. Patient is being placed n.p.o. at midnight in anticipation of revision surgery to be performed by Dr. Daryn Vargas Monday morning at 0800. Vitals: /64   Pulse 63   Temp 98.6 °F (37 °C) (Oral)   Resp 16   Ht 5' 6\" (1.676 m)   Wt 280 lb 6.4 oz (127.2 kg)   SpO2 95%   BMI 45.26 kg/m²   Physical Exam:  Alert and attentive. Language appropriate, with no aphasia. Pupils equal.  Facial strength symmetric. Physical Exam  Patient remained stable and grossly intact to her baseline on exam with no significant changes noted to the patient chart overnight. ROS:  Review of Systems  Comprehensive review of systems is unobtainable due to patient's baseline status.     24 hour intake/output:    Intake/Output Summary (Last 24 hours) at 3/21/2021 1051  Last data filed at 3/21/2021 0311  Gross per 24 hour   Intake 2449.07 ml   Output 3300 ml   Net -850.93 ml has been electronically signed by: Sawyer Perez MD on 03/17/2021 11:03 PM All CTs at this facility use dose modulation techniques and iterative reconstructions, and/or weight-based dosing when appropriate to reduce radiation to a low as reasonably achievable. Ct Head Wo Contrast    Result Date: 3/17/2021  PROCEDURE: CT HEAD WO CONTRAST CLINICAL INFORMATION: Altered mental status, status post  shunt COMPARISON: Outside CT examination dated 3/11/2020. TECHNIQUE:  Axial CT images were obtained through the head without contrast. Coronal and sagittal reformatted images were rendered. All CT scans at this facility use dose modulation, iterative reconstruction, and/or weight-based dosing when appropriate to reduce radiation dose to as low as reasonably achievable. FINDINGS: No acute intracranial hemorrhage or mass effect is seen. There is moderate ventriculomegaly demonstrated. The overall degree of ventriculomegaly appears slightly progressed at the level of the anterior left lateral ventricle when compared to the prior examination dated 3/11/2020. There is an anterior rightward approach ventricular peritoneal shunt catheter with the distal tip abutting the septum pellucidum on axial image 20. This is similar in position when compared to prior examination. This slightly deviates the septum pellucidum towards the left by approximately 4 mm. However, there is no evidence of midline shift at the level of the pineal gland. The basal cisterns appear within normal limits. The posterior fossa appears normal. There is subcortical white matter hypoattenuation demonstrated at the level of the left frontal lobe as seen on axial images 20/7/2029. This may represent an age-indeterminate infarct. No acute abnormalities of the calvarium are seen. There is mild mucosal thickening within the maxillary sinuses. The mastoid air cells appear well-pneumatized. 1. No acute intracranial hemorrhage or mass effect.  2. There is subcortical white matter hypoattenuation demonstrated at the level of the left frontal lobe as seen on axial images 20/7/2029. This may represent an age-indeterminate infarct. Although a remote infarct is favored given the appearance of encephalomalacia on coronal series image 22. 3. There is moderate ventriculomegaly demonstrated. The overall degree of ventriculomegaly appears slightly progressed at the level of the anterior left lateral ventricle when compared to the prior examination dated 3/11/2020. 4. There is an anterior rightward approach ventricular peritoneal shunt catheter with the distal tip abutting the septum pellucidum on axial image 20. This is similar in position when compared to prior examination. This slightly deviates the septum pellucidum towards the left by approximately 4 mm. However, there is no evidence of midline shift at the level of the pineal gland. **This report has been created using voice recognition software. It may contain minor errors which are inherent in voice recognition technology. ** Final report electronically signed by Dr. Samuel Olivera on 3/17/2021 8:34 PM    Ct Chest Wo Contrast    Result Date: 3/17/2021  CT chest without contrast. COMPARISON: CT of the abdomen and pelvis performed at the same time. FINDINGS: Visualized thyroid is unremarkable. No supraclavicular or axillary lymphadenopathy. Ascending aorta and main pulmonary artery are normal in caliber. Cardiomegaly. No pericardial effusion. Normal esophagus. Multiple normal-sized mediastinal lymph nodes, nonspecific. Upper Abdomen: Findings are reported on CT of the abdomen and pelvis performed at the same time. Lungs: No pleural effusion. Minimal consolidation along the posterior lung bases. Trachea and central airways are clear. No significant bronchial wall thickening. No bronchiectasis. Musculoskeletal: Mild spondylosis. No acute fracture. 1.  No acute intrathoracic findings.  2.  Minimal consolidation along the right lung base posteriorly, likely representing atelectasis. This document has been electronically signed by: Lorrine Brittle, MD on 03/17/2021 11:10 PM All CTs at this facility use dose modulation techniques and iterative reconstructions, and/or weight-based dosing when appropriate to reduce radiation to a low as reasonably achievable. Nm Csf Shunt Evaluation    Result Date: 3/19/2021  PROCEDURE: NM CSF SHUNT EVALUATION CLINICAL INFORMATION: Suspected ventriculoperitoneal shunt malfunction Radiopharmaceutical: 1.1 mCi technetium 99m DTPA. TECHNIQUE: Radiopharmaceutical was injected into a shunt reservoir. Immediate and delayed images of the head, neck, chest, abdomen and pelvis were obtained. At the request of Dr. Rosa Hansen, 5 mL of cerebrospinal fluid was extracted from the shunt reservoir and sent to cytology. COMPARISON: None FINDINGS: There is intense activity in the shunt reservoir and reflux of radiopharmaceutical into the ventricles. There is activity in the proximal shunt tubing overlying the neck. However, no activity is present in shunt tubing overlying the abdomen or pelvis. No radiopharmaceutical is identified in the pelvis. Absence of activity in distal shunt catheter tubing and in the pelvis, highly suspicious for occlusion. Final report electronically signed by Dr. Cintia Taveras on 3/19/2021 4:01 PM    Xr Shunt Series Placement (<4 Views)    Result Date: 3/17/2021  PROCEDURE: XR SHUNT SERIES PLACEMENT (<4 VIEWS) CLINICAL INFORMATION: Confusion, history of  shunt COMPARISON: No prior study. TECHNIQUE:  Abdomen 2 views, chest single view, skull 2 views. FINDINGS: There is a right frontal approach  shunt catheter present. The visualized portions of the catheter descending along the rightward skull and neck appear intact. The catheter then descends upon the anterior chest and also appears intact.  The catheter is then seen overlying the rightward abdomen coursing inferiorly and crossing the pelvis inferiorly terminating at the level of the left lateral abdomen. A portion of the inferior aspect of the catheter overlying the lower pelvis is excluded from the current view. However, the visualized portions of the catheter overlying the abdomen and pelvis appear grossly intact. There are mildly dilated gas-filled small bowel loops overlying the mid abdomen. Differential considerations would include a possible focal ileus or partial small bowel obstruction. Consider further characterization with CT. 1. There is a right-sided  shunt catheter. The visualized portions of the catheter. Intact. However, there is partial exclusion of the inferior aspect of the catheter overlying the lower pelvis. 2. There are mildly dilated gas-filled small bowel loops overlying the mid abdomen. Differential considerations would include a possible focal ileus or partial small bowel obstruction. Consider further characterization with CT. **This report has been created using voice recognition software. It may contain minor errors which are inherent in voice recognition technology. ** Final report electronically signed by Dr. Brian Salinas on 3/17/2021 8:39 PM    A/P: S/P patient is seen and evaluated on the floor with evaluation exam findings reviewed and discussed with Dr. Vinnie Reich and with nursing. Patient remained stable and intact her baseline on exam with no significant changes noted to the patient chart overnight. With imaging and exam studies significant for ventriculoperitoneal shunt malfunction neurosurgery feels it is reasonable to offer surgical intervention in the form of a reexploration and revision of her ventriculoperitoneal shunt and distal catheter tentatively scheduled for 8 AM Monday morning.   Neurosurgery has placed the patient n.p.o. at midnight in anticipation of tomorrow's procedure with a consent for the procedure having been processed over the telephone with the patient's brother, witnessed by nursing. Surgery recommends that the patient receive a chlorhexidine bath anticipation for tomorrow's procedure.   Neurosurgery to follow    Electronically signed by Gustvao Chaves PA-C on 3/21/2021 at 10:51 AM

## 2021-03-21 NOTE — PROGRESS NOTES
Allegra Spears 60  OCCUPATIONAL THERAPY MISSED TREATMENT NOTE  [facility] 4A  4A-16/016-A      Date: 3/21/2021  Patient Name: Megan Manning        CSN: [id number]   : 1960  (61 y.o.)  Gender: female                REASON FOR MISSED TREATMENT: patient unable to follow commands and stay awake upon OT attempt to eval and treat in a.m. Patient continues to be disorientated and unable to stay on task and shaking head, no when encouraged to sit EOB. patient is scheduled for procedure \"ventriculoperitoneal shunt malfunction neurosurgery feels it is reasonable to offer surgical intervention in the form of a reexploration and revision of her ventriculoperitoneal shunt and distal catheter tentatively scheduled for 8 AM Monday morning. \"OT to attempt when able to follow commands and benefit from treatment.

## 2021-03-21 NOTE — PROGRESS NOTES
Hospitalist Progress Note      Patient:  Carroll Pedrzoa    Unit/Bed:4A-16/016-A  YOB: 1960  MRN: [id number]   Acct: [de-identified]   PCP: Kenneth Reina MD  Date of Admission: 3/17/2021    Assessment/Plan:    1. Acute encephalopathy: Per family, she is more lethargic than baseline but she lives at [facility] and has been chronically encephalopathic.  shunt malfunction. Neurosurgery consulted. ID consulted. Imaging shows highly suspicious for occlusion - plan for surgery on Monday; NPO after midnight. ABG is WNL. 2. Sepsis, unknown etiology: Urine culture contaminant & Blood cultures NGTD. CSF showed no growth. IVF. Pt is afebrile. Continue IV ABX, Vanco stopped on 3/20. ID consulted. 3. History of ruptured brain aneurysm status post  shunt 2019: Aware. Neurosurgery consulted. Shuntogram shows high suspicious for occlusion, NPO after midnight. Neurosurgery & General Surgery consulted. 4. Neutropenia, severe: Smudge cells. Hematology consulted. WBC 0.5.   5. Pancytopenia: Hematology started Granix. WBC 0.5, 0.7. Hgb 11.6, 13.4. Plts 101, 103.   6. Elevated LFTs: LFT are elevated. No known baseline. LFTs still elevated but stable. CT A/P did not any acute issues of liver. 7. Hypokalemia: Potassium 4.5, 4.8, 3.4. Potassium Replacement Protocol. 8. History of seizures: Continue home medications   9. Essential hypertension: BP has been controlled. Continue home medications   10. Hyperlipidemia: Statin. Chief Complaint: AMS    Initial H and P:-    \"61 y.o. female with a past medical history spontaneous intraparenchymal intracranial hemorrhage due to aneurysm (s/p  shunt 2019), who presented to [facility] with altered mental status, worsening over the last week. History is obtained from chart review and speaking with the ED provider as family left earlier in the evening.   Patient does not respond to questions and mumbles to herself during exam.     Per family, patient has had worsening neurological decline since December, now unable to walk. Last Sunday, she began having AMS without any obvious signs or symptoms of infection. She was seen by her neurologist, who is located near their [place] in [place], who had some concern about her shunt malfunctioning. Patient does open her eyes to sound, but does not respond in any other way.     CBC was significant for neutropenia and repeated. WBCs were 0.4 and 0.3 on repeat. Associated with fever. Evidence of UTI. No other evidence of infection on imaging or skin exam.  Given vanc/zosyn in ED.     Imaging on arrival at [facility] showed moderate ventriculomegaly, slightly progressed compared to imaging from 3/11/2020. Some subcortical white matter hypo-attenuation demonstrated at the level of he left frontal lobe, which could represent an age-indeterminate infarct. Some fluid noted in pelvis near shunt, could benefit from paracentesis and culture. \"    3/19: No acute events overnight. Per SW who spoke with NH, nursing home was speaking to family about hospice. Pt is following commands. Pt does not give good conversation. Pt not complaining of pain. 3/20: No acute events overnight. Pt is alert but lethargic. Shuntogram showed possible occulusion. Subjective (past 24 hours):   No acute events overnight. Pt is more alert and talkative today. Pt understands that she will undergo surgery tomorrow. No issues or complaints. Past medical history, family history, social history and allergies reviewed again and is unchanged since admission.     ROS Unable to assess due to mentation     Medications:  Reviewed    Infusion Medications    lactated ringers 75 mL/hr at 03/21/21 1212     Scheduled Medications    sodium chloride flush  10 mL Intravenous 2 times per day    [Held by provider] enoxaparin  40 mg Subcutaneous Q12H    amLODIPine  5 mg Oral Daily    atorvastatin  20 mg Oral Nightly    losartan  100 mg Oral Daily    risperiDONE  0.5 mg Oral BID    senna  1 tablet Oral BID    [Held by provider] levETIRAcetam  1,000 mg Oral BID    calcium replacement protocol   Other RX Placeholder    piperacillin-tazobactam  3,375 mg Intravenous Q8H    nystatin  5 mL Mouth/Throat 4x Daily    levetiracetam  1,000 mg Intravenous Q12H    Tbo-Filgrastim  480 mcg Subcutaneous QPM     PRN Meds: potassium chloride **OR** potassium alternative oral replacement **OR** potassium chloride, sodium chloride flush, polyethylene glycol, acetaminophen **OR** acetaminophen, ondansetron **OR** ondansetron, simethicone, hydrALAZINE      Intake/Output Summary (Last 24 hours) at 3/21/2021 1243  Last data filed at 3/21/2021 1708  Gross per 24 hour   Intake 2449.07 ml   Output 3300 ml   Net -850.93 ml       Diet:  Diet NPO Effective Now Exceptions are: Sips of Water with Meds, Other (Specify); Specify Other: Ok for thin liquids via straw administered by RN ONLY for pleasure, OK medications WHOLE (individual-one at a time) with thin liquids  Diet NPO Time Specified Exceptions are: Sips of Water with Meds, Other (Specify); Specify Other: In anticipation of a revision surgery with Dr. Alyx Herman for Monday at 0800    Exam:  /84   Pulse 81   Temp 98.8 °F (37.1 °C) (Oral)   Resp 18   Ht 5' 6\" (1.676 m)   Wt 280 lb 6.4 oz (127.2 kg)   SpO2 98%   BMI 45.26 kg/m²   General appearance: Chronically ill appearing white female   HEENT: Pupils equal, round, and reactive to light. Conjunctivae/corneas clear. Neck: Supple, with full range of motion. No jugular venous distention. Trachea midline. Respiratory:  Normal respiratory effort on RA. Clear to auscultation, bilaterally without Rales/Wheezes/Rhonchi. Cardiovascular: Regular rate and rhythm with normal S1/S2 without murmurs, rubs or gallops. Abdomen: Soft, non-tender, non-distended with normal bowel sounds.   Musculoskeletal: passive and active ROM x 4 amount of fluid present within the lower abdomen/pelvis. This    is likely secondary to the ventriculoperitoneal shunt which is in close    proximity to this region. This document has been electronically signed by: Vita Chaparro MD on    03/17/2021 11:03 PM      All CTs at this facility use dose modulation techniques and iterative    reconstructions, and/or weight-based dosing   when appropriate to reduce radiation to a low as reasonably achievable. XR SHUNT SERIES PLACEMENT (<4 VIEWS)   Final Result   1. There is a right-sided  shunt catheter. The visualized portions of the catheter. Intact. However, there is partial exclusion of the inferior aspect of the catheter overlying the lower pelvis. 2. There are mildly dilated gas-filled small bowel loops overlying the mid abdomen. Differential considerations would include a possible focal ileus or partial small bowel obstruction. Consider further characterization with CT. **This report has been created using voice recognition software. It may contain minor errors which are inherent in voice recognition technology. **      Final report electronically signed by Dr. Nancy Huffman on 3/17/2021 8:39 PM      CT HEAD WO CONTRAST   Final Result   1. No acute intracranial hemorrhage or mass effect. 2. There is subcortical white matter hypoattenuation demonstrated at the level of the left frontal lobe as seen on axial images 20/7/2029. This may represent an age-indeterminate infarct. Although a remote infarct is favored given the appearance of    encephalomalacia on coronal series image 22.      3. There is moderate ventriculomegaly demonstrated. The overall degree of ventriculomegaly appears slightly progressed at the level of the anterior left lateral ventricle when compared to the prior examination dated 3/11/2020.      4.  There is an anterior rightward approach ventricular peritoneal shunt catheter with the distal tip abutting the septum pellucidum on axial image 20. This is similar in position when compared to prior examination. This slightly deviates the septum    pellucidum towards the left by approximately 4 mm. However, there is no evidence of midline shift at the level of the pineal gland. **This report has been created using voice recognition software. It may contain minor errors which are inherent in voice recognition technology. **      Final report electronically signed by Dr. Thierry Webb on 3/17/2021 8:34 PM        Electronically signed by KRIS Irizarry on 3/21/2021 at 12:43 PM

## 2021-03-22 ENCOUNTER — ANESTHESIA (OUTPATIENT)
Dept: OPERATING ROOM | Age: 61
DRG: 022 | End: 2021-03-22
Payer: MEDICAID

## 2021-03-22 VITALS — DIASTOLIC BLOOD PRESSURE: 72 MMHG | SYSTOLIC BLOOD PRESSURE: 145 MMHG | OXYGEN SATURATION: 99 % | TEMPERATURE: 97.2 F

## 2021-03-22 LAB
ATYPICAL LYMPHOCYTES: ABNORMAL %
BASOPHILS # BLD: 0.8 %
BASOPHILS ABSOLUTE: 0 THOU/MM3 (ref 0–0.1)
EOSINOPHIL # BLD: 0 %
EOSINOPHILS ABSOLUTE: 0 THOU/MM3 (ref 0–0.4)
ERYTHROCYTE [DISTWIDTH] IN BLOOD BY AUTOMATED COUNT: 14.9 % (ref 11.5–14.5)
ERYTHROCYTE [DISTWIDTH] IN BLOOD BY AUTOMATED COUNT: 55.3 FL (ref 35–45)
HCT VFR BLD CALC: 35.5 % (ref 37–47)
HEMOGLOBIN: 11.2 GM/DL (ref 12–16)
IMMATURE GRANS (ABS): 0.01 THOU/MM3 (ref 0–0.07)
IMMATURE GRANULOCYTES: 0.8 %
LYMPHOCYTES # BLD: 47.2 %
LYMPHOCYTES ABSOLUTE: 0.6 THOU/MM3 (ref 1–4.8)
MCH RBC QN AUTO: 31.9 PG (ref 26–33)
MCHC RBC AUTO-ENTMCNC: 31.5 GM/DL (ref 32.2–35.5)
MCV RBC AUTO: 101.1 FL (ref 81–99)
MONOCYTES # BLD: 43.1 %
MONOCYTES ABSOLUTE: 0.5 THOU/MM3 (ref 0.4–1.3)
NUCLEATED RED BLOOD CELLS: 0 /100 WBC
PLATELET # BLD: 89 THOU/MM3 (ref 130–400)
PLATELET ESTIMATE: ABNORMAL
PMV BLD AUTO: 12.4 FL (ref 9.4–12.4)
RBC # BLD: 3.51 MILL/MM3 (ref 4.2–5.4)
SCAN OF BLOOD SMEAR: NORMAL
SEG NEUTROPHILS: 8.1 %
SEGMENTED NEUTROPHILS ABSOLUTE COUNT: 0.1 THOU/MM3 (ref 1.8–7.7)
WBC # BLD: 1.2 THOU/MM3 (ref 4.8–10.8)

## 2021-03-22 PROCEDURE — 3600000004 HC SURGERY LEVEL 4 BASE: Performed by: NEUROLOGICAL SURGERY

## 2021-03-22 PROCEDURE — 85025 COMPLETE CBC W/AUTO DIFF WBC: CPT

## 2021-03-22 PROCEDURE — 6360000002 HC RX W HCPCS: Performed by: PHYSICIAN ASSISTANT

## 2021-03-22 PROCEDURE — C1729 CATH, DRAINAGE: HCPCS | Performed by: NEUROLOGICAL SURGERY

## 2021-03-22 PROCEDURE — 0JWS0JZ REVISION OF SYNTHETIC SUBSTITUTE IN HEAD AND NECK SUBCUTANEOUS TISSUE AND FASCIA, OPEN APPROACH: ICD-10-PCS | Performed by: NEUROLOGICAL SURGERY

## 2021-03-22 PROCEDURE — 3600000014 HC SURGERY LEVEL 4 ADDTL 15MIN: Performed by: NEUROLOGICAL SURGERY

## 2021-03-22 PROCEDURE — 7100000000 HC PACU RECOVERY - FIRST 15 MIN: Performed by: NEUROLOGICAL SURGERY

## 2021-03-22 PROCEDURE — 2709999900 HC NON-CHARGEABLE SUPPLY: Performed by: NEUROLOGICAL SURGERY

## 2021-03-22 PROCEDURE — 99252 IP/OBS CONSLTJ NEW/EST SF 35: CPT | Performed by: NURSE PRACTITIONER

## 2021-03-22 PROCEDURE — 99232 SBSQ HOSP IP/OBS MODERATE 35: CPT | Performed by: PHYSICIAN ASSISTANT

## 2021-03-22 PROCEDURE — 6370000000 HC RX 637 (ALT 250 FOR IP): Performed by: PHYSICIAN ASSISTANT

## 2021-03-22 PROCEDURE — 6360000002 HC RX W HCPCS: Performed by: STUDENT IN AN ORGANIZED HEALTH CARE EDUCATION/TRAINING PROGRAM

## 2021-03-22 PROCEDURE — 62258 REPLACE BRAIN CAVITY SHUNT: CPT | Performed by: PHYSICIAN ASSISTANT

## 2021-03-22 PROCEDURE — 2580000003 HC RX 258: Performed by: PHYSICIAN ASSISTANT

## 2021-03-22 PROCEDURE — 2580000003 HC RX 258

## 2021-03-22 PROCEDURE — 3700000001 HC ADD 15 MINUTES (ANESTHESIA): Performed by: NEUROLOGICAL SURGERY

## 2021-03-22 PROCEDURE — 7100000001 HC PACU RECOVERY - ADDTL 15 MIN: Performed by: NEUROLOGICAL SURGERY

## 2021-03-22 PROCEDURE — 00163J6 BYPASS CEREBRAL VENTRICLE TO PERITONEAL CAVITY WITH SYNTHETIC SUBSTITUTE, PERCUTANEOUS APPROACH: ICD-10-PCS | Performed by: SURGERY

## 2021-03-22 PROCEDURE — 6370000000 HC RX 637 (ALT 250 FOR IP): Performed by: NEUROLOGICAL SURGERY

## 2021-03-22 PROCEDURE — 2580000003 HC RX 258: Performed by: STUDENT IN AN ORGANIZED HEALTH CARE EDUCATION/TRAINING PROGRAM

## 2021-03-22 PROCEDURE — 2720000010 HC SURG SUPPLY STERILE: Performed by: NEUROLOGICAL SURGERY

## 2021-03-22 PROCEDURE — 2780000010 HC IMPLANT OTHER: Performed by: NEUROLOGICAL SURGERY

## 2021-03-22 PROCEDURE — 3700000000 HC ANESTHESIA ATTENDED CARE: Performed by: NEUROLOGICAL SURGERY

## 2021-03-22 PROCEDURE — 92526 ORAL FUNCTION THERAPY: CPT

## 2021-03-22 PROCEDURE — 36415 COLL VENOUS BLD VENIPUNCTURE: CPT

## 2021-03-22 PROCEDURE — C1894 INTRO/SHEATH, NON-LASER: HCPCS | Performed by: NEUROLOGICAL SURGERY

## 2021-03-22 PROCEDURE — 2060000000 HC ICU INTERMEDIATE R&B

## 2021-03-22 PROCEDURE — 2500000003 HC RX 250 WO HCPCS

## 2021-03-22 PROCEDURE — 6360000002 HC RX W HCPCS

## 2021-03-22 PROCEDURE — 0WWG4JZ REVISION OF SYNTHETIC SUBSTITUTE IN PERITONEAL CAVITY, PERCUTANEOUS ENDOSCOPIC APPROACH: ICD-10-PCS | Performed by: NEUROLOGICAL SURGERY

## 2021-03-22 PROCEDURE — 62258 REPLACE BRAIN CAVITY SHUNT: CPT | Performed by: NEUROLOGICAL SURGERY

## 2021-03-22 RX ORDER — ONDANSETRON 2 MG/ML
4 INJECTION INTRAMUSCULAR; INTRAVENOUS EVERY 6 HOURS PRN
Status: DISCONTINUED | OUTPATIENT
Start: 2021-03-22 | End: 2021-03-29 | Stop reason: HOSPADM

## 2021-03-22 RX ORDER — GLYCOPYRROLATE 1 MG/5 ML
SYRINGE (ML) INTRAVENOUS PRN
Status: DISCONTINUED | OUTPATIENT
Start: 2021-03-22 | End: 2021-03-22 | Stop reason: SDUPTHER

## 2021-03-22 RX ORDER — GINSENG 100 MG
CAPSULE ORAL PRN
Status: DISCONTINUED | OUTPATIENT
Start: 2021-03-22 | End: 2021-03-22 | Stop reason: ALTCHOICE

## 2021-03-22 RX ORDER — SODIUM CHLORIDE 0.9 % (FLUSH) 0.9 %
10 SYRINGE (ML) INJECTION EVERY 12 HOURS SCHEDULED
Status: DISCONTINUED | OUTPATIENT
Start: 2021-03-22 | End: 2021-03-23

## 2021-03-22 RX ORDER — EPHEDRINE SULFATE/0.9% NACL/PF 50 MG/5 ML
SYRINGE (ML) INTRAVENOUS PRN
Status: DISCONTINUED | OUTPATIENT
Start: 2021-03-22 | End: 2021-03-22 | Stop reason: SDUPTHER

## 2021-03-22 RX ORDER — SODIUM CHLORIDE 0.9 % (FLUSH) 0.9 %
10 SYRINGE (ML) INJECTION PRN
Status: DISCONTINUED | OUTPATIENT
Start: 2021-03-22 | End: 2021-03-23

## 2021-03-22 RX ORDER — HYDROCODONE BITARTRATE AND ACETAMINOPHEN 5; 325 MG/1; MG/1
1 TABLET ORAL EVERY 6 HOURS PRN
Status: DISCONTINUED | OUTPATIENT
Start: 2021-03-22 | End: 2021-03-29 | Stop reason: HOSPADM

## 2021-03-22 RX ORDER — ROCURONIUM BROMIDE 10 MG/ML
INJECTION, SOLUTION INTRAVENOUS PRN
Status: DISCONTINUED | OUTPATIENT
Start: 2021-03-22 | End: 2021-03-22 | Stop reason: SDUPTHER

## 2021-03-22 RX ORDER — PROMETHAZINE HYDROCHLORIDE 25 MG/1
12.5 TABLET ORAL EVERY 6 HOURS PRN
Status: DISCONTINUED | OUTPATIENT
Start: 2021-03-22 | End: 2021-03-29 | Stop reason: HOSPADM

## 2021-03-22 RX ORDER — PHENYLEPHRINE HYDROCHLORIDE 10 MG/ML
INJECTION INTRAVENOUS PRN
Status: DISCONTINUED | OUTPATIENT
Start: 2021-03-22 | End: 2021-03-22 | Stop reason: SDUPTHER

## 2021-03-22 RX ORDER — SODIUM CHLORIDE 9 MG/ML
INJECTION, SOLUTION INTRAVENOUS CONTINUOUS PRN
Status: DISCONTINUED | OUTPATIENT
Start: 2021-03-22 | End: 2021-03-22 | Stop reason: SDUPTHER

## 2021-03-22 RX ORDER — LIDOCAINE HCL/PF 100 MG/5ML
SYRINGE (ML) INJECTION PRN
Status: DISCONTINUED | OUTPATIENT
Start: 2021-03-22 | End: 2021-03-22 | Stop reason: SDUPTHER

## 2021-03-22 RX ORDER — PROPOFOL 10 MG/ML
INJECTION, EMULSION INTRAVENOUS PRN
Status: DISCONTINUED | OUTPATIENT
Start: 2021-03-22 | End: 2021-03-22 | Stop reason: SDUPTHER

## 2021-03-22 RX ORDER — ACETAMINOPHEN 325 MG/1
650 TABLET ORAL EVERY 4 HOURS PRN
Status: DISCONTINUED | OUTPATIENT
Start: 2021-03-22 | End: 2021-03-29 | Stop reason: HOSPADM

## 2021-03-22 RX ORDER — FENTANYL CITRATE 50 UG/ML
INJECTION, SOLUTION INTRAMUSCULAR; INTRAVENOUS PRN
Status: DISCONTINUED | OUTPATIENT
Start: 2021-03-22 | End: 2021-03-22 | Stop reason: SDUPTHER

## 2021-03-22 RX ORDER — SODIUM CHLORIDE 9 MG/ML
INJECTION, SOLUTION INTRAVENOUS CONTINUOUS
Status: DISCONTINUED | OUTPATIENT
Start: 2021-03-22 | End: 2021-03-24

## 2021-03-22 RX ORDER — DEXAMETHASONE SODIUM PHOSPHATE 10 MG/ML
INJECTION, EMULSION INTRAMUSCULAR; INTRAVENOUS PRN
Status: DISCONTINUED | OUTPATIENT
Start: 2021-03-22 | End: 2021-03-22 | Stop reason: SDUPTHER

## 2021-03-22 RX ORDER — ONDANSETRON 2 MG/ML
INJECTION INTRAMUSCULAR; INTRAVENOUS PRN
Status: DISCONTINUED | OUTPATIENT
Start: 2021-03-22 | End: 2021-03-22 | Stop reason: SDUPTHER

## 2021-03-22 RX ADMIN — Medication 10 MG: at 08:38

## 2021-03-22 RX ADMIN — PHENYLEPHRINE HYDROCHLORIDE 100 MCG: 10 INJECTION INTRAVENOUS at 09:54

## 2021-03-22 RX ADMIN — FENTANYL CITRATE 50 MCG: 50 INJECTION INTRAMUSCULAR; INTRAVENOUS at 09:17

## 2021-03-22 RX ADMIN — Medication 0.4 MG: at 08:37

## 2021-03-22 RX ADMIN — NYSTATIN 500000 UNITS: 100000 SUSPENSION ORAL at 17:32

## 2021-03-22 RX ADMIN — ROCURONIUM BROMIDE 50 MG: 10 INJECTION INTRAVENOUS at 08:43

## 2021-03-22 RX ADMIN — PIPERACILLIN AND TAZOBACTAM 3375 MG: 3; .375 INJECTION, POWDER, LYOPHILIZED, FOR SOLUTION INTRAVENOUS at 11:11

## 2021-03-22 RX ADMIN — PHENYLEPHRINE HYDROCHLORIDE 100 MCG: 10 INJECTION INTRAVENOUS at 08:17

## 2021-03-22 RX ADMIN — PROPOFOL 150 MG: 10 INJECTION, EMULSION INTRAVENOUS at 08:02

## 2021-03-22 RX ADMIN — LOSARTAN POTASSIUM 100 MG: 100 TABLET, FILM COATED ORAL at 12:52

## 2021-03-22 RX ADMIN — DEXAMETHASONE SODIUM PHOSPHATE 10 MG: 10 INJECTION, EMULSION INTRAMUSCULAR; INTRAVENOUS at 08:27

## 2021-03-22 RX ADMIN — SODIUM CHLORIDE: 9 INJECTION, SOLUTION INTRAVENOUS at 17:23

## 2021-03-22 RX ADMIN — ROCURONIUM BROMIDE 20 MG: 10 INJECTION INTRAVENOUS at 09:48

## 2021-03-22 RX ADMIN — PHENYLEPHRINE HYDROCHLORIDE 200 MCG: 10 INJECTION INTRAVENOUS at 08:21

## 2021-03-22 RX ADMIN — TBO-FILGRASTIM 480 MCG: 480 INJECTION, SOLUTION SUBCUTANEOUS at 17:32

## 2021-03-22 RX ADMIN — Medication 10 MG: at 09:10

## 2021-03-22 RX ADMIN — Medication 10 MG: at 08:53

## 2021-03-22 RX ADMIN — SODIUM CHLORIDE: 9 INJECTION, SOLUTION INTRAVENOUS at 07:53

## 2021-03-22 RX ADMIN — SENNOSIDES 8.6 MG: 8.6 TABLET, FILM COATED ORAL at 20:35

## 2021-03-22 RX ADMIN — RISPERIDONE 0.5 MG: 0.25 TABLET ORAL at 12:52

## 2021-03-22 RX ADMIN — HYDROCODONE BITARTRATE AND ACETAMINOPHEN 1 TABLET: 5; 325 TABLET ORAL at 12:52

## 2021-03-22 RX ADMIN — ONDANSETRON HYDROCHLORIDE 4 MG: 4 INJECTION, SOLUTION INTRAMUSCULAR; INTRAVENOUS at 10:28

## 2021-03-22 RX ADMIN — Medication 10 MG: at 09:30

## 2021-03-22 RX ADMIN — SUGAMMADEX 200 MG: 100 INJECTION, SOLUTION INTRAVENOUS at 10:05

## 2021-03-22 RX ADMIN — PIPERACILLIN AND TAZOBACTAM 3375 MG: 3; .375 INJECTION, POWDER, LYOPHILIZED, FOR SOLUTION INTRAVENOUS at 20:36

## 2021-03-22 RX ADMIN — ATORVASTATIN CALCIUM 20 MG: 20 TABLET, FILM COATED ORAL at 20:35

## 2021-03-22 RX ADMIN — PHENYLEPHRINE HYDROCHLORIDE 200 MCG: 10 INJECTION INTRAVENOUS at 08:14

## 2021-03-22 RX ADMIN — AMLODIPINE BESYLATE 5 MG: 5 TABLET ORAL at 12:52

## 2021-03-22 RX ADMIN — FENTANYL CITRATE 50 MCG: 50 INJECTION INTRAMUSCULAR; INTRAVENOUS at 10:11

## 2021-03-22 RX ADMIN — RISPERIDONE 0.5 MG: 0.25 TABLET ORAL at 20:35

## 2021-03-22 RX ADMIN — PIPERACILLIN AND TAZOBACTAM 3375 MG: 3; .375 INJECTION, POWDER, LYOPHILIZED, FOR SOLUTION INTRAVENOUS at 03:45

## 2021-03-22 RX ADMIN — FENTANYL CITRATE 100 MCG: 50 INJECTION INTRAMUSCULAR; INTRAVENOUS at 08:02

## 2021-03-22 RX ADMIN — ROCURONIUM BROMIDE 50 MG: 10 INJECTION INTRAVENOUS at 08:03

## 2021-03-22 RX ADMIN — Medication 100 MG: at 08:02

## 2021-03-22 RX ADMIN — LEVETIRACETAM 1000 MG: 100 INJECTION INTRAVENOUS at 17:32

## 2021-03-22 RX ADMIN — PHENYLEPHRINE HYDROCHLORIDE 50 MCG: 10 INJECTION INTRAVENOUS at 09:33

## 2021-03-22 RX ADMIN — PHENYLEPHRINE HYDROCHLORIDE 100 MCG: 10 INJECTION INTRAVENOUS at 08:56

## 2021-03-22 RX ADMIN — Medication 10 MG: at 08:51

## 2021-03-22 RX ADMIN — SODIUM CHLORIDE, POTASSIUM CHLORIDE, SODIUM LACTATE AND CALCIUM CHLORIDE: 600; 310; 30; 20 INJECTION, SOLUTION INTRAVENOUS at 01:42

## 2021-03-22 ASSESSMENT — PULMONARY FUNCTION TESTS
PIF_VALUE: 1
PIF_VALUE: 21
PIF_VALUE: 27
PIF_VALUE: 2
PIF_VALUE: 21
PIF_VALUE: 17
PIF_VALUE: 21
PIF_VALUE: 20
PIF_VALUE: 21
PIF_VALUE: 21
PIF_VALUE: 17
PIF_VALUE: 20
PIF_VALUE: 21
PIF_VALUE: 3
PIF_VALUE: 25
PIF_VALUE: 21
PIF_VALUE: 17
PIF_VALUE: 28
PIF_VALUE: 0
PIF_VALUE: 21
PIF_VALUE: 35
PIF_VALUE: 2
PIF_VALUE: 0
PIF_VALUE: 0
PIF_VALUE: 17
PIF_VALUE: 21
PIF_VALUE: 26
PIF_VALUE: 22
PIF_VALUE: 24
PIF_VALUE: 21
PIF_VALUE: 22
PIF_VALUE: 22
PIF_VALUE: 17
PIF_VALUE: 21
PIF_VALUE: 21
PIF_VALUE: 13
PIF_VALUE: 22
PIF_VALUE: 21
PIF_VALUE: 27
PIF_VALUE: 17
PIF_VALUE: 21
PIF_VALUE: 22
PIF_VALUE: 22
PIF_VALUE: 28
PIF_VALUE: 0
PIF_VALUE: 22
PIF_VALUE: 20
PIF_VALUE: 22
PIF_VALUE: 22
PIF_VALUE: 20
PIF_VALUE: 22
PIF_VALUE: 11
PIF_VALUE: 21
PIF_VALUE: 1
PIF_VALUE: 21
PIF_VALUE: 22
PIF_VALUE: 20
PIF_VALUE: 17
PIF_VALUE: 22
PIF_VALUE: 21
PIF_VALUE: 24
PIF_VALUE: 21
PIF_VALUE: 21
PIF_VALUE: 20
PIF_VALUE: 21
PIF_VALUE: 22
PIF_VALUE: 21
PIF_VALUE: 21
PIF_VALUE: 11
PIF_VALUE: 2
PIF_VALUE: 22
PIF_VALUE: 21
PIF_VALUE: 24
PIF_VALUE: 22
PIF_VALUE: 17
PIF_VALUE: 21
PIF_VALUE: 0

## 2021-03-22 ASSESSMENT — PAIN SCALES - GENERAL
PAINLEVEL_OUTOF10: 0
PAINLEVEL_OUTOF10: 0

## 2021-03-22 ASSESSMENT — PAIN SCALES - WONG BAKER: WONGBAKER_NUMERICALRESPONSE: 0

## 2021-03-22 NOTE — OP NOTE
[facility]  RECORD OF OPERATION    Patient name: Lion Melendrez  Medical Record Number: [id number]  Account Number: [de-identified]      Date of Procedure: 3/22/2021    Pre-operative Diagnosis:  shunt mal-function  Post-operative Diagnosis:  The same      PROCEDURE:   · Removal of old/ malfunctioned   shunt system. · Replacement of ( proximal) right intraventricular catheter. · Replacement of old malfunction  shunt with new ProGAV 2.0 (  With  Setting at 15 CM) . Anesthesia: General endotracheal     Surgeons:  Asia Steward MD ( Neurosurgery). Dr. Pascale Gutierrez [facility] surgery). Assistant: Sugar Sim PA-C     Estimated Blood Loss: Minimal     Drains: None     Blood Transfusions: None      Complications: none immediately appreciated     Specimens:  None     Indications for Procedure: This is a 48year old female who was brought to the ER of [facility] today with chief complaint of headache and  episodes visual of symptoms that's been going on for the last 5 days. She had episodes of similar symptoms over the last several months. But her symptoms are currently little worse than a month ago. Patient has a history of spina bifida and had surgery at birth and at age 16 was diagnosed to have hydrocephalus and had  shunt and two revisions in the past. The last one was in 2009. She underwent imaging studies as follow:      · Patient had nuclear medicine  shunt study that showed results consistent paten distal catheter. · Patient brain CT showed an increase in the ventricular system computing with the CT that has been performed in December 2018.       - Giving the overall clinical history of this this patient and her current symptoms, as well as, the results of her imaging studies ( brain CTs, Abdomen CT, shunt series and NM shunt study), I recommended for patient a surgical intervention in form of shunt revision  with main goal is to replace the valve the hunt system, we asked Dr. Pascale Gutierrez  from our General Surgery Team to come and to insufflate the  abdomen and to enter the abdomen using the laparoscope (Please read Dr. Pascale Gutierrez operative note regarding his part of this procedure). Flowing that, we used a trocar to enter the abdomen cavity under the laparoscope vision; then we  passed the distal end of the  catheter under the vision of the laparoscopy. As we were satisfied  with placement of the  shunt catheter and the functionality of the shunt system,  we proceeded with doing copious irrigation and we did hemostasis for any  bleed from any wound incisions. Next, we closed all the wound incisions in  the standard fashion. At the end of the surgery, the patient was extubated  and transferred to PACU for further observation and treatment. The patient  tolerated the surgery very well without intraoperative complications.      Asia Steward MD  Electronically signed by me on 3/22/2021 at 12:43 PM

## 2021-03-22 NOTE — PROGRESS NOTES
Progress note: Infectious diseases    Patient - Annette Sultana,  Age - 61 y.o.    - 1960      Room Number - 4A-16/016-A   MRN -  [id number]   Acct # - [de-identified]  Date of Admission -  3/17/2021  6:41 PM    SUBJECTIVE:   She had revision of the  shunt. Surgery was uneventful  Fluid so far negative. She is more awake,    OBJECTIVE   VITALS    height is 5' 6\" (1.676 m) and weight is 280 lb 6.4 oz (127.2 kg). Her oral temperature is 98.2 °F (36.8 °C). Her blood pressure is 146/70 (abnormal) and her pulse is 76. Her respiration is 20 and oxygen saturation is 97%.        Wt Readings from Last 3 Encounters:   21 280 lb 6.4 oz (127.2 kg)   20 (!) 331 lb 9.6 oz (150.4 kg)       I/O (24 Hours)    Intake/Output Summary (Last 24 hours) at 3/22/2021 1729  Last data filed at 3/22/2021 1340  Gross per 24 hour   Intake 1938.92 ml   Output 1025 ml   Net 913.92 ml       General Appearance : dressed scalp (post surgery),more awake  HEENT - normocephalic, atraumatic, pink conjunctiva,  anicteric sclera  Neck - Supple, no mass  Lungs -  Bilateral   air entry, no rhonchi, no wheeze  Cardiovascular - Heart sounds are normal.    Abdomen - soft, not distended, nontender,   Neurologic -awake, able to make conversation  Skin - diffuse macular rash, dry skin and scaly  Extremities - chronic leg edema    MEDICATIONS:      sodium chloride flush  10 mL Intravenous 2 times per day    [Held by provider] enoxaparin  40 mg Subcutaneous Q12H    amLODIPine  5 mg Oral Daily    atorvastatin  20 mg Oral Nightly    losartan  100 mg Oral Daily    risperiDONE  0.5 mg Oral BID    senna  1 tablet Oral BID    [Held by provider] levETIRAcetam  1,000 mg Oral BID    calcium replacement protocol   Other RX Placeholder    piperacillin-tazobactam  3,375 mg Intravenous Q8H    nystatin  5 mL Mouth/Throat 4x Daily    levetiracetam  1,000 mg Intravenous Q12H    Tbo-Filgrastim  480 mcg Subcutaneous QPM      sodium chloride 75 mL/hr at 03/22/21 1723    lactated ringers 75 mL/hr at 03/22/21 0142     sodium chloride flush, promethazine **OR** ondansetron, HYDROcodone 5 mg - acetaminophen, HYDROmorphone, potassium chloride **OR** potassium alternative oral replacement **OR** potassium chloride, polyethylene glycol, simethicone, hydrALAZINE      LABS:     CBC:   Recent Labs     03/20/21  0348 03/21/21  0617 03/22/21  0346   WBC 0.5* 0.7* 1.2*   HGB 11.6* 13.4 11.2*   * 103* 89*     BMP:    Recent Labs     03/20/21 0348 03/21/21  0617   NA  --  138   K  --  3.4*   CL  --  104   CO2  --  26   BUN  --  5*   CREATININE 0.5 0.7   GLUCOSE  --  100     Calcium:  Recent Labs     03/21/21  0617   CALCIUM 9.9     Ionized Calcium:No results for input(s): IONCA in the last 72 hours. Magnesium:  Recent Labs     03/21/21  0617   MG 2.0    INR:   No results for input(s): INR in the last 72 hours. Hepatic:   Recent Labs     03/21/21  0617   ALKPHOS 297*   ALT 76*   *   PROT 7.0   BILITOT 1.0   LABALBU 2.3*        CULTURES:   UA:   No results for input(s): SPECGRAV, PHUR, COLORU, CLARITYU, MUCUS, PROTEINU, BLOODU, RBCUA, WBCUA, BACTERIA, NITRU, GLUCOSEU, BILIRUBINUR, UROBILINOGEN, KETUA, LABCAST, LABCASTTY, AMORPHOS in the last 72 hours.     Invalid input(s): CRYSTALS  Micro:   Lab Results   Component Value Date    BC No growth-preliminary  03/17/2021          Problem list of patient:     Patient Active Problem List   Diagnosis Code    Neutropenic fever ([facility] [address] D70.9, R50.81    Septicemia ([facility] [address] A41.9    Altered mental status R41.82         ASSESSMENT/PLAN   Change in mental state: better after the shunt was revised  Hx of VPShunt: non functioning : a new placed   Neutropenia with fever:improving    Judge Michael MD, FACP 3/22/2021 5:29 PM

## 2021-03-22 NOTE — PROGRESS NOTES
1035- Pt arrived in the PACU  1040- Pt is resting easy  1055- Pt weaned to 5L NC  1110- Report called to Ashish BENNETT all question answered, Pt placed on 2L and is resting easy  1120- Pt transport arrived to take the pt to 4A16

## 2021-03-22 NOTE — PROGRESS NOTES
Comprehensive Nutrition Assessment    Type and Reason for Visit:  Initial(LOS (NPO for 5 days))    Nutrition Recommendations/Plan:   Recommend diet vs NPO per SLP. If requires ongoing NPO status - consider initiate TF. Recommend Jevity 1.5 at 25 ml/hr, increase by 15 ml every 4 hours, as tolerated, to goal of 50 ml/hr with 220 ml free water flush every 6 hours (if no IVF). Nutrition Assessment:    Pt. nutritionally compromised AEB NPO for past 5 days. At risk for further nutrition compromise r/t shunt malfunction, impaired mental status and underlying medical condition (hx brain aneurysm, seizures, chronic encephalopathy). Nutrition recommendations/interventions as per above. Malnutrition Assessment:  Malnutrition Status:  Insufficient data    Context:  Acute Illness     Findings of the 6 clinical characteristics of malnutrition:  Energy Intake:  7 - 50% or less of estimated energy requirements for 5 or more days  Weight Loss:  Unable to assess     Body Fat Loss:  Unable to assess     Muscle Mass Loss:  Unable to assess    Fluid Accumulation:  Unable to assess     Strength:  Not Performed    Estimated Daily Nutrient Needs:  Energy (kcal):  7406-4850 kcals (11-15); Weight Used for Energy Requirements:  (127 kgm 3/21)     Protein (g):  118+ grams (2+/kgm); Weight Used for Protein Requirements:  Ideal(59 kgm)        Fluid (ml/day):  ~1770 ml (30/kgm); Method Used for Fluid Requirements:  Other (Comment)(59 kgm)      Nutrition Related Findings:     Attempted to see pt - off unit (surgery for shunt malfunction); Rx includes Keppra, Nystatin, ATB, Glycolax, Zofran; 3/21: Glucose 100, Sodium 138, Potassium 3.4; SLP following - per eval 3/20 - recommend NPO; from ECF    Wounds:  (Stage II(buttocks))       Current Nutrition Therapies:    Diet NPO Time Specified Exceptions are: Sips of Water with Meds, Other (Specify);  Specify Other: In anticipation of a revision surgery with Dr. Brenda Batista for

## 2021-03-22 NOTE — PLAN OF CARE
Problem: Nutrition  Goal: Optimal nutrition therapy  Outcome: Ongoing  Nutrition Problem #1: Inadequate oral intake  Intervention: Food and/or Nutrient Delivery: Continue NPO  Nutritional Goals: Pt. will receive adequate nutrition (FL diet vs TF) in next 1-2 days.

## 2021-03-22 NOTE — FLOWSHEET NOTE
03/18/21 0118   Encounter Summary   Services provided to: Patient   Referral/Consult From: Rounding   Support System Unknown   Continue Visiting Yes  (3/22)   Complexity of Encounter Low   Length of Encounter 15 minutes   Advance Directives (For Healthcare)   Healthcare Directive Unknown, patient unable to respond due to medical condition   Values / Beliefs   Do you have any ethnic, cultural, sacramental, or spiritual Quaker needs you would like us to be aware of while you are in the hospital? No   Patient was in bed and welcomed prayer, however patient could not speak.

## 2021-03-22 NOTE — CONSULTS
[facility] Consult Note  Dr. Cristine Mora MD FACS    Pt Name: Melchor Covarrubias  MRN: [id number]  YOB: 1960  Date of evaluation: 3/22/2021  Primary Care Physician: Son Palma MD  Patient evaluated at the request of  Dr. Katarzyna Gerardo   Reason for evaluation:  shunt malfunction   IMPRESSIONS:   1.  shunt malfunction   2. Acute encephalopathy  3. Sepsis of unknown etiology  4. Severe neutropenia   5. History of seizures  6. 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 PLANS:   1. Shuntogram shows possible occlusion  2. Neurosurgery on board  3. On zosyn. ID following. 4. Labs reviewed  5. Assisting with  shunt revision this morning. Discussed with patient and family who agree to proceed with surgery. SUBJECTIVE:   Chief complaint: AMS    History of present illness:  Nelli Funez is a 64-year old female patient who was brought to [address] [place] [address] on 8/18/20 for alerted mental status. Patient lives in a nursing home. Her cousin and brother are at bedside. Patient is alert to name and does respond to some questions appropriately but otherwise disoriented. History of intracranial hemorrhage in 2-19 secondary to an aneurysm and is status post  shunt. Patient has had a neurological decline since December and family states she is now unable to walk or feed her self. She has dysphagia and incontinence. She also has tearful/anxiety episodes which they state are worse. She was seen by her neurologist prior to coming to the hospital and was concerned about shunt malfunction. CT on arrival to [facility] showed moderate ventriculomegaly, slightly progressed compared to imaging from 3/11/2020. Some subcortical white matter hypo-attenuation demonstrated at the level of he left frontal lobe, which could represent an age-indeterminate infarct.  She is admitted to hospitalist service and we were asked by neurosurgery to assist with revision of shunt today. Past Medical History:      Diagnosis Date    Brain aneurysm     Hypertension     Seizures ([facility] [address]      Past Surgical History:      Procedure Laterality Date     SECTION      x2    GALLBLADDER SURGERY      partial      Medications:  Prior to Admission medications    Medication Sig Start Date End Date Taking?  Authorizing Provider   risperiDONE (RISPERDAL) 0.5 MG tablet Take 0.5 mg by mouth 2 times daily    Historical Provider, MD   senna (SENOKOT) 8.6 MG tablet Take 1 tablet by mouth 2 times daily    Historical Provider, MD   amLODIPine (NORVASC) 5 MG tablet Take 5 mg by mouth daily 20   Historical Provider, MD   levETIRAcetam (KEPPRA) 500 MG tablet Take 1,000 mg by mouth 2 times daily  20   Historical Provider, MD   atorvastatin (LIPITOR) 20 MG tablet Take 20 mg by mouth nightly    Historical Provider, MD   losartan (COZAAR) 100 MG tablet Take 100 mg by mouth daily 20   Historical Provider, MD   Simethicone 80 MG TABS Take 80 mg by mouth every 8 hours as needed    Historical Provider, MD    Scheduled Meds:   sodium chloride flush  10 mL Intravenous 2 times per day    [Held by provider] enoxaparin  40 mg Subcutaneous Q12H    amLODIPine  5 mg Oral Daily    atorvastatin  20 mg Oral Nightly    losartan  100 mg Oral Daily    risperiDONE  0.5 mg Oral BID    senna  1 tablet Oral BID    [Held by provider] levETIRAcetam  1,000 mg Oral BID    calcium replacement protocol   Other RX Placeholder    piperacillin-tazobactam  3,375 mg Intravenous Q8H    nystatin  5 mL Mouth/Throat 4x Daily    levetiracetam  1,000 mg Intravenous Q12H    Tbo-Filgrastim  480 mcg Subcutaneous QPM     Continuous Infusions:   lactated ringers 75 mL/hr at 21 0142     PRN Meds:.bacitracin, potassium chloride **OR** potassium alternative oral replacement **OR** potassium chloride, sodium chloride flush, polyethylene glycol, but otherwise disoriented to situation, no acute distress. Does not follow commands. Chronically ill appearing. SKIN: Skin color, texture, turgor normal. Dry, scaly skin. LYMPH: no cervical nodes, no inguinal nodes  HEENT: Head is normocephalic, atraumatic. NECK: Supple, symmetrical, trachea midline  CHEST/LUNGS: lungs clear to auscultation without wheezes, rales or rhonchi. CARDIOVASCULAR: Heart sounds are normal.  Regular rate and rhythm without murmur, gallop or rub. Normal S1 and S2.  ABDOMEN: Obese. Normal bowel sounds. Soft, nondistended, no masses or organomegaly. Tenderness: absent. NEUROLOGIC: Lethargic. Responds to name but disoriented otherwise. Not following commands. EXTREMITIES: no cyanosis, no clubbing. Generalized edema. LABS:     Recent Labs     03/20/21  0348 03/21/21  0617 03/22/21  0346   WBC 0.5* 0.7* 1.2*   HGB 11.6* 13.4 11.2*   HCT 36.3* 41.4 35.5*   * 103* 89*   NA  --  138  --    K  --  3.4*  --    CL  --  104  --    CO2  --  26  --    BUN  --  5*  --    CREATININE 0.5 0.7  --    MG  --  2.0  --    CALCIUM  --  9.9  --    AST  --  127*  --    ALT  --  76*  --    BILITOT  --  1.0  --      RADIOLOGY:   I have personally reviewed the following films:    PROCEDURE: NM CSF SHUNT EVALUATION       CLINICAL INFORMATION: Suspected ventriculoperitoneal shunt malfunction       Radiopharmaceutical: 1.1 mCi technetium 99m DTPA.       TECHNIQUE: Radiopharmaceutical was injected into a shunt reservoir. Immediate and delayed images of the head, neck, chest, abdomen and pelvis were obtained. At the request of Dr. To Fuentes, 5 mL of cerebrospinal fluid was extracted from the shunt reservoir    and sent to cytology.       COMPARISON: None       FINDINGS: There is intense activity in the shunt reservoir and reflux of radiopharmaceutical into the ventricles. There is activity in the proximal shunt tubing overlying the neck.  However, no activity is present in shunt tubing overlying the abdomen or pelvis. No radiopharmaceutical is identified in the pelvis.           Impression       Absence of activity in distal shunt catheter tubing and in the pelvis, highly suspicious for occlusion.       Final report electronically signed by Dr. Prem Fajardo on 3/19/2021 4:01 PM     PROCEDURE: CT HEAD WO CONTRAST       CLINICAL INFORMATION: Altered mental status, status post  shunt        COMPARISON: Outside CT examination dated 3/11/2020.       TECHNIQUE:  Axial CT images were obtained through the head without contrast. Coronal and sagittal reformatted images were rendered. All CT scans at this facility use dose modulation, iterative reconstruction, and/or weight-based dosing when appropriate    to reduce radiation dose to as low as reasonably achievable.       FINDINGS:        No acute intracranial hemorrhage or mass effect is seen.        There is moderate ventriculomegaly demonstrated. The overall degree of ventriculomegaly appears slightly progressed at the level of the anterior left lateral ventricle when compared to the prior examination dated 3/11/2020.        There is an anterior rightward approach ventricular peritoneal shunt catheter with the distal tip abutting the septum pellucidum on axial image 20. This is similar in position when compared to prior examination. This slightly deviates the septum    pellucidum towards the left by approximately 4 mm. However, there is no evidence of midline shift at the level of the pineal gland.       The basal cisterns appear within normal limits. The posterior fossa appears normal.       There is subcortical white matter hypoattenuation demonstrated at the level of the left frontal lobe as seen on axial images 20/7/2029. This may represent an age-indeterminate infarct.       No acute abnormalities of the calvarium are seen. There is mild mucosal thickening within the maxillary sinuses. The mastoid air cells appear well-pneumatized.           Impression   1.  No acute spondylosis.  No acute fracture.           Impression   1.  No acute intrathoracic findings. 2.  Minimal consolidation along the right lung base posteriorly, likely    representing atelectasis.       This document has been electronically signed by: Mica Becerra MD on    03/17/2021 11:10 PM       All CTs at this facility use dose modulation techniques and iterative    reconstructions, and/or weight-based dosing   when appropriate to reduce radiation to a low as reasonably achievable. CT abdomen and pelvis without contrast.       COMPARISON: None       FINDINGS:   Lower Chest:   Cardiomegaly.  Minimal atelectasis along the posterior lung bases.       Abdomen:   Cholecystectomy.  Noncontrast appearance of the liver, spleen, pancreas    and adrenal glands are unremarkable. No renal or ureteral calculus.  No hydronephrosis.       Normal appendix.  No bowel obstruction.  shunt tubing extends into the lower abdomen/pelvis with tip in the left    mid abdomen. No mesenteric or retroperitoneal lymphadenopathy. Small amount of free fluid present within the lower abdomen. Mild aortoiliac atherosclerotic vascular calcifications.       Pelvis:   Normal appearance of the urinary bladder. No adnexal mass.       Musculoskeletal:   Mild spondylosis.  No acute fracture.           Impression   1.  No acute intra-abdominal or pelvic findings.  No bowel obstruction.     No renal obstruction. 2.  Small amount of fluid present within the lower abdomen/pelvis.  This    is likely secondary to the ventriculoperitoneal shunt which is in close    proximity to this region.       This document has been electronically signed by: Mica Becerra MD on    03/17/2021 11:03 PM       All CTs at this facility use dose modulation techniques and iterative    reconstructions, and/or weight-based dosing   when appropriate to reduce radiation to a low as reasonably achievable. Thank you for the interesting evaluation.  Further recommendations to follow. Electronically signed by NEPTALI Yuan CNP on 3/22/2021 at 11:19 AM    Patient seen and examined independently by me early this AM prior to OR. Above discussed and I agree with Jose Angel Quinones CNP. See my additional comments below for updated orders and plan. Labs, cultures, and radiographs where available were reviewed. I discussed patient concerns with the patient's nurse and instructions were given. Please see our orders for the updated patient care plan. -Patient planning  shunt revision with Dr. Ranjana Arango of the neurosurgical service. Plan to assist neurosurgical team with placement of  shunt catheter intra-abdominally. Risks, benefits and alternatives discussed with family. All questions answered. They agree and wish to proceed. Patient currently n.p.o. for surgery.     Electronically signed by Mandy Granda MD on 3/22/21 at 2:24 PM EDT

## 2021-03-22 NOTE — PROGRESS NOTES
Hospitalist Progress Note      Patient:  Melchor Ped    Unit/Bed:4A-16/016-A  YOB: 1960  MRN: [id number]   Acct: [de-identified]   PCP: Son Palma MD  Date of Admission: 3/17/2021    Assessment/Plan:    1.  Shunt Malfucntion: 2/2  shunt malfunction. POD0. Neurosurgery & General Surgery together on surgery. Dressing c/d/i. Neuro checks per protocol. 2. Acute encephalopathy: Likely 2/2  shunt issues. Will continue to monitor post-operatively. Neuro checks. SLP states that they see improvement since admission. 3. Sepsis, unknown etiology: Urine culture contaminant & Blood cultures NGTD. CSF showed no growth. IVF. Pt is afebrile. Continue IV ABX, Vanco stopped on 3/20. ID consulted. 4. History of ruptured brain aneurysm status post  shunt 2019: Aware. Neurosurgery consulted. Shuntogram shows high suspicious for occlusion, NPO after midnight. Neurosurgery & General Surgery consulted. 5. Neutropenia, severe: Smudge cells. Hematology consulted. WBC 0.5.   6. Pancytopenia: Hematology started Granix. WBC 0.5, 0.7. Hgb 11.6, 13.4. Plts 101, 103.   7. Elevated LFTs: LFT are elevated. No known baseline. LFTs still elevated but stable. CT A/P did not any acute issues of liver. 8. Hypokalemia: Potassium 4.5, 4.8, 3.4. Potassium Replacement Protocol. 9. History of seizures: Continue home medications   10. Essential hypertension: BP has been controlled. Continue home medications   11. Hyperlipidemia: Statin. Chief Complaint: AMS    Initial H and P:-    \"61 y.o. female with a past medical history spontaneous intraparenchymal intracranial hemorrhage due to aneurysm (s/p  shunt 2019), who presented to [address] [place] [address] with altered mental status, worsening over the last week. History is obtained from chart review and speaking with the ED provider as family left earlier in the evening.   Patient does not respond to questions and mumbles to herself during exam.     Per family, patient has had worsening neurological decline since December, now unable to walk. Last Sunday, she began having AMS without any obvious signs or symptoms of infection. She was seen by her neurologist, who is located near their hometown in [place], who had some concern about her shunt malfunctioning. Patient does open her eyes to sound, but does not respond in any other way.     CBC was significant for neutropenia and repeated. WBCs were 0.4 and 0.3 on repeat. Associated with fever. Evidence of UTI. No other evidence of infection on imaging or skin exam.  Given vanc/zosyn in ED.     Imaging on arrival at [facility] showed moderate ventriculomegaly, slightly progressed compared to imaging from 3/11/2020. Some subcortical white matter hypo-attenuation demonstrated at the level of he left frontal lobe, which could represent an age-indeterminate infarct. Some fluid noted in pelvis near shunt, could benefit from paracentesis and culture. \"    3/19: No acute events overnight. Per SW who spoke with NH, nursing home was speaking to family about hospice. Pt is following commands. Pt does not give good conversation. Pt not complaining of pain. 3/20: No acute events overnight. Pt is alert but lethargic. Shuntogram showed possible occulusion. 3/21: No acute events overnight. Pt is more alert and talkative today. Pt understands that she will undergo surgery tomorrow. No issues or complaints. Subjective (past 24 hours):   No acute events overnight. Pt underwent surgery this AM for  shunt replacement, pt tolerated surgery well. No issues or complaints. Pt more awake. Past medical history, family history, social history and allergies reviewed again and is unchanged since admission.     ROS Unable to assess due to mentation     Medications:  Reviewed    Infusion Medications    sodium chloride      lactated ringers 75 mL/hr at 03/22/21 0142     Scheduled Medications    sodium chloride flush  10 mL Intravenous 2 times per day    [Held by provider] enoxaparin  40 mg Subcutaneous Q12H    amLODIPine  5 mg Oral Daily    atorvastatin  20 mg Oral Nightly    losartan  100 mg Oral Daily    risperiDONE  0.5 mg Oral BID    senna  1 tablet Oral BID    [Held by provider] levETIRAcetam  1,000 mg Oral BID    calcium replacement protocol   Other RX Placeholder    piperacillin-tazobactam  3,375 mg Intravenous Q8H    nystatin  5 mL Mouth/Throat 4x Daily    levetiracetam  1,000 mg Intravenous Q12H    Tbo-Filgrastim  480 mcg Subcutaneous QPM     PRN Meds: sodium chloride flush, promethazine **OR** ondansetron, HYDROcodone 5 mg - acetaminophen, HYDROmorphone, potassium chloride **OR** potassium alternative oral replacement **OR** potassium chloride, polyethylene glycol, simethicone, hydrALAZINE      Intake/Output Summary (Last 24 hours) at 3/22/2021 1352  Last data filed at 3/22/2021 1340  Gross per 24 hour   Intake 3021.97 ml   Output 2275 ml   Net 746.97 ml       Diet:  DIET DYSPHAGIA PUREED; Low Microbial, Lactose Controlled    Exam:  BP (!) 148/70   Pulse 73   Temp 97.5 °F (36.4 °C) (Oral)   Resp 18   Ht 5' 6\" (1.676 m)   Wt 280 lb 6.4 oz (127.2 kg)   SpO2 97%   BMI 45.26 kg/m²   General appearance: Chronically ill appearing white female   HEENT: Pupils equal, round, and reactive to light. Conjunctivae/corneas clear. Head wrapped in ACE, dressing c/d/di   Neck: Supple, with full range of motion. No jugular venous distention. Trachea midline. Respiratory:  Normal respiratory effort on RA. Clear to auscultation, bilaterally without Rales/Wheezes/Rhonchi. Cardiovascular: Regular rate and rhythm with normal S1/S2 without murmurs, rubs or gallops. Abdomen: Soft, non-tender, non-distended with normal bowel sounds. Musculoskeletal: passive and active ROM x 4 extremities. Skin: Skin color, texture, turgor normal.  No rashes or lesions.   Neurologic:  Pt moving all 4 secondary to the ventriculoperitoneal shunt which is in close    proximity to this region. This document has been electronically signed by: Vita Chaparro MD on    03/17/2021 11:03 PM      All CTs at this facility use dose modulation techniques and iterative    reconstructions, and/or weight-based dosing   when appropriate to reduce radiation to a low as reasonably achievable. XR SHUNT SERIES PLACEMENT (<4 VIEWS)   Final Result   1. There is a right-sided  shunt catheter. The visualized portions of the catheter. Intact. However, there is partial exclusion of the inferior aspect of the catheter overlying the lower pelvis. 2. There are mildly dilated gas-filled small bowel loops overlying the mid abdomen. Differential considerations would include a possible focal ileus or partial small bowel obstruction. Consider further characterization with CT. **This report has been created using voice recognition software. It may contain minor errors which are inherent in voice recognition technology. **      Final report electronically signed by Dr. Nancy Huffman on 3/17/2021 8:39 PM      CT HEAD WO CONTRAST   Final Result   1. No acute intracranial hemorrhage or mass effect. 2. There is subcortical white matter hypoattenuation demonstrated at the level of the left frontal lobe as seen on axial images 20/7/2029. This may represent an age-indeterminate infarct. Although a remote infarct is favored given the appearance of    encephalomalacia on coronal series image 22.      3. There is moderate ventriculomegaly demonstrated. The overall degree of ventriculomegaly appears slightly progressed at the level of the anterior left lateral ventricle when compared to the prior examination dated 3/11/2020.      4. There is an anterior rightward approach ventricular peritoneal shunt catheter with the distal tip abutting the septum pellucidum on axial image 20.  This is similar in position when compared to prior examination. This slightly deviates the septum    pellucidum towards the left by approximately 4 mm. However, there is no evidence of midline shift at the level of the pineal gland. **This report has been created using voice recognition software. It may contain minor errors which are inherent in voice recognition technology. **      Final report electronically signed by Dr. Suhas Truong on 3/17/2021 8:34 PM      CT HEAD WO CONTRAST    (Results Pending)     Electronically signed by KRIS King on 3/22/2021 at 1:52 PM

## 2021-03-22 NOTE — PROGRESS NOTES
--  100   MG  --  2.0     PT/INR:No results for input(s): PROTIME, INR in the last 72 hours. APTT:No results for input(s): APTT in the last 72 hours. LIVER PROFILE:  Recent Labs     03/21/21  0617   *   ALT 76*   BILITOT 1.0   ALKPHOS 297*       Imaging Last 24 Hours:  No results found. Assessment//Plan           Hospital Problems           Last Modified POA    Neutropenic fever ([facility] [address] 3/17/2021 Yes    Septicemia ([facility] [address] 3/19/2021 Yes    Altered mental status 3/19/2021 Yes        Assessment:  (Neutropenia,multifactorial,on Neupogen,improving. S/p A/V revision. ).        Electronically signed by Julienne Au MD on 3/22/21 at 4:25 PM EDT

## 2021-03-22 NOTE — PLAN OF CARE
[facility]  OCCUPATIONAL THERAPY MISSED TREATMENT NOTE  [facility] OR  [facility] OR (General) POOL R*      Date: 3/22/2021  Patient Name: Lilly Omer        CSN: [id number]   : 1960  (61 y.o.)  Gender: female                REASON FOR MISSED TREATMENT: Pt malgorzataenlty in surgery for shunt revision. Will check back on 3/23/20 for new orders.

## 2021-03-22 NOTE — PROGRESS NOTES
[facility] 60  PHYSICAL THERAPY MISSED TREATMENT NOTE  [facility] NEUROSCIENCES 4A    Date: 3/22/2021  Patient Name: Alistair Escoot        MRN: [id number]   : 1960  (61 y.o.)  Gender: female   Referring Practitioner: KRIS Torres  Diagnosis: neutropenic fever         REASON FOR MISSED TREATMENT:  Pt off unit for sx, will await activity orders and have PT reassess following sx. Sancho Robin

## 2021-03-22 NOTE — PROGRESS NOTES
[address] [address]  INPATIENT SPEECH THERAPY  [facility] NEUROSCIENCES 4A  DAILY NOTE    TIME   SLP Individual Minutes  Time In: 1311  Time Out: [address]  Minutes: 25  Timed Code Treatment Minutes: 0 Minutes       Date: 3/22/2021  Patient Name: Shayne Olivares      CSN: [id number]   : 1960  (61 y.o.)  Gender: female   Referring Physician:  KRIS Brice  Diagnosis: Neutropenic Fever   Secondary Diagnosis: Dysphagia  Precautions: Aspiration   Current Diet: Puree diet with thin liquids- established   Swallowing Strategies: Direct 1:1 feeding, upright position   Date of Last MBS/FEES: Not Applicable    Pain:  No pain reported. Subjective:  Patient seen at bedside following return from surgery; alert and awake. DONALD Hinojosa approved ST attempt. Short-Term Goals:  SHORT TERM GOAL #1:  Goal 1: Patient will consume conservative PO trials of ice chips, thin liquids and puree with SLP ONLY in order to determine readiness for initiation of oral diet vs need for instrumental evaluation  INTERVENTIONS:   Patient consumed PO trials of 12 oz of thin liquids via straw; patient demonstrated with appropriate initiation, timely swallow within consecutive straw drinks, no s/s of aspiration. Patient consumed PO trials of whole applesauce cup; slow but functional oral mastication, adequate bolus formation with puree textures, timely swallow, effective oral clearance, improved awareness to bolus within oral cavity, no s/s of aspiration. ST attempted PO trial of jello; patient with oral holding of the bolus, pocketing. Patient with verbal expression of dislike to jello with spitting material out. Patient with good awareness to bolus as evidence by stating \"I didn't swallow it, see it is still in there. \" prior to expectorating jello trial.  Patient refused PO trials of pudding and ice cream stating; \"no milk it gives me diarrhea. \"     Patient with great improvements overall compared to previous dates; recommend

## 2021-03-22 NOTE — CARE COORDINATION
3/22/21, 2:32 PM EDT    DISCHARGE ON GOING [address] [facility] day: 5  Location: -16/016-A Reason for admit: Neutropenic fever ([address] [D70.9, R50.81]   Procedure: 3/22 VENTRICULAR PERITONEAL SHUNT REVISION (N/A )  Barriers to Discharge: WBC 1.2, General Surgery, Neurosurgery, Oncology, ID following, pain control, IV fluids, IV Keppra, IV Zosyn, electrolyte replacement protocols, PT/OT/ST. PCP: Marilou Summers MD  Readmission Risk Score: 13%  Patient Goals/Plan/Treatment Preferences: Plan is to return to [facility]. SW following.

## 2021-03-22 NOTE — ANESTHESIA POSTPROCEDURE EVALUATION
Department of Anesthesiology  Postprocedure Note    Patient: Kim Ramirez  MRN: [id number]  YOB: 1960  Date of evaluation: 3/22/2021  Time:  11:43 AM     Procedure Summary     Date: 03/22/21 Room / Location: [place] [facility] / [place] [facility]    Anesthesia Start: 8809 Anesthesia Stop: 7557    Procedure: VENTRICULAR PERITONEAL SHUNT REVISION (N/A ) Diagnosis: (V/P SHUNT REVISION)    Surgeons: Gilbert Boss MD Responsible Provider: Rigoberto Del Real MD    Anesthesia Type: general ASA Status: 3          Anesthesia Type: general    Jackelyn Phase I: Jackelyn Score: 8    Jackelyn Phase II:      Last vitals: Reviewed and per EMR flowsheets.        Anesthesia Post Evaluation    Patient location during evaluation: PACU  Patient participation: complete - patient participated  Level of consciousness: awake and alert  Airway patency: patent  Nausea & Vomiting: no nausea  Complications: no  Cardiovascular status: blood pressure returned to baseline and hemodynamically stable  Respiratory status: acceptable and spontaneous ventilation  Hydration status: euvolemic

## 2021-03-23 ENCOUNTER — APPOINTMENT (OUTPATIENT)
Dept: CT IMAGING | Age: 61
DRG: 022 | End: 2021-03-23
Payer: MEDICAID

## 2021-03-23 LAB
ANION GAP SERPL CALCULATED.3IONS-SCNC: 9 MEQ/L (ref 8–16)
ANISOCYTOSIS: PRESENT
BASOPHILS # BLD: 0 %
BASOPHILS ABSOLUTE: 0 THOU/MM3 (ref 0–0.1)
BLOOD CULTURE, ROUTINE: NORMAL
BLOOD CULTURE, ROUTINE: NORMAL
BUN BLDV-MCNC: 6 MG/DL (ref 7–22)
CALCIUM SERPL-MCNC: 8.8 MG/DL (ref 8.5–10.5)
CHLORIDE BLD-SCNC: 103 MEQ/L (ref 98–111)
CO2: 22 MEQ/L (ref 23–33)
CREAT SERPL-MCNC: 0.7 MG/DL (ref 0.4–1.2)
EOSINOPHIL # BLD: 0 %
EOSINOPHILS ABSOLUTE: 0 THOU/MM3 (ref 0–0.4)
ERYTHROCYTE [DISTWIDTH] IN BLOOD BY AUTOMATED COUNT: 14.9 % (ref 11.5–14.5)
ERYTHROCYTE [DISTWIDTH] IN BLOOD BY AUTOMATED COUNT: 54.6 FL (ref 35–45)
GFR SERPL CREATININE-BSD FRML MDRD: 85 ML/MIN/1.73M2
GLUCOSE BLD-MCNC: 111 MG/DL (ref 70–108)
HCT VFR BLD CALC: 35.2 % (ref 37–47)
HEMOGLOBIN: 11.2 GM/DL (ref 12–16)
IMMATURE GRANS (ABS): 0.01 THOU/MM3 (ref 0–0.07)
IMMATURE GRANULOCYTES: 0.7 %
LYMPHOCYTES # BLD: 27 %
LYMPHOCYTES ABSOLUTE: 0.4 THOU/MM3 (ref 1–4.8)
MCH RBC QN AUTO: 31.6 PG (ref 26–33)
MCHC RBC AUTO-ENTMCNC: 31.8 GM/DL (ref 32.2–35.5)
MCV RBC AUTO: 99.4 FL (ref 81–99)
MONOCYTES # BLD: 52.6 %
MONOCYTES ABSOLUTE: 0.7 THOU/MM3 (ref 0.4–1.3)
NUCLEATED RED BLOOD CELLS: 0 /100 WBC
PLATELET # BLD: 82 THOU/MM3 (ref 130–400)
PLATELET ESTIMATE: ABNORMAL
PMV BLD AUTO: 12.6 FL (ref 9.4–12.4)
POTASSIUM REFLEX MAGNESIUM: 4.3 MEQ/L (ref 3.5–5.2)
RBC # BLD: 3.54 MILL/MM3 (ref 4.2–5.4)
SCAN OF BLOOD SMEAR: NORMAL
SEG NEUTROPHILS: 19.7 %
SEGMENTED NEUTROPHILS ABSOLUTE COUNT: 0.3 THOU/MM3 (ref 1.8–7.7)
SODIUM BLD-SCNC: 134 MEQ/L (ref 135–145)
WBC # BLD: 1.4 THOU/MM3 (ref 4.8–10.8)

## 2021-03-23 PROCEDURE — 2060000000 HC ICU INTERMEDIATE R&B

## 2021-03-23 PROCEDURE — 80048 BASIC METABOLIC PNL TOTAL CA: CPT

## 2021-03-23 PROCEDURE — 2580000003 HC RX 258: Performed by: PHYSICIAN ASSISTANT

## 2021-03-23 PROCEDURE — 92523 SPEECH SOUND LANG COMPREHEN: CPT

## 2021-03-23 PROCEDURE — 6360000002 HC RX W HCPCS: Performed by: PHYSICIAN ASSISTANT

## 2021-03-23 PROCEDURE — 6370000000 HC RX 637 (ALT 250 FOR IP): Performed by: PHYSICIAN ASSISTANT

## 2021-03-23 PROCEDURE — 99232 SBSQ HOSP IP/OBS MODERATE 35: CPT | Performed by: PHYSICIAN ASSISTANT

## 2021-03-23 PROCEDURE — 85025 COMPLETE CBC W/AUTO DIFF WBC: CPT

## 2021-03-23 PROCEDURE — 97110 THERAPEUTIC EXERCISES: CPT

## 2021-03-23 PROCEDURE — 99024 POSTOP FOLLOW-UP VISIT: CPT | Performed by: NEUROLOGICAL SURGERY

## 2021-03-23 PROCEDURE — 97530 THERAPEUTIC ACTIVITIES: CPT

## 2021-03-23 PROCEDURE — 36415 COLL VENOUS BLD VENIPUNCTURE: CPT

## 2021-03-23 PROCEDURE — 70450 CT HEAD/BRAIN W/O DYE: CPT

## 2021-03-23 PROCEDURE — APPSS30 APP SPLIT SHARED TIME 16-30 MINUTES: Performed by: PHYSICIAN ASSISTANT

## 2021-03-23 PROCEDURE — 99024 POSTOP FOLLOW-UP VISIT: CPT | Performed by: NURSE PRACTITIONER

## 2021-03-23 PROCEDURE — APPSS30 APP SPLIT SHARED TIME 16-30 MINUTES: Performed by: NURSE PRACTITIONER

## 2021-03-23 RX ORDER — SODIUM CHLORIDE 0.9 % (FLUSH) 0.9 %
10 SYRINGE (ML) INJECTION PRN
Status: DISCONTINUED | OUTPATIENT
Start: 2021-03-23 | End: 2021-03-29 | Stop reason: HOSPADM

## 2021-03-23 RX ORDER — LIDOCAINE HYDROCHLORIDE 10 MG/ML
5 INJECTION, SOLUTION EPIDURAL; INFILTRATION; INTRACAUDAL; PERINEURAL ONCE
Status: DISCONTINUED | OUTPATIENT
Start: 2021-03-23 | End: 2021-03-29 | Stop reason: HOSPADM

## 2021-03-23 RX ORDER — SODIUM CHLORIDE 0.9 % (FLUSH) 0.9 %
10 SYRINGE (ML) INJECTION EVERY 12 HOURS SCHEDULED
Status: DISCONTINUED | OUTPATIENT
Start: 2021-03-23 | End: 2021-03-29 | Stop reason: HOSPADM

## 2021-03-23 RX ADMIN — AMLODIPINE BESYLATE 5 MG: 5 TABLET ORAL at 08:48

## 2021-03-23 RX ADMIN — ACETAMINOPHEN 650 MG: 325 TABLET ORAL at 04:52

## 2021-03-23 RX ADMIN — ATORVASTATIN CALCIUM 20 MG: 20 TABLET, FILM COATED ORAL at 21:29

## 2021-03-23 RX ADMIN — LEVETIRACETAM 1000 MG: 100 INJECTION INTRAVENOUS at 18:30

## 2021-03-23 RX ADMIN — LEVETIRACETAM 1000 MG: 100 INJECTION INTRAVENOUS at 04:32

## 2021-03-23 RX ADMIN — PIPERACILLIN AND TAZOBACTAM 3375 MG: 3; .375 INJECTION, POWDER, LYOPHILIZED, FOR SOLUTION INTRAVENOUS at 06:24

## 2021-03-23 RX ADMIN — LOSARTAN POTASSIUM 100 MG: 100 TABLET, FILM COATED ORAL at 08:48

## 2021-03-23 RX ADMIN — RISPERIDONE 0.5 MG: 0.25 TABLET ORAL at 08:48

## 2021-03-23 RX ADMIN — NYSTATIN 500000 UNITS: 100000 SUSPENSION ORAL at 08:47

## 2021-03-23 RX ADMIN — PIPERACILLIN AND TAZOBACTAM 3375 MG: 3; .375 INJECTION, POWDER, LYOPHILIZED, FOR SOLUTION INTRAVENOUS at 12:31

## 2021-03-23 RX ADMIN — ACETAMINOPHEN 650 MG: 325 TABLET ORAL at 18:30

## 2021-03-23 RX ADMIN — SODIUM CHLORIDE: 9 INJECTION, SOLUTION INTRAVENOUS at 18:31

## 2021-03-23 RX ADMIN — RISPERIDONE 0.5 MG: 0.25 TABLET ORAL at 21:29

## 2021-03-23 RX ADMIN — SENNOSIDES 8.6 MG: 8.6 TABLET, FILM COATED ORAL at 21:29

## 2021-03-23 RX ADMIN — PIPERACILLIN AND TAZOBACTAM 3375 MG: 3; .375 INJECTION, POWDER, LYOPHILIZED, FOR SOLUTION INTRAVENOUS at 21:29

## 2021-03-23 RX ADMIN — SODIUM CHLORIDE: 9 INJECTION, SOLUTION INTRAVENOUS at 04:32

## 2021-03-23 ASSESSMENT — PAIN SCALES - GENERAL
PAINLEVEL_OUTOF10: 0
PAINLEVEL_OUTOF10: 0

## 2021-03-23 NOTE — CARE COORDINATION
3/23/21, 2:19 PM EDT    DISCHARGE ON GOING [address] [facility] day: 6  Location: -16/016-A Reason for admit: Neutropenic fever ([facility] [address] [D70.9, R50.81]   Procedure: 3/22  VENTRICULAR PERITONEAL SHUNT REVISION     Laparoscopic-assisted intraabdominal  shunt catheter  Insertion. Per Dr. Leo Salgado.      Barriers to Discharge: PT/OT/ST. ID following, Neurosurgery consult, Hematology following, IV fluids, IV Apresoline prn, pain control, IV Keppra, IV Zosyn. PCP: Marilou Summers MD  Readmission Risk Score: 13%  Patient Goals/Plan/Treatment Preferences: Plan is return to [facility]. SW following.

## 2021-03-23 NOTE — PROGRESS NOTES
[address] [facility] NEUROSCIENCES 4A  Speech - Language - Cognitive Evaluation    SLP Individual Minutes  Time In: 3446  Time Out: 2159  Minutes: 18  Timed Code Treatment Minutes: 0 Minutes       Date: 3/23/2021  Patient Name: Melchor Covarrubias      CSN: [id number]   : 1960  (61 y.o.)  Gender: female   Referring Physician:  KRIS Dempsey  Diagnosis: Neutropenic Fever  Secondary Diagnosis: Dysphagia, Cognitive Impairment  Precautions: fall risk, aspiration risk, neutropenic precaution  History of Present Illness/Injury: Patient admitted to [facility] with above dx. Per chart review, \"60 y.o. female with a past medical history spontaneous intraparenchymal intracranial hemorrhage due to aneurysm (s/p  shunt ), who presented to [facility] with altered mental status, worsening over the last week. Michelle Sepulveda is obtained from chart review and speaking with the ED provider as family left earlier in the evening.  Patient does not respond to questions and mumbles to herself during exam. Per family, patient has had worsening neurological decline since December, now unable to walk. Bryant Deleon , she began having AMS without any obvious signs or symptoms of infection.  She was seen by her neurologist, who is located near their hometown in [facility], who had some concern about her shunt malfunctioning. Nitin Manuel does open her eyes to sound, but does not respond in any other way. Pt completed CSF shunt evaluation on 3/19. CT scan from 3/23/21 indicated interval right frontal approach shunt revision with catheter tip crossing the midline and tip in the left thalamus with postsurgical pneumocephalus in the right lateral ventricle with mild interval decrease in size of the ventricles compared to prior CT. \" ST consulted to assess cognitive domains and ascertain need for goal development/POC.     Past Medical History:   Diagnosis Date    Brain aneurysm     Hypertension     Seizures (HCC)        Pain: No pain reported. Subjective:  Pt resting in bed upon ST arrival. Pt awaken with verbal/tactile cues (I.e sitting upright in bed, sternal rub). Evident fatigue noted as pt required frequent verbal cues to respond to questions this date. Confused mentation noted, as pt would frequently produce nonsensical, tangential phrases. Spontaneous lacrimation observed, however easily redirected. No family members present at bedside. SOCIAL HISTORY:   Concerns for validity of social history, will need to f/u with family to ensure accuracy of report. Living Arrangements: Pt reported \"I lost my home\"   Work History: Retired  Education Level: College degree in early childhood development. Driving Status: Does not drive  Finance Management: Independent  Medication Management: Independent  ADL's: Independent. Hobbies: unable to elicit  Vision Status: Pt stated \"I will wear glasses in February\"  Hearing: WFL  Type of Home: Facility  Home Layout: One level    ORAL MOTOR:  Facial / Labial Impaired Generalized weakness, facial tremor observed.  Ecchymosis on upper L quadrant   Lingual Impaired Generalized weakness   Dentition Impaired Poor dental hygiene   Velum Not Tested    Vocal Quality Impaired Harsh vocal tremor   Sensation Not Tested    Cough Not Tested      SPEECH / VOICE:  Speech: slow, halting speech  Voice: harsh, vocal tremor    LANGUAGE:  Receptive:  1 Step Commands: 2/3  2 Step Commands: 0/3  Simple Yes/No Questions: 3/3  Complex Yes/No Questions: 1/3    Expressive:  Automatic Speech: (Counting 1-10) good  Sentence Completion (automatic): 3/3  Confrontational Namin/3 (MOCA)  Responsive Namin/3; perseveration noted  Conversational Speech: tangential/nonsensical speech, required redirection to answer questions    COGNITION:  Orientation: 0/6 (MOCA)  Immediate Recall: Trial 1: 0/5, Trial 2: 1/5 (MOCA)  Problem Solving: (3 reasons need nurse): 1/3  Sequencing: Complete extensive oral care in order to improve overall oral hygeine. Goal 3: Pt will complete functional carry-over (biographical data, ID call light, orientation, etc) with 60% accuracy max cues to improve functionality within current environment. Goal 4: Pt will complete basic receptive/expressive tasks (following 1-2 step commands, answering yes/no questions, confrontational naming etc) with 60% accuracy max cues to improve communication effificacy and functionality within current/home environment. Goal 5: Monitor mentation; complete further informal cognitive assessment via [address] [place] [address] as it becomes clinically indicated. LONG TERM GOALS:  No LTG given short ELOS.     LINCOLN Shipman, Student Intern  Jose Luis Gr M.A., [address] [place] [address]

## 2021-03-23 NOTE — PROGRESS NOTES
Addendum by Dr. Lily Zuleta MD:  I have seen and examined the patient independently. Face to face evaluation and examination was performed. The below evaluation and note have been reviewed. Labs and radiographs were reviewed. I Have discussed with Neurosurgery PA about this patient in detail. The below assessment and plan have been reviewed. Please see my modifications mentioned below.         My additional comments and modifications  Post op day 1     S/P: revision  shunt placement     - Doing fine.  -Postop brain CT showed the expected postoperative changes. -PT and OT  -Patient rehab consultation for discharge plan. -She can be dischargd from neurosurgical perspective after she is cleared by other medical services. Lily Zuleta MD  Neurosurgery Progress Note    Patient:  [facility] [place]      Unit/Bed:4A-16/016-A    YOB: 1960    MRN: [id number]     Acct: [de-identified]     Admit date: 3/17/2021    Chief Complaint   Patient presents with    Altered Mental Status       Patient Seen, Chart, Physician notes, Labs, Radiology studies reviewed. Subjective: The patient is seen and evaluated on the floor with evaluation exam findings reviewed and discussed with Dr. David Alarcon. Patient is resting comfortably postoperative day #1 from reexploration and revision of ventriculoperitoneal shunt performed by Dr. David Alarcon and assisted by Dr. Kadie Johnson, without complication. Pain was well controlled at the time of exam.    Past, Family, Social History unchanged from admission. Diet:  DIET DYSPHAGIA PUREED;  Low Microbial, Lactose Controlled    Medications:  Scheduled Meds:   sodium chloride flush  10 mL Intravenous 2 times per day    [Held by provider] enoxaparin  40 mg Subcutaneous Q12H    amLODIPine  5 mg Oral Daily    atorvastatin  20 mg Oral Nightly    losartan  100 mg Oral Daily    risperiDONE  0.5 mg Oral BID    senna  1 tablet Oral BID    [Held by provider] 22. 3. There is moderate ventriculomegaly demonstrated. The overall degree of ventriculomegaly appears slightly progressed at the level of the anterior left lateral ventricle when compared to the prior examination dated 3/11/2020. 4. There is an anterior rightward approach ventricular peritoneal shunt catheter with the distal tip abutting the septum pellucidum on axial image 20. This is similar in position when compared to prior examination. This slightly deviates the septum pellucidum towards the left by approximately 4 mm. However, there is no evidence of midline shift at the level of the pineal gland. **This report has been created using voice recognition software. It may contain minor errors which are inherent in voice recognition technology. ** Final report electronically signed by Dr. Jayde Davis on 3/17/2021 8:34 PM    Ct Chest Wo Contrast    Result Date: 3/17/2021  CT chest without contrast. COMPARISON: CT of the abdomen and pelvis performed at the same time. FINDINGS: Visualized thyroid is unremarkable. No supraclavicular or axillary lymphadenopathy. Ascending aorta and main pulmonary artery are normal in caliber. Cardiomegaly. No pericardial effusion. Normal esophagus. Multiple normal-sized mediastinal lymph nodes, nonspecific. Upper Abdomen: Findings are reported on CT of the abdomen and pelvis performed at the same time. Lungs: No pleural effusion. Minimal consolidation along the posterior lung bases. Trachea and central airways are clear. No significant bronchial wall thickening. No bronchiectasis. Musculoskeletal: Mild spondylosis. No acute fracture. 1.  No acute intrathoracic findings. 2.  Minimal consolidation along the right lung base posteriorly, likely representing atelectasis. This document has been electronically signed by:  Doris Rubio MD on 03/17/2021 11:10 PM All CTs at this facility use dose modulation techniques and iterative reconstructions, and/or weight-based dosing when appropriate to reduce radiation to a low as reasonably achievable. Nm Csf Shunt Evaluation    Result Date: 3/19/2021  PROCEDURE: NM CSF SHUNT EVALUATION CLINICAL INFORMATION: Suspected ventriculoperitoneal shunt malfunction Radiopharmaceutical: 1.1 mCi technetium 99m DTPA. TECHNIQUE: Radiopharmaceutical was injected into a shunt reservoir. Immediate and delayed images of the head, neck, chest, abdomen and pelvis were obtained. At the request of Dr. Altagracia Beltre, 5 mL of cerebrospinal fluid was extracted from the shunt reservoir and sent to cytology. COMPARISON: None FINDINGS: There is intense activity in the shunt reservoir and reflux of radiopharmaceutical into the ventricles. There is activity in the proximal shunt tubing overlying the neck. However, no activity is present in shunt tubing overlying the abdomen or pelvis. No radiopharmaceutical is identified in the pelvis. Absence of activity in distal shunt catheter tubing and in the pelvis, highly suspicious for occlusion. Final report electronically signed by Dr. Deandra Ocampo on 3/19/2021 4:01 PM    Xr Shunt Series Placement (<4 Views)    Result Date: 3/17/2021  PROCEDURE: XR SHUNT SERIES PLACEMENT (<4 VIEWS) CLINICAL INFORMATION: Confusion, history of  shunt COMPARISON: No prior study. TECHNIQUE:  Abdomen 2 views, chest single view, skull 2 views. FINDINGS: There is a right frontal approach  shunt catheter present. The visualized portions of the catheter descending along the rightward skull and neck appear intact. The catheter then descends upon the anterior chest and also appears intact. The catheter is then seen overlying the rightward abdomen coursing inferiorly and crossing the pelvis inferiorly terminating at the level of the left lateral abdomen. A portion of the inferior aspect of the catheter overlying the lower pelvis is excluded from the current view.  However, the visualized portions of the catheter overlying the abdomen and pelvis appear grossly intact. There are mildly dilated gas-filled small bowel loops overlying the mid abdomen. Differential considerations would include a possible focal ileus or partial small bowel obstruction. Consider further characterization with CT. 1. There is a right-sided  shunt catheter. The visualized portions of the catheter. Intact. However, there is partial exclusion of the inferior aspect of the catheter overlying the lower pelvis. 2. There are mildly dilated gas-filled small bowel loops overlying the mid abdomen. Differential considerations would include a possible focal ileus or partial small bowel obstruction. Consider further characterization with CT. **This report has been created using voice recognition software. It may contain minor errors which are inherent in voice recognition technology. ** Final report electronically signed by Dr. Brian Salinas on 3/17/2021 8:39 PM    A/P: S/P patient is seen and evaluated on the floor with evaluation exam findings reviewed and discussed with Dr. Donnie Rios and with nursing. Patient is postoperative day #1 following reexploration and revision of ventriculoperitoneal shunt performed by Dr. Donnie Rios and assisted by Dr. Ivett Fitzgerald, without complication. Pain was well controlled at the time of exam as patient is resting comfortably with flat dry incisions and dressings intact. No significant changes noted to the patient chart overnight with CT scan pending for review this morning. Neurosurgery recommends PT and OT as tolerated with consult to TCU for possible placement for ongoing rehabilitation.   Neurosurgery to follow    Electronically signed by Promise Dempsey PA-C on 3/23/2021 at 7:36 AM

## 2021-03-23 NOTE — PLAN OF CARE
Problem: Skin Integrity:  Goal: Will show no infection signs and symptoms  Description: Will show no infection signs and symptoms  Outcome: Ongoing  Note: No signs or symptoms of infection noted. CHG bath daily. Problem: Skin Integrity:  Goal: Absence of new skin breakdown  Description: Absence of new skin breakdown  Outcome: Ongoing  Note: No new signs of skin breakdown. Skin warm, dry, and intact. Mucous membranes pink and moist.  Assistance with turns/ambulation provided every 2 hours and PRN. Will continue to monitor. Problem: Confusion - Acute:  Goal: Mental status will be restored to baseline  Description: Mental status will be restored to baseline  Outcome: Ongoing  Note: Patient is alert and oriented to self and place. Patient confused at baseline. Problem: Discharge Planning:  Goal: Discharged to appropriate level of care  Description: Discharged to appropriate level of care  Outcome: Ongoing  Note: Discharge planning in process and discussed with patient/family. Social work consulted for any additional needs. Care manager aware of discharge needs. Patient is from [facility] and plans to return there upon discharge. Problem: Injury - Risk of, Physical Injury:  Goal: Will remain free from falls  Description: Will remain free from falls  Outcome: Ongoing  Note: Call light in reach, bed in lowest position, and bed alarm activated. Education given on use of call light before ambulation and when in need of assistance. Patient expressed understanding. Hourly visual checks performed and charted. Toileting offered to patient. No falls this shift, at any time. Arm band and falling star in place. Will continue to monitor. Problem: Injury - Risk of, Physical Injury:  Goal: Absence of physical injury  Description: Absence of physical injury  Outcome: Ongoing  Note: Call light in reach, bed in lowest position, and bed alarm activated.   Education given on use of call light before ambulation and when in need of assistance. Patient expressed understanding. Hourly visual checks performed and charted. Toileting offered to patient. No falls this shift, at any time. Arm band and falling star in place. Will continue to monitor. Problem: Pain:  Goal: Pain level will decrease  Description: Pain level will decrease  Outcome: Ongoing  Note: Patient able to use 0-10 pain scale. Denies pain at this time. Patient has a pain goal of \"no pain. \" Agreeable to take PRN pain medications. Problem: Physical Regulation:  Goal: Ability to maintain a body temperature in the normal range will improve  Description: Ability to maintain a body temperature in the normal range will improve  Outcome: Ongoing  Note: Patient's last temp was 98.9 rectally. Will continue to monitor. Problem: Physical Regulation:  Goal: Postoperative complications will be avoided or minimized  Description: Postoperative complications will be avoided or minimized  Outcome: Ongoing  Note: Dressings remain dry and intact. Small amount of drainage noted. CHG bath daily to avoid infections. Problem: Musculor/Skeletal Functional Status  Goal: Highest potential functional level  Outcome: Ongoing  Note: Patient has not been out of bed yet. PT and OT consulted. Care plan reviewed with patient and family. Patient and family verbalize understanding of the plan of care and contribute to goal setting.

## 2021-03-23 NOTE — PROGRESS NOTES
Progress Note  Date:3/23/2021       Room:[address] [place] [address]  Patient Jah Dates     Date of Birth:[birth year]     Age:60 y.o. Subjective    Subjective:  Symptoms:  Improved. She reports malaise. Diet:  Poor intake. Activity level: Impaired due to weakness. Pain:  She reports no pain. Review of Systems  Objective         Vitals Last 24 Hours:  TEMPERATURE:  Temp  Av.1 °F (37.3 °C)  Min: 97.5 °F (36.4 °C)  Max: 102 °F (38.9 °C)  RESPIRATIONS RANGE: Resp  Av  Min: 18  Max: 20  PULSE OXIMETRY RANGE: SpO2  Av.6 %  Min: 91 %  Max: 99 %  PULSE RANGE: Pulse  Av.7  Min: 76  Max: 93  BLOOD PRESSURE RANGE: Systolic (70BTA), AGK:797 , Min:134 , QVP:261   ; Diastolic (86UPB), ZU, Min:60, Max:90    I/O (24Hr): Intake/Output Summary (Last 24 hours) at 3/23/2021 1256  Last data filed at 3/23/2021 0406  Gross per 24 hour   Intake 1825.53 ml   Output 1000 ml   Net 825.53 ml     Objective:  General Appearance:  Comfortable and in no acute distress. Vital signs: (most recent): Blood pressure 134/63, pulse 93, temperature 97.6 °F (36.4 °C), temperature source Oral, resp. rate 20, height 5' 6\" (1.676 m), weight 297 lb 4.8 oz (134.9 kg), SpO2 94 %. Vital signs are normal.    Output: Producing urine and producing stool. HEENT: Normal HEENT exam.    Lungs:  Normal effort and normal respiratory rate. Breath sounds clear to auscultation. Heart: Normal rate. Regular rhythm. Abdomen: Abdomen is soft. Neurological: Patient is alert. Normal strength.       Labs/Imaging/Diagnostics    Labs:  CBC:  Recent Labs     21  0617 21  0346 21  0404   WBC 0.7* 1.2* 1.4*   RBC 4.15* 3.51* 3.54*   HGB 13.4 11.2* 11.2*   HCT 41.4 35.5* 35.2*   MCV 99.8* 101.1* 99.4*   * 89* 82*     CHEMISTRIES:  Recent Labs     21  0617 21  1224    134*   K 3.4* 4.3    103   CO2 26 22*   BUN 5* 6*   CREATININE 0.7 0.7   GLUCOSE 100 111*   MG 2.0  -- PT/INR:No results for input(s): PROTIME, INR in the last 72 hours. APTT:No results for input(s): APTT in the last 72 hours. LIVER PROFILE:  Recent Labs     03/21/21  0617   *   ALT 76*   BILITOT 1.0   ALKPHOS 297*       Imaging Last 24 Hours:  Ct Head Wo Contrast    Result Date: 3/23/2021  PROCEDURE: CT HEAD WO CONTRAST CLINICAL INFORMATION:  shunt replacement. Altered mental status. COMPARISON: CT head dated 3/17/2021. TECHNIQUE: Noncontrast 5 mm axial images were obtained through the brain. Sagittal and coronal reconstructions were created. All CT scans at this facility use dose modulation, iterative reconstruction, and/or weight-based dosing when appropriate to reduce radiation dose to as low as reasonably achievable. FINDINGS: There has been interval revision of the right frontal approach ventriculostomy catheter with a new shunt reservoir and the new catheter again traversing the midline with tip now in the left basal ganglia. There is postsurgical air in the frontal horn and  temporal horn of the right lateral ventricle. Ventricular size is mildly decreased compared to previous exam. No extra-axial fluid collection is present. No intracranial hemorrhage is identified. There is again noted to be a coil pack in the anterior indicating artery region with prominent streak artifact. The calvarium otherwise appears intact. Orbits are unremarkable. There are stable mucous retention cysts in the maxillary sinuses. Mastoid air cells are clear. Interval right frontal approach shunt revision with catheter tip crossing the midline and tip in the left thalamus with postsurgical pneumocephalus in the right lateral ventricle with mild interval decrease in size of the ventricles compared to prior CT. **This report has been created using voice recognition software. It may contain minor errors which are inherent in voice recognition technology. ** Final report electronically signed by Dr. Russ Jama, MD on 3/23/2021 10:45 AM    Assessment//Plan           Hospital Problems           Last Modified POA    Neutropenic fever ([facility] [address] 3/17/2021 Yes    Septicemia ([facility] [address] 3/19/2021 Yes    Altered mental status 3/19/2021 Yes        Assessment:  (Severe neutropenia,multifactorial.  On WBC growth factor. Absolute neutrophil 300,improving but significantly low. Continue Neupogen. Monitor CBC.).        Electronically signed by Beulah Collet, MD on 3/23/21 at 12:56 PM EDT

## 2021-03-23 NOTE — PROGRESS NOTES
Allegra Spears 60  OCCUPATIONAL THERAPY MISSED TREATMENT NOTE  [facility] NEUROSCIENCES 4A  4A-16/016-A      Date: 3/23/2021  Patient Name: Jenny Singh        CSN: [id number]   : 1960  (61 y.o.)  Gender: female                REASON FOR MISSED TREATMENT: Attempted to see pt multiple times this date. Pt working with PT, off unit, and then with physician.

## 2021-03-23 NOTE — PROGRESS NOTES
[address] [place] [address]  INPATIENT PHYSICAL THERAPY  DAILY NOTE  [facility] 4A - 4A-16/016-A    Time In: 805  Time Out:   Timed Code Treatment Minutes: 28 Minutes  Minutes: 32          Date: 3/23/2021  Patient Name: Magui Benites,  Gender:  female        MRN: [id number]  : 1960  (61 y.o.)     Referring Practitioner: KRIS Shannon  Diagnosis: neutropenic fever  Additional Pertinent Hx: per H&P:60 y.o. 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 History is obtained from chart review and speaking with the ED provider as family left earlier in the evening. Patient does not respond to questions and mumbles to herself during exam.Per family, patient has had worsening neurological decline since December, now unable to walk. Last , she began having AMS without any obvious signs or symptoms of infection. She was seen by her neurologist, who is located near their [place] in [place], who had some concern about her shunt malfunctioning. Patient does open her eyes to sound, but does not respond in any other way. CBC was significant for neutropenia and repeated. WBCs were 0.4 and 0.3 on repeat. Associated with fever. Evidence of UTI. No other evidence of infection on imaging or skin exam.  Given vanc/zosyn in ED. Imaging on arrival at [facility] showed moderate ventriculomegaly, slightly progressed compared to imaging from 3/11/2020. Some subcortical white matter hypo-attenuation demonstrated at the level of he left frontal lobe, which could represent an age-indeterminate infarct.   Some fluid noted in pelvis near shunt, could benefit from paracentesis and culture     Prior Level of Function:  Type of Home: Facility  Home Layout: One level        Ambulation Assistance: Needs assistance  Transfer Assistance: Needs assistance  Additional Comments: per brother pt hasn't been ambulatory and requires assist for mobility at [facility]    Restrictions/Precautions:  Restrictions/Precautions: General Precautions, Fall Risk  Position Activity Restriction  Other position/activity restrictions: confusion, per brother pt has been NWB RLE from hx of ankle fx, pt was wearing a boot but hasn't recently had the boot discharged, brother assumes pt is still NWB RLE     SUBJECTIVE: RN approved session and assisted with mobility. Patient was laying in bed sleeping upon arrival. Patient moderately difficult to awaken and slightly confused. Alert to name and  but not oriented to place or time. Patient with no complaints of pain. At the conclusion of therapy, patient left laying in bed with call light in reach and bed alarm on, nursing present. PAIN: 0/10: denies pain     Vitals: Vitals not assessed per clinical judgement, see nursing flowsheet    OBJECTIVE:  Bed Mobility:  Rolling to Left: Maximum Assistance, X 1, with verbal cues    Rolling to Right: Maximum Assistance, X 1, with verbal cues    Supine to Sit: Maximum Assistance, X 2, with head of bed raised, with verbal cues   Sit to Supine: Maximum Assistance, X 2, with head of bed raised, with verbal cues    Scooting: Dependent    Transfers:  Not Tested    Ambulation:  Not Tested      Balance:  Static Sitting Balance:  Contact Guard Assistance, X 1, for 3 min     Exercise:  Patient was guided in 1 set(s) 10 reps of exercise to both lower extremities. Ankle pumps, Quad sets and Heelslides. Exercises were completed for increased independence with functional mobility. Functional Outcome Measures: Completed  AM-PAC Inpatient Mobility Raw Score : 8  AM-PAC Inpatient T-Scale Score : 28.52    ASSESSMENT:  Assessment: Patient progressing toward established goals. Activity Tolerance:  Patient tolerance of  treatment: good.  Patient needs several verbal cues for safety and sequencing. Equipment Recommendations:Equipment Needed: No  Discharge Recommendations:  Continue to assess pending progress, Patient would benefit from continued therapy after discharge, ECF with PT    Plan: Times per week: 3-5x GM  Times per day: Daily  Specific instructions for Next Treatment: therex, bed mobility, sitting balance, PT to assess transfers/gait    Patient Education  Patient Education: Plan of Care, Transfers, Verbal Exercise Instruction    Goals:  Patient goals : not stated  Short term goals  Time Frame for Short term goals: by discharge  Short term goal 1: Roll R/L with modA for pressure reilef  Short term goal 2: sidelying to/from sit with modA to get in/out of bed  Short term goal 3: tolerate >15 min dyn sitting balance activity with SBA to demonstrate inc strength for progression with transfers  Short term goal 4: PT to assess transfers/gait  Long term goals  Time Frame for Long term goals : no LTGs set secondary to short ELOS    Following session, patient left in safe position with all fall risk precautions in place.     Glenys Aden PT, DPT [id number]

## 2021-03-23 NOTE — OP NOTE
[address] [place] [address]                                OPERATIVE REPORT    PATIENT NAME: Terry Larkin            :        1960  MED REC NO:   [id number]                           ROOM:       0016  ACCOUNT NO:   [de-identified]                           ADMIT DATE: 2021  PROVIDER:     BRYN Ellis Musca:  2021    PREOPERATIVE DIAGNOSIS:   shunt revision. POSTOPERATIVE DIAGNOSIS:   shunt revision. PROCEDURE:  Laparoscopic-assisted intraabdominal  shunt catheter  insertion. SURGEON:  Elliott Adamson MD    ANESTHESIA:  General.    ESTIMATED BLOOD LOSS:  Less than 5 mL. DRAINS:  None. COMPLICATIONS:  None. DISPOSITION:  Stable to the recovery room. INDICATIONS:  The patient is a 58-year-old female who I was asked to see  in consultation by Dr. Rick Velazquez of the Neurosurgical Service as he was  planning to proceed with a  shunt revision. He was asking for  assistance of the intraabdominal placement of the shunt catheter  portion, concerned for shunt malfunction. Risk, benefits, and  alternatives were discussed with family. All questions were answered. They agreed to proceed with surgery. DESCRIPTION OF THE PROCEDURE:  After informed consent was signed and  placed on the chart, the patient was taken back to the operating room,  and placed supine on the operating room table. Positioning along with  general anesthesia was carried out in standard fashion per  recommendations by the Neurosurgical Service and Anesthesia. All  questions were answered. They proceeded and the area of concern was  prepped and draped in usual sterile standard fashion. A timeout  occurred prior to the operation, which not only identified the patient,  but also the planned procedure to be performed. At the end of the  timeout, there were no questions or concerns.   For the initial portion  of the surgery, please see Dr. Sandra Edmonds dictated operative note. Upon  entering into the operative room, the patient was already prepped,  draped and asleep. Another timeout occurred. All questions were  answered. I began my portion of the operation by making a small skin  nick at Espinoza's point. Veress needle was inserted. Intraabdominal  cavity was insufflated to a pressure of approximately 15 mmHg with  carbon dioxide gas. The patient tolerated the insufflation well.  5-mm  Optiview was placed in the left lateral abdominal region under direct  vision. Intraabdominal area was entered into without difficulty. Small  transverse incision was then made there on the right side of the  abdomen. The old shunt had been removed. New shunt catheter was  already in position down in the subcutaneous tissues of the abdominal  wall there on the right side. Using the laparoscopic visualization,  there was a fair amount of adhesions up high from previous gallbladder  removal.  We were able go just below this and entered into the  intraabdominal cavity without difficulty. Catheter was then placed  through the intraabdominal wall and then placed down further into the  pelvis. Catheter was tested both prior going through the abdominal wall  and as well as the end of the insertion down in the pelvis and appeared  to be falling freely without difficulty. Subcutaneous tissues otherwise  had good hemostasis. There was no redundancy or residual catheter in  the subcutaneous tissue plane. Laparoscope removed. Trocar removed. The patient tolerated desufflation well. Hemostasis was adequate. Less  than 3 mL of blood loss. Deep dermal tissues were reapproximated with  interrupted 3-0 Vicryl suture. Skin was reapproximated with a running  subcuticular Vicryl suture. Closed incision was cleaned, dried, and  Steri-Strips applied. Dry sterile dressing applied.   Sponge, needle,  and instrumentation count was

## 2021-03-23 NOTE — PROGRESS NOTES
[facility] - Dr. Marsha Andrew  Postoperative Progress Note    Pt Name: Magalys Francisco  Medical Record Number: [id number]  Date of Birth 1960   Today's Date: 3/23/2021    ASSESSMENT   1. POD # 1 Status post laparoscopic assisted intraabdominal  shunt catheter insertion with Dr. Adrien Capone   2.  shunt malfunction   3. Acute encephalopathy  4. Sepsis of unknown etiology  5. Severe neutropenia   6. History of seizures  7. 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 PLAN   1. Diet as tolerated. Speech therapy following. 2. Neurosurgery on board. CT scan this morning reviewed. 3. ID following. Temps overnight. Continue antibiotics. 4. IV hydration   5. PT/OT as tolerated   6. Pain control  7. Routine incisional care  8. Stable from our standpoint. Increase therapy as tolerated. Signing off. Call as needed. SUBJECTIVE   Patient with baseline confusion but did spike temps up to 102. Better this morning after tylenol and ice. Dr. Criss Watson notified. Chart reviewed. Updated by nursing staff. Patient does answer questions in regards to pain but then also disoriented to situation. No N/V. Did eat some of her breakfast. No bowel function. Complains of headache. Follows some commands. Abdominal incisions are clean and intact. No bleeding.    CURRENT MEDICATIONS   Scheduled Meds:   sodium chloride flush  10 mL Intravenous 2 times per day    [Held by provider] enoxaparin  40 mg Subcutaneous Q12H    amLODIPine  5 mg Oral Daily    atorvastatin  20 mg Oral Nightly    losartan  100 mg Oral Daily    risperiDONE  0.5 mg Oral BID    senna  1 tablet Oral BID    [Held by provider] levETIRAcetam  1,000 mg Oral BID    calcium replacement protocol   Other RX Placeholder    piperacillin-tazobactam  3,375 mg Intravenous Q8H    nystatin  5 mL Mouth/Throat 4x Daily    levetiracetam  1,000 mg Intravenous Q12H     Continuous Infusions:   sodium chloride 75 mL/hr at 21 0432    lactated ringers 75 mL/hr at 21 0142     PRN Meds:.sodium chloride flush, promethazine **OR** ondansetron, HYDROcodone 5 mg - acetaminophen, HYDROmorphone, acetaminophen, potassium chloride **OR** potassium alternative oral replacement **OR** potassium chloride, polyethylene glycol, simethicone, hydrALAZINE  OBJECTIVE   CURRENT VITALS:  height is 5' 6\" (1.676 m) and weight is 297 lb 4.8 oz (134.9 kg). Her rectal temperature is 101.4 °F (38.6 °C). Her blood pressure is 136/66 and her pulse is 88. Her respiration is 20 and oxygen saturation is 91%. Temperature Range (24h):Temp: 101.4 °F (38.6 °C) Temp  Av.6 °F (36.4 °C)  Min: 97 °F (36.1 °C)  Max: 102 °F (38.9 °C)  BP Range (97N): Systolic (40FPB), CJE:049 , Min:68 , AGL:779     Diastolic (44LST), TZA:20, Min:44, Max:90    Pulse Range (24h): Pulse  Av.7  Min: 70  Max: 88  Respiration Range (24h): Resp  Av.4  Min: 0  Max: 23  Current Pulse Ox (24h):  SpO2: 91 %  Pulse Ox Range (24h):  SpO2  Av %  Min: 91 %  Max: 100 %  Oxygen Amount and Delivery: O2 Flow Rate (L/min): 1 L/min(weaned to room air)  Incentive Spirometry Tx:            GENERAL: alert but disoriented, confusion, no distress   LUNGS: clear to ausculation, without wheezes, rales or rhonci  HEART: normal rate and regular rhythm  ABDOMEN: obese, non-distended, soft, incisional tenderness, bowel sounds hypoactive  INCISION: dressings are all dry and intact   EXTERMITY: no cyanosis, clubbing. Generalized edema. Right lower extremity > left.   In: 1286.6 [P.O.:75; I.V.:1211.6]  Out: 200 [Urine:200]  Date 21 - 21   Shift 9393-0918 0534-8441 2410-8685 24 Hour Total   INTAKE   I.V.(mL/kg/hr) 610.6(0.6)   610.6   Shift Total(mL/kg) 610.6(4.5)   610.6(4.5)   OUTPUT   Urine(mL/kg/hr) 200(0.2)   200   Shift Total(mL/kg) 200(1.5) 200(1.5)   Weight (kg) 134.9 134.9 134.9 134.9     LABS     Recent Labs     03/21/21  0617 03/22/21  0346 03/23/21  0404   WBC 0.7* 1.2* 1.4*   HGB 13.4 11.2* 11.2*   HCT 41.4 35.5* 35.2*   * 89* 82*     --   --    K 3.4*  --   --      --   --    CO2 26  --   --    BUN 5*  --   --    CREATININE 0.7  --   --    MG 2.0  --   --    CALCIUM 9.9  --   --       No results for input(s): PTT, INR in the last 72 hours. Invalid input(s): PT  Recent Labs     03/21/21 0617   *   ALT 76*   BILITOT 1.0     RADIOLOGY     PROCEDURE: CT HEAD WO CONTRAST       CLINICAL INFORMATION:  shunt replacement. Altered mental status.       COMPARISON: CT head dated 3/17/2021.       TECHNIQUE: Noncontrast 5 mm axial images were obtained through the brain. Sagittal and coronal reconstructions were created.       All CT scans at this facility use dose modulation, iterative reconstruction, and/or weight-based dosing when appropriate to reduce radiation dose to as low as reasonably achievable.       FINDINGS:   There has been interval revision of the right frontal approach ventriculostomy catheter with a new shunt reservoir and the new catheter again traversing the midline with tip now in the left basal ganglia. There is postsurgical air in the frontal horn and    temporal horn of the right lateral ventricle. Ventricular size is mildly decreased compared to previous exam. No extra-axial fluid collection is present. No intracranial hemorrhage is identified. There is again noted to be a coil pack in the anterior    indicating artery region with prominent streak artifact. The calvarium otherwise appears intact. Orbits are unremarkable. There are stable mucous retention cysts in the maxillary sinuses.  Mastoid air cells are clear.           Impression    Interval right frontal approach shunt revision with catheter tip crossing the midline and tip in the left thalamus with postsurgical pneumocephalus in the right lateral ventricle with mild interval decrease in size of the ventricles compared to prior    CT.                   **This report has been created using voice recognition software. It may contain minor errors which are inherent in voice recognition technology. **       Final report electronically signed by Dr. Kai Larsen MD on 3/23/2021 10:45 AM     Electronically signed by NEPTALI Cedeno CNP on 3/23/2021 at 8:22 AM     Above discussed and I agree with Maria Elena Gan CNP. See my additional comments below for updated orders and plan. Labs, cultures, and radiographs where available were reviewed. I discussed patient concerns with Ivelisse Ramirez and instructions were given. Please see our orders for the updated patient care plan.     Electronically signed by Peggy Parada MD on 3/23/21 at 3:09 PM EDT

## 2021-03-23 NOTE — CARE COORDINATION
3/23/21, 1:24 PM EDT    DISCHARGE PLANNING EVALUATION  Spoke with Sabine Jennings at [facility] and [facility]. Made her aware patient is not doing well with therapy. She told SW that they do not need to skill her to come back. Will not need a precert.

## 2021-03-23 NOTE — PROGRESS NOTES
Hospitalist Progress Note      Patient:  Geovanni Fu    Unit/Bed:4A-16/016-A  YOB: 1960  MRN: [id number]   Acct: [de-identified]   PCP: Ghazala Monique MD  Date of Admission: 3/17/2021    Assessment/Plan:    1.  Shunt Malfucntion: 2/2  shunt malfunction. POD1. Neurosurgery & General Surgery together on surgery. Dressing c/d/i. Neuro checks per protocol. 2. Acute encephalopathy: Likely 2/2  shunt issues. Will continue to monitor post-operatively. Neuro checks. Pt is waxing and waning with mentation. 3. Sepsis, unknown etiology: Urine culture contaminant & Blood cultures NGTD. CSF showed no growth. IVF. Continue IV ABX, Vanco stopped on 3/20. ID consulted. On 3/22 night, patient spiked a fever of 102. Tylenol given. Continue IV Zosyn. 4. History of ruptured brain aneurysm status post  shunt 2019: Aware. Neurosurgery consulted. Shuntogram shows high suspicious for occlusion, POD1. Neurosurgery & General Surgery consulted. 5. Neutropenia, severe: Smudge cells. Hematology consulted. WBC 1.4   6. Pancytopenia: Hematology started Granix. WBC 0.5 to 1.4 Hgb ~11 Plts ~80  7. Elevated LFTs: LFT are elevated. No known baseline. LFTs still elevated but stable. CT A/P did not any acute issues of liver. 8. Hypokalemia: Potassium 4.5, 4.8, 3.4. Potassium Replacement Protocol. BMP pending. 9. History of seizures: Continue home medications   10. Essential hypertension: BP has been controlled. Continue home medications   11. Hyperlipidemia: Statin. Chief Complaint: AMS    Initial H and P:-    \"61 y.o. female with a past medical history spontaneous intraparenchymal intracranial hemorrhage due to aneurysm (s/p  shunt 2019), who presented to [facility] with altered mental status, worsening over the last week. History is obtained from chart review and speaking with the ED provider as family left earlier in the evening. Patient does not respond to questions and mumbles to herself during exam.     Per family, patient has had worsening neurological decline since December, now unable to walk. Last Sunday, she began having AMS without any obvious signs or symptoms of infection. She was seen by her neurologist, who is located near [place] in [place], who had some concern about her shunt malfunctioning. Patient does open her eyes to sound, but does not respond in any other way.     CBC was significant for neutropenia and repeated. WBCs were 0.4 and 0.3 on repeat. Associated with fever. Evidence of UTI. No other evidence of infection on imaging or skin exam.  Given vanc/zosyn in ED.     [facility] on [facility] at [facility] showed moderate ventriculomegaly, slightly progressed compared to imaging from 3/11/2020. Some subcortical white matter hypo-attenuation demonstrated at the level of he left frontal lobe, which could represent an age-indeterminate infarct. Some fluid noted in pelvis near shunt, could benefit from paracentesis and culture. \"    3/19: No acute events overnight. Per SW who spoke with NH, nursing home was speaking to family about hospice. Pt is following commands. Pt does not give good conversation. Pt not complaining of pain. 3/20: No acute events overnight. Pt is alert but lethargic. Shuntogram showed possible occulusion. 3/21: No acute events overnight. Pt is more alert and talkative today. Pt understands that she will undergo surgery tomorrow. No issues or complaints. 3/22: No acute events overnight. Pt underwent surgery this AM for  shunt replacement, pt tolerated surgery well. No issues or complaints. Pt more awake. Subjective (past 24 hours):   Pt spiked a fever through the night. Pt has been waxing and weaning with mentation. Dressing c/d/i. Past medical history, family history, social history and allergies reviewed again and is unchanged since admission.     ROS Unable to assess due to mentation     Medications:  Reviewed    Infusion Medications    sodium chloride 75 mL/hr at 03/23/21 0432    lactated ringers 75 mL/hr at 03/22/21 0142     Scheduled Medications    sodium chloride flush  10 mL Intravenous 2 times per day    [Held by provider] enoxaparin  40 mg Subcutaneous Q12H    amLODIPine  5 mg Oral Daily    atorvastatin  20 mg Oral Nightly    losartan  100 mg Oral Daily    risperiDONE  0.5 mg Oral BID    senna  1 tablet Oral BID    [Held by provider] levETIRAcetam  1,000 mg Oral BID    calcium replacement protocol   Other RX Placeholder    piperacillin-tazobactam  3,375 mg Intravenous Q8H    nystatin  5 mL Mouth/Throat 4x Daily    levetiracetam  1,000 mg Intravenous Q12H     PRN Meds: sodium chloride flush, promethazine **OR** ondansetron, HYDROcodone 5 mg - acetaminophen, HYDROmorphone, acetaminophen, potassium chloride **OR** potassium alternative oral replacement **OR** potassium chloride, polyethylene glycol, simethicone, hydrALAZINE      Intake/Output Summary (Last 24 hours) at 3/23/2021 1149  Last data filed at 3/23/2021 0406  Gross per 24 hour   Intake 1825.53 ml   Output 1000 ml   Net 825.53 ml       Diet:  DIET DYSPHAGIA PUREED; Low Microbial, Lactose Controlled    Exam:  /63   Pulse 93   Temp 97.6 °F (36.4 °C) (Oral)   Resp 20   Ht 5' 6\" (1.676 m)   Wt 297 lb 4.8 oz (134.9 kg)   SpO2 94%   BMI 47.99 kg/m²   General appearance: Chronically ill appearing white female   HEENT: Pupils equal, round, and reactive to light. Conjunctivae/corneas clear. Head wrapped in ACE, dressing c/d/i   Neck: Supple, with full range of motion. No jugular venous distention. Trachea midline. Respiratory:  Normal respiratory effort on RA. Clear to auscultation, bilaterally without Rales/Wheezes/Rhonchi. Cardiovascular: Regular rate and rhythm with normal S1/S2 without murmurs, rubs or gallops. Abdomen: Soft, non-tender, non-distended with normal bowel sounds.   Musculoskeletal: passive and active ROM x 4 extremities. Skin: Skin color, texture, turgor normal.  No rashes or lesions. Neurologic:  Pt moving all 4 extremities. Pt following commands. Psychiatric: Alert, following 2 step commands  Capillary Refill: Brisk,< 3 seconds   Peripheral Pulses: +2 palpable, equal bilaterally     Labs:   Recent Labs     03/21/21  0617 03/22/21  0346 03/23/21  0404   WBC 0.7* 1.2* 1.4*   HGB 13.4 11.2* 11.2*   HCT 41.4 35.5* 35.2*   * 89* 82*     Recent Labs     03/21/21 0617      K 3.4*      CO2 26   BUN 5*   CREATININE 0.7   CALCIUM 9.9     Recent Labs     03/21/21 0617   *   ALT 76*   BILITOT 1.0   ALKPHOS 297*     Microbiology:    Blood culture #1:   Lab Results   Component Value Date    BC No growth-preliminary No growth  03/17/2021     Urinalysis:      Lab Results   Component Value Date    NITRU NEGATIVE 03/17/2021    WBCUA 10-15 03/17/2021    BACTERIA MANY 03/17/2021    RBCUA 3-5 03/17/2021    BLOODU NEGATIVE 03/17/2021    GLUCOSEU NEGATIVE 03/17/2021       Radiology:  CT HEAD WO CONTRAST   Final Result    Interval right frontal approach shunt revision with catheter tip crossing the midline and tip in the left thalamus with postsurgical pneumocephalus in the right lateral ventricle with mild interval decrease in size of the ventricles compared to prior    CT. **This report has been created using voice recognition software. It may contain minor errors which are inherent in voice recognition technology. **      Final report electronically signed by Dr. Jeanie Davis MD on 3/23/2021 10:45 AM      RADIOLOGY REPORT   Final Result      NM CSF SHUNT EVALUATION   Final Result      Absence of activity in distal shunt catheter tubing and in the pelvis, highly suspicious for occlusion. Final report electronically signed by Dr. Cintia Taveras on 3/19/2021 4:01 PM      CT CHEST WO CONTRAST   Final Result   1. No acute intrathoracic findings.    2.  Minimal lobe as seen on axial images 20/7/2029. This may represent an age-indeterminate infarct. Although a remote infarct is favored given the appearance of    encephalomalacia on coronal series image 22.      3. There is moderate ventriculomegaly demonstrated. The overall degree of ventriculomegaly appears slightly progressed at the level of the anterior left lateral ventricle when compared to the prior examination dated 3/11/2020.      4. There is an anterior rightward approach ventricular peritoneal shunt catheter with the distal tip abutting the septum pellucidum on axial image 20. This is similar in position when compared to prior examination. This slightly deviates the septum    pellucidum towards the left by approximately 4 mm. However, there is no evidence of midline shift at the level of the pineal gland. **This report has been created using voice recognition software. It may contain minor errors which are inherent in voice recognition technology. **      Final report electronically signed by Dr. Mainor Monterroso on 3/17/2021 8:34 PM        Electronically signed by KRIS Farfan on 3/23/2021 at 11:49 AM

## 2021-03-23 NOTE — PROGRESS NOTES
Progress note: Infectious diseases    Patient - Camryn Mariee,  Age - 61 y.o.    - 1960      Room Number - 4S-26/609-O   MRN -  [id number]   [facility]  Date of Admission -  3/17/2021  6:41 PM    SUBJECTIVE:   No new issues today. She was coughing during my evaluation  Discussed with nursing staff    OBJECTIVE   VITALS    height is 5' 6\" (1.676 m) and weight is 297 lb 4.8 oz (134.9 kg). Her oral temperature is 99 °F (37.2 °C). Her blood pressure is 149/85 (abnormal) and her pulse is 83. Her respiration is 20 and oxygen saturation is 95%.        Wt Readings from Last 3 Encounters:   21 297 lb 4.8 oz (134.9 kg)   20 (!) 331 lb 9.6 oz (150.4 kg)       I/O (24 Hours)    Intake/Output Summary (Last 24 hours) at 3/23/2021 1817  Last data filed at 3/23/2021 1322  Gross per 24 hour   Intake 2339.77 ml   Output 350 ml   Net 1989.77 ml       General Appearance : dressed scalp (post surgery), awake  HEENT - normocephalic, atraumatic, pink conjunctiva,  anicteric sclera  Neck - Supple, no mass  Lungs -  Bilateral   air entry, + rhonchi,    Cardiovascular - Heart sounds are normal.    Abdomen - soft, not distended, nontender,   Neurologic -awake, able to make conversation  Skin - diffuse macular rash, dry skin and scaly  Extremities - chronic leg edema    MEDICATIONS:      sodium chloride flush  10 mL Intravenous 2 times per day    [Held by provider] enoxaparin  40 mg Subcutaneous Q12H    amLODIPine  5 mg Oral Daily    atorvastatin  20 mg Oral Nightly    losartan  100 mg Oral Daily    risperiDONE  0.5 mg Oral BID    senna  1 tablet Oral BID    [Held by provider] levETIRAcetam  1,000 mg Oral BID    calcium replacement protocol   Other RX Placeholder    piperacillin-tazobactam  3,375 mg Intravenous Q8H    nystatin  5 mL Mouth/Throat 4x Daily    levetiracetam  1,000 mg Intravenous Q12H     

## 2021-03-24 LAB
ALBUMIN SERPL-MCNC: 2.3 G/DL (ref 3.5–5.1)
ALP BLD-CCNC: 245 U/L (ref 38–126)
ALT SERPL-CCNC: 28 U/L (ref 11–66)
ANAEROBIC CULTURE: NORMAL
ANION GAP SERPL CALCULATED.3IONS-SCNC: 10 MEQ/L (ref 8–16)
AST SERPL-CCNC: 39 U/L (ref 5–40)
ATYPICAL LYMPHOCYTES: ABNORMAL %
BASOPHILS # BLD: 0 %
BASOPHILS ABSOLUTE: 0 THOU/MM3 (ref 0–0.1)
BILIRUB SERPL-MCNC: 0.5 MG/DL (ref 0.3–1.2)
BILIRUBIN DIRECT: < 0.2 MG/DL (ref 0–0.3)
BUN BLDV-MCNC: 6 MG/DL (ref 7–22)
CALCIUM SERPL-MCNC: 9.4 MG/DL (ref 8.5–10.5)
CHLORIDE BLD-SCNC: 102 MEQ/L (ref 98–111)
CO2: 24 MEQ/L (ref 23–33)
CREAT SERPL-MCNC: 0.7 MG/DL (ref 0.4–1.2)
CSF CULTURE: NORMAL
DIFFERENTIAL, MANUAL: NORMAL
EOSINOPHIL # BLD: 0 %
EOSINOPHILS ABSOLUTE: 0 THOU/MM3 (ref 0–0.4)
ERYTHROCYTE [DISTWIDTH] IN BLOOD BY AUTOMATED COUNT: 15.1 % (ref 11.5–14.5)
ERYTHROCYTE [DISTWIDTH] IN BLOOD BY AUTOMATED COUNT: 56.4 FL (ref 35–45)
GFR SERPL CREATININE-BSD FRML MDRD: 85 ML/MIN/1.73M2
GLUCOSE BLD-MCNC: 98 MG/DL (ref 70–108)
GRAM STAIN RESULT: NORMAL
HCT VFR BLD CALC: 34.5 % (ref 37–47)
HEMOGLOBIN: 10.8 GM/DL (ref 12–16)
LYMPHOCYTES # BLD: 44 %
LYMPHOCYTES ABSOLUTE: 0.9 THOU/MM3 (ref 1–4.8)
MAGNESIUM: 1.8 MG/DL (ref 1.6–2.4)
MCH RBC QN AUTO: 31.6 PG (ref 26–33)
MCHC RBC AUTO-ENTMCNC: 31.3 GM/DL (ref 32.2–35.5)
MCV RBC AUTO: 100.9 FL (ref 81–99)
MONOCYTES # BLD: 29 %
MONOCYTES ABSOLUTE: 0.6 THOU/MM3 (ref 0.4–1.3)
NUCLEATED RED BLOOD CELLS: 0 /100 WBC
PLATELET # BLD: 56 THOU/MM3 (ref 130–400)
PLATELET ESTIMATE: ABNORMAL
PMV BLD AUTO: 13.1 FL (ref 9.4–12.4)
POTASSIUM SERPL-SCNC: 3.1 MEQ/L (ref 3.5–5.2)
RBC # BLD: 3.42 MILL/MM3 (ref 4.2–5.4)
SEG NEUTROPHILS: 27 %
SEGMENTED NEUTROPHILS ABSOLUTE COUNT: 0.5 THOU/MM3 (ref 1.8–7.7)
SMUDGE CELLS: PRESENT
SODIUM BLD-SCNC: 136 MEQ/L (ref 135–145)
TOTAL PROTEIN: 6.9 G/DL (ref 6.1–8)
WBC # BLD: 2 THOU/MM3 (ref 4.8–10.8)

## 2021-03-24 PROCEDURE — 6360000002 HC RX W HCPCS: Performed by: PHYSICIAN ASSISTANT

## 2021-03-24 PROCEDURE — 99232 SBSQ HOSP IP/OBS MODERATE 35: CPT | Performed by: FAMILY MEDICINE

## 2021-03-24 PROCEDURE — 97530 THERAPEUTIC ACTIVITIES: CPT

## 2021-03-24 PROCEDURE — 36415 COLL VENOUS BLD VENIPUNCTURE: CPT

## 2021-03-24 PROCEDURE — 6360000002 HC RX W HCPCS: Performed by: INTERNAL MEDICINE

## 2021-03-24 PROCEDURE — 2580000003 HC RX 258: Performed by: INTERNAL MEDICINE

## 2021-03-24 PROCEDURE — 2580000003 HC RX 258: Performed by: PHYSICIAN ASSISTANT

## 2021-03-24 PROCEDURE — 2580000003 HC RX 258: Performed by: FAMILY MEDICINE

## 2021-03-24 PROCEDURE — 82248 BILIRUBIN DIRECT: CPT

## 2021-03-24 PROCEDURE — 6370000000 HC RX 637 (ALT 250 FOR IP): Performed by: PHYSICIAN ASSISTANT

## 2021-03-24 PROCEDURE — 83735 ASSAY OF MAGNESIUM: CPT

## 2021-03-24 PROCEDURE — 80053 COMPREHEN METABOLIC PANEL: CPT

## 2021-03-24 PROCEDURE — 2060000000 HC ICU INTERMEDIATE R&B

## 2021-03-24 PROCEDURE — 97110 THERAPEUTIC EXERCISES: CPT

## 2021-03-24 PROCEDURE — 99024 POSTOP FOLLOW-UP VISIT: CPT | Performed by: NEUROLOGICAL SURGERY

## 2021-03-24 PROCEDURE — APPSS30 APP SPLIT SHARED TIME 16-30 MINUTES: Performed by: PHYSICIAN ASSISTANT

## 2021-03-24 PROCEDURE — 97166 OT EVAL MOD COMPLEX 45 MIN: CPT

## 2021-03-24 PROCEDURE — 85025 COMPLETE CBC W/AUTO DIFF WBC: CPT

## 2021-03-24 RX ORDER — SODIUM CHLORIDE 0.9 % (FLUSH) 0.9 %
10 SYRINGE (ML) INJECTION EVERY 12 HOURS SCHEDULED
Status: DISCONTINUED | OUTPATIENT
Start: 2021-03-24 | End: 2021-03-29 | Stop reason: HOSPADM

## 2021-03-24 RX ORDER — SODIUM CHLORIDE 0.9 % (FLUSH) 0.9 %
10 SYRINGE (ML) INJECTION PRN
Status: DISCONTINUED | OUTPATIENT
Start: 2021-03-24 | End: 2021-03-29 | Stop reason: HOSPADM

## 2021-03-24 RX ORDER — LIDOCAINE HYDROCHLORIDE 10 MG/ML
5 INJECTION, SOLUTION EPIDURAL; INFILTRATION; INTRACAUDAL; PERINEURAL ONCE
Status: DISCONTINUED | OUTPATIENT
Start: 2021-03-24 | End: 2021-03-29 | Stop reason: HOSPADM

## 2021-03-24 RX ORDER — HYDRALAZINE HYDROCHLORIDE 20 MG/ML
10 INJECTION INTRAMUSCULAR; INTRAVENOUS EVERY 6 HOURS PRN
Status: DISCONTINUED | OUTPATIENT
Start: 2021-03-24 | End: 2021-03-29 | Stop reason: HOSPADM

## 2021-03-24 RX ADMIN — Medication 1250 MG: at 12:40

## 2021-03-24 RX ADMIN — PIPERACILLIN AND TAZOBACTAM 3375 MG: 3; .375 INJECTION, POWDER, LYOPHILIZED, FOR SOLUTION INTRAVENOUS at 20:26

## 2021-03-24 RX ADMIN — SENNOSIDES 8.6 MG: 8.6 TABLET, FILM COATED ORAL at 09:17

## 2021-03-24 RX ADMIN — SODIUM CHLORIDE, PRESERVATIVE FREE 10 ML: 5 INJECTION INTRAVENOUS at 20:29

## 2021-03-24 RX ADMIN — RISPERIDONE 0.5 MG: 0.25 TABLET ORAL at 09:17

## 2021-03-24 RX ADMIN — NYSTATIN 500000 UNITS: 100000 SUSPENSION ORAL at 16:48

## 2021-03-24 RX ADMIN — PIPERACILLIN AND TAZOBACTAM 3375 MG: 3; .375 INJECTION, POWDER, LYOPHILIZED, FOR SOLUTION INTRAVENOUS at 05:17

## 2021-03-24 RX ADMIN — AMLODIPINE BESYLATE 5 MG: 5 TABLET ORAL at 09:17

## 2021-03-24 RX ADMIN — NYSTATIN 500000 UNITS: 100000 SUSPENSION ORAL at 12:42

## 2021-03-24 RX ADMIN — SODIUM CHLORIDE, PRESERVATIVE FREE 10 ML: 5 INJECTION INTRAVENOUS at 20:30

## 2021-03-24 RX ADMIN — LOSARTAN POTASSIUM 100 MG: 100 TABLET, FILM COATED ORAL at 09:17

## 2021-03-24 RX ADMIN — Medication 1250 MG: at 00:01

## 2021-03-24 RX ADMIN — LEVETIRACETAM 1000 MG: 100 INJECTION INTRAVENOUS at 18:43

## 2021-03-24 RX ADMIN — RISPERIDONE 0.5 MG: 0.25 TABLET ORAL at 20:29

## 2021-03-24 RX ADMIN — ACETAMINOPHEN 650 MG: 325 TABLET ORAL at 09:17

## 2021-03-24 RX ADMIN — ATORVASTATIN CALCIUM 20 MG: 20 TABLET, FILM COATED ORAL at 20:29

## 2021-03-24 RX ADMIN — PIPERACILLIN AND TAZOBACTAM 3375 MG: 3; .375 INJECTION, POWDER, LYOPHILIZED, FOR SOLUTION INTRAVENOUS at 12:40

## 2021-03-24 RX ADMIN — NYSTATIN 500000 UNITS: 100000 SUSPENSION ORAL at 20:29

## 2021-03-24 RX ADMIN — NYSTATIN 500000 UNITS: 100000 SUSPENSION ORAL at 09:17

## 2021-03-24 RX ADMIN — LEVETIRACETAM 1000 MG: 100 INJECTION INTRAVENOUS at 05:17

## 2021-03-24 RX ADMIN — SODIUM CHLORIDE: 9 INJECTION, SOLUTION INTRAVENOUS at 14:18

## 2021-03-24 RX ADMIN — SENNOSIDES 8.6 MG: 8.6 TABLET, FILM COATED ORAL at 20:29

## 2021-03-24 ASSESSMENT — PAIN SCALES - GENERAL
PAINLEVEL_OUTOF10: 0
PAINLEVEL_OUTOF10: 0

## 2021-03-24 ASSESSMENT — PAIN SCALES - WONG BAKER
WONGBAKER_NUMERICALRESPONSE: 2
WONGBAKER_NUMERICALRESPONSE: 0

## 2021-03-24 NOTE — PROGRESS NOTES
Addendum by Dr. June Gallardo MD:  I have seen and examined the patient independently. Face to face evaluation and examination was performed. The below evaluation and note have been reviewed. Labs and radiographs were reviewed. I Have discussed with Neurosurgery PA about this patient in detail. The below assessment and plan have been reviewed. Please see my modifications mentioned below. My additional comments and modifications  Post op day 2     S/P: revision  shunt placement     - Doing fine.  -Postop brain CT showed the expected postoperative changes. -PT and OT  -Patient rehab consultation for discharge plan. -She can be dischargd from neurosurgical perspective after she is cleared by other medical services.  -Follow-up with neurosurgery outpatient clinic after 14 days of from her discharge for sutures removal as is and with a new brain CT. June Gallardo MD    Neurosurgery Progress Note    Patient:  Levi Gottlieb [facility]      Unit/Bed:4A-16/016-A    YOB: 1960    MRN: [id number]     Acct: [de-identified]     Admit date: 3/17/2021    Chief Complaint   Patient presents with    Altered Mental Status       Patient Seen, Chart, Physician notes, Labs, Radiology studies reviewed. Subjective: She was seen and evaluated on the floor with evaluation exam findings reviewed and discussed with Dr. Jaswant Rahman and with nursing. Mrs. [facility] is postoperative day #2 from revision and replacement of ventriculoperitoneal shunt performed by Dr. Jaswant Rahman, without complication. He is resting comfortably today with pain well-controlled. Past, Family, Social History unchanged from admission. Diet:  DIET DYSPHAGIA PUREED;  Low Microbial, Lactose Controlled    Medications:  Scheduled Meds:   lidocaine 1 % injection  5 mL Intradermal Once    sodium chloride flush  10 mL Intravenous 2 times per day    vancomycin  1,250 mg Intravenous Q12H    vancomycin (VANCOCIN) intermittent dosing (placeholder)   Other RX Placeholder    [Held by provider] enoxaparin  40 mg Subcutaneous Q12H    amLODIPine  5 mg Oral Daily    atorvastatin  20 mg Oral Nightly    losartan  100 mg Oral Daily    risperiDONE  0.5 mg Oral BID    senna  1 tablet Oral BID    [Held by provider] levETIRAcetam  1,000 mg Oral BID    calcium replacement protocol   Other RX Placeholder    piperacillin-tazobactam  3,375 mg Intravenous Q8H    nystatin  5 mL Mouth/Throat 4x Daily    levetiracetam  1,000 mg Intravenous Q12H     Continuous Infusions:   sodium chloride 75 mL/hr at 03/23/21 1831     PRN Meds:sodium chloride flush, promethazine **OR** ondansetron, HYDROcodone 5 mg - acetaminophen, HYDROmorphone, acetaminophen, potassium chloride **OR** potassium alternative oral replacement **OR** potassium chloride, polyethylene glycol, simethicone, hydrALAZINE    Objective: Patient is resting comfortably today with flat dry dressings intact over her incisions. Pain is well controlled and she remains stable and intact her baseline on exam with no significant changes having been noted to the patient chart overnight. Vitals: /87   Pulse 72   Temp 98.8 °F (37.1 °C) (Rectal)   Resp 20   Ht 5' 6\" (1.676 m)   Wt 297 lb 8 oz (134.9 kg)   SpO2 96%   BMI 48.02 kg/m²   Physical Exam:  Alert and attentive. Language appropriate, with no aphasia. Pupils equal.  Facial strength symmetric. Physical Exam  Stable and grossly intact to her baseline on exam with no significant changes noted the patient chart overnight. ROS:  Review of Systems  Comprehensive review of systems is unobtainable due to patient status. The patient does deny headache with some complaints of mild incisional site discomfort noted. 24 hour intake/output:    Intake/Output Summary (Last 24 hours) at 3/24/2021 0854  Last data filed at 3/24/2021 0343  Gross per 24 hour   Intake 2764.97 ml   Output 1100 ml   Net 1664.97 ml     Last 3 weights:   Wt 3/17/2021  PROCEDURE: CT HEAD WO CONTRAST CLINICAL INFORMATION: Altered mental status, status post  shunt COMPARISON: Outside CT examination dated 3/11/2020. TECHNIQUE:  Axial CT images were obtained through the head without contrast. Coronal and sagittal reformatted images were rendered. All CT scans at this facility use dose modulation, iterative reconstruction, and/or weight-based dosing when appropriate to reduce radiation dose to as low as reasonably achievable. FINDINGS: No acute intracranial hemorrhage or mass effect is seen. There is moderate ventriculomegaly demonstrated. The overall degree of ventriculomegaly appears slightly progressed at the level of the anterior left lateral ventricle when compared to the prior examination dated 3/11/2020. There is an anterior rightward approach ventricular peritoneal shunt catheter with the distal tip abutting the septum pellucidum on axial image 20. This is similar in position when compared to prior examination. This slightly deviates the septum pellucidum towards the left by approximately 4 mm. However, there is no evidence of midline shift at the level of the pineal gland. The basal cisterns appear within normal limits. The posterior fossa appears normal. There is subcortical white matter hypoattenuation demonstrated at the level of the left frontal lobe as seen on axial images 20/7/2029. This may represent an age-indeterminate infarct. No acute abnormalities of the calvarium are seen. There is mild mucosal thickening within the maxillary sinuses. The mastoid air cells appear well-pneumatized. 1. No acute intracranial hemorrhage or mass effect. 2. There is subcortical white matter hypoattenuation demonstrated at the level of the left frontal lobe as seen on axial images 20/7/2029. This may represent an age-indeterminate infarct.  Although a remote infarct is favored given the appearance of encephalomalacia on coronal series image 22. 3. There is moderate ventriculomegaly demonstrated. The overall degree of ventriculomegaly appears slightly progressed at the level of the anterior left lateral ventricle when compared to the prior examination dated 3/11/2020. 4. There is an anterior rightward approach ventricular peritoneal shunt catheter with the distal tip abutting the septum pellucidum on axial image 20. This is similar in position when compared to prior examination. This slightly deviates the septum pellucidum towards the left by approximately 4 mm. However, there is no evidence of midline shift at the level of the pineal gland. **This report has been created using voice recognition software. It may contain minor errors which are inherent in voice recognition technology. ** Final report electronically signed by Dr. Valdemar Rosales on 3/17/2021 8:34 PM    Ct Chest Wo Contrast    Result Date: 3/17/2021  CT chest without contrast. COMPARISON: CT of the abdomen and pelvis performed at the same time. FINDINGS: Visualized thyroid is unremarkable. No supraclavicular or axillary lymphadenopathy. Ascending aorta and main pulmonary artery are normal in caliber. Cardiomegaly. No pericardial effusion. Normal esophagus. Multiple normal-sized mediastinal lymph nodes, nonspecific. Upper Abdomen: Findings are reported on CT of the abdomen and pelvis performed at the same time. Lungs: No pleural effusion. Minimal consolidation along the posterior lung bases. Trachea and central airways are clear. No significant bronchial wall thickening. No bronchiectasis. Musculoskeletal: Mild spondylosis. No acute fracture. 1.  No acute intrathoracic findings. 2.  Minimal consolidation along the right lung base posteriorly, likely representing atelectasis. This document has been electronically signed by:  Nikki Ortiz MD on 03/17/2021 11:10 PM All CTs at this facility use dose modulation techniques and iterative reconstructions, and/or weight-based dosing when appropriate to reduce radiation to a low as reasonably achievable. Nm Csf Shunt Evaluation    Result Date: 3/19/2021  PROCEDURE: NM CSF SHUNT EVALUATION CLINICAL INFORMATION: Suspected ventriculoperitoneal shunt malfunction Radiopharmaceutical: 1.1 mCi technetium 99m DTPA. TECHNIQUE: Radiopharmaceutical was injected into a shunt reservoir. Immediate and delayed images of the head, neck, chest, abdomen and pelvis were obtained. At the request of Dr. Sarah Butler, 5 mL of cerebrospinal fluid was extracted from the shunt reservoir and sent to cytology. COMPARISON: None FINDINGS: There is intense activity in the shunt reservoir and reflux of radiopharmaceutical into the ventricles. There is activity in the proximal shunt tubing overlying the neck. However, no activity is present in shunt tubing overlying the abdomen or pelvis. No radiopharmaceutical is identified in the pelvis. Absence of activity in distal shunt catheter tubing and in the pelvis, highly suspicious for occlusion. Final report electronically signed by Dr. Lesly Davis on 3/19/2021 4:01 PM    Xr Shunt Series Placement (<4 Views)    Result Date: 3/17/2021  PROCEDURE: XR SHUNT SERIES PLACEMENT (<4 VIEWS) CLINICAL INFORMATION: Confusion, history of  shunt COMPARISON: No prior study. TECHNIQUE:  Abdomen 2 views, chest single view, skull 2 views. FINDINGS: There is a right frontal approach  shunt catheter present. The visualized portions of the catheter descending along the rightward skull and neck appear intact. The catheter then descends upon the anterior chest and also appears intact. The catheter is then seen overlying the rightward abdomen coursing inferiorly and crossing the pelvis inferiorly terminating at the level of the left lateral abdomen. A portion of the inferior aspect of the catheter overlying the lower pelvis is excluded from the current view. However, the visualized portions of the catheter overlying the abdomen and pelvis appear grossly intact.  There are mildly dilated gas-filled small bowel loops overlying the mid abdomen. Differential considerations would include a possible focal ileus or partial small bowel obstruction. Consider further characterization with CT. 1. There is a right-sided  shunt catheter. The visualized portions of the catheter. Intact. However, there is partial exclusion of the inferior aspect of the catheter overlying the lower pelvis. 2. There are mildly dilated gas-filled small bowel loops overlying the mid abdomen. Differential considerations would include a possible focal ileus or partial small bowel obstruction. Consider further characterization with CT. **This report has been created using voice recognition software. It may contain minor errors which are inherent in voice recognition technology. ** Final report electronically signed by Dr. Kristal Carrillo on 3/17/2021 8:39 PM    A/P: S/P patient is seen and evaluated on the floor with evaluation exam findings reviewed and discussed with Dr. Travis Hicks and with nursing. Patient is comfortable postoperative day #2 from revision and replacement of ventriculoperitoneal shunt performed by Dr. Travis Hicks, without complication. Dressings are intact over flat dry incisions and pain is well controlled at the time of exam.  Surgery is recommending discharge planning at this time with a follow-up with neurosurgery recommended for 15 days postoperatively for suture removal.  Her follow-up visit is to include a new CT scan of the head to be imaged without contrast for review at that visit. Again, patient may be discharged from a neurosurgical perspective as criteria are met.   Neurosurgery to follow    Electronically signed by Jonn Villareal PA-C on 3/24/2021 at 8:54 AM

## 2021-03-24 NOTE — PLAN OF CARE
ambulation and when in need of assistance. Patient expressed understanding. Hourly visual checks performed and charted. Toileting offered to patient. No falls this shift, at any time. Arm band and falling star in place. Will continue to monitor. Problem: Pain:  Goal: Pain level will decrease  Description: Pain level will decrease  Outcome: Ongoing  Note: Patient able to use 0-10 pain scale. Denies pain at this time. Patient has a pain goal of \"no pain. \" Agreeable to take PRN pain medications. Problem: Physical Regulation:  Goal: Ability to maintain a body temperature in the normal range will improve  Description: Ability to maintain a body temperature in the normal range will improve  Outcome: Ongoing  Note: Patient's last temp was 97.9 rectally. Will continue to monitor. Problem: Physical Regulation:  Goal: Postoperative complications will be avoided or minimized  Description: Postoperative complications will be avoided or minimized  Outcome: Ongoing  Note: Dressings remain dry and intact. Small amount of drainage noted. CHG bath daily to avoid infections. Problem: Musculor/Skeletal Functional Status  Goal: Highest potential functional level  Outcome: Ongoing  Note: Patient has not been out of bed yet. PT and OT consulted. Care plan reviewed with patient and family. Patient and family verbalize understanding of the plan of care and contribute to goal setting.

## 2021-03-24 NOTE — CARE COORDINATION
3/24/21, 2:10 PM EDT    DISCHARGE ON GOING [facility] day: 7  Location: -16/016-A Reason for admit: Neutropenic fever ([facility] [address] [D70.9, R50.81]   Procedure:  3/22  VENTRICULAR PERITONEAL SHUNT REVISION     Barriers to Discharge: PT/OT/ST, pain control, Neurosurgery following, PICC line placement, ID following, IV fluids, IV hydralazine, IV Keppra, IV Zosyn, IV Vancomycin, electrolyte replacement protocol. WBC 2.0, Hgb 10.8, platelets 56. PCP: Ghazala Monique MD  Readmission Risk Score: 14%  Patient Goals/Plan/Treatment Preferences: Plan is return to [address] [place] [address]. SW following.

## 2021-03-24 NOTE — PROGRESS NOTES
[address] [place] [address]  INPATIENT PHYSICAL THERAPY  DAILY NOTE  [facility] 4A - 4A-16/016-A    Time In: 1520  Time Out: 1546  Timed Code Treatment Minutes: 26 Minutes  Minutes: 26          Date: 3/24/2021  Patient Name: Astrid Garg,  Gender:  female        MRN: [id number]  : 1960  (61 y.o.)     Referring Practitioner: KRIS Fraga  Diagnosis: neutropenic fever  Additional Pertinent Hx: per H&P:60 y.o. 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 History is obtained from chart review and speaking with the ED provider as family left earlier in the evening. Patient does not respond to questions and mumbles to herself during exam.Per family, patient has had worsening neurological decline since December, now unable to walk. Last , she began having AMS without any obvious signs or symptoms of infection. She was seen by her neurologist, who is located near their [place], who had some concern about her shunt malfunctioning. Patient does open her eyes to sound, but does not respond in any other way. CBC was significant for neutropenia and repeated. WBCs were 0.4 and 0.3 on repeat. Associated with fever. Evidence of UTI. No other evidence of infection on imaging or skin exam.  Given vanc/zosyn in ED. Imaging on arrival at [facility] showed moderate ventriculomegaly, slightly progressed compared to imaging from 3/11/2020. Some subcortical white matter hypo-attenuation demonstrated at the level of he left frontal lobe, which could represent an age-indeterminate infarct.   Some fluid noted in pelvis near shunt, could benefit from paracentesis and culture     Prior Level of Function:  Type of Home: Facility  Home Layout: One level        ADL Assistance: Needs assistance  Homemaking Responsibilities: No  Ambulation Assistance: Needs assistance  Transfer Assistance: Needs assistance  Additional Comments: per brother pt hasn't been ambulatory and requires assist for mobility at [facility] [place]    Restrictions/Precautions:  Restrictions/Precautions: General Precautions, Fall Risk  Position Activity Restriction  Other position/activity restrictions: confusion, per brother pt has been NWB RLE from hx of ankle fx, pt was wearing a boot but hasn't recently had the boot discharged, brother assumes pt is still NWB RLE     SUBJECTIVE: RN approved session. Patient was laying in bed upon arrival. Agreeable to therapy. Throughout session patient kept calling herself \"stupid\". Patient was reassured she was not stupid. Patient was left laying comfortably in bed with call light in reach and bed alarm on. PAIN: did not state     Vitals: Vitals not assessed per clinical judgement, see nursing flowsheet    OBJECTIVE:  Bed Mobility:  Rolling to Left: Maximum Assistance, X 1, with verbal cues , with increased time for completion   Rolling to Right: Maximum Assistance, X 1, with verbal cues , with increased time for completion   Supine to Sit: Maximum Assistance, X 1, with head of bed raised, with verbal cues , with increased time for completion  Sit to Supine: Maximum Assistance, X 1, with head of bed raised, with verbal cues , with increased time for completion   Scooting: Dependent, X 2  *Increaed time for bed mobility required due to patient being distracted easily, losing focus and requiring constant redirecting    Transfers:  Not Tested    Ambulation:  Not Tested      Exercise:  Patient was guided in 1 set(s) 10 reps of exercise to both lower extremities. Ankle pumps, Quad sets and Heelslides. Exercises were completed for increased independence with functional mobility.    *Increased time for completion due to patient requiring redirecting    Functional Outcome Measures: Completed  AM-[facility] Inpatient Mobility Raw Score : 8  AM-[facility] Inpatient T-Scale Score : 28.52    ASSESSMENT:  Assessment: Patient progressing toward established goals. Activity Tolerance:  Patient tolerance of  treatment: good. Patient needs maximal verbal cues for sequencing, safety and staying on task. Equipment Recommendations:Equipment Needed: No  Discharge Recommendations:  Continue to assess pending progress, Patient would benefit from continued therapy after discharge, ECF with PT    Plan: Times per week: 3-5x GM  Times per day: Daily  Specific instructions for Next Treatment: therex, bed mobility, sitting balance, PT to assess transfers/gait    Patient Education  Patient Education: Bed Mobility, Verbal Exercise Instruction    Goals:  Patient goals : not stated  Short term goals  Time Frame for Short term goals: by discharge  Short term goal 1: Roll R/L with modA for pressure reilef  Short term goal 2: sidelying to/from sit with modA to get in/out of bed  Short term goal 3: tolerate >15 min dyn sitting balance activity with SBA to demonstrate inc strength for progression with transfers  Short term goal 4: PT to assess transfers/gait  Long term goals  Time Frame for Long term goals : no LTGs set secondary to short ELOS    Following session, patient left in safe position with all fall risk precautions in place.     Xochitl Castellanos PT, DPT [id number]

## 2021-03-24 NOTE — PROGRESS NOTES
Pharmacy Note  Vancomycin Consult    Junito Walker is a 61 y.o. female started on Vancomycin for fever post  shunt revision; consult received from Dr. Altagracia Beltre to manage therapy. Also receiving the following antibiotics: none. Patient Active Problem List   Diagnosis    Neutropenic fever ([facility] [address]    Septicemia ([facility] [address]    Altered mental status     Allergies:  Geodon [ziprasidone]     Temp max: 101.4    Recent Labs     03/21/21  0617 03/22/21  0346 03/23/21  0404 03/23/21  1224   BUN 5*  --   --  6*   CREATININE 0.7  --   --  0.7   WBC 0.7* 1.2* 1.4*  --        Intake/Output Summary (Last 24 hours) at 3/23/2021 2026  Last data filed at 3/23/2021 1322  Gross per 24 hour   Intake 1663.77 ml   Output 350 ml   Net 1313.77 ml     Culture Date Source Results   3/17/21 BCx2 No growth   3/19/21 CSF No growth   3/17/21 UC Possible contaminant         Ht Readings from Last 1 Encounters:   03/18/21 5' 6\" (1.676 m)        Wt Readings from Last 1 Encounters:   03/23/21 297 lb 4.8 oz (134.9 kg)       Body mass index is 47.99 kg/m². Estimated Creatinine Clearance: 121 mL/min (based on SCr of 0.7 mg/dL). Goal Trough Level: 10-20 mcg/mL    Assessment/Plan:  Will initiate Vancomycin 1250 mg IV every 12 hours. Timing of trough level will be determined based on culture results, renal function, and clinical response. Thank you for the consult. Will continue to follow.     Sarah Lindsey RPh  3/23/2021  8:30 PM

## 2021-03-24 NOTE — PROGRESS NOTES
Attempted to place PICC line verbal consent with sister but is not POA. Patient apparently told her sister that she will be willing to get PICC line. Once I arrived patient is sleep and unable to awaken to sign consent. Spoke with Aubrey Martin RN regarding current situation will continue to monitor need for PICC line.

## 2021-03-24 NOTE — PROGRESS NOTES
hydrALAZINE      Intake/Output Summary (Last 24 hours) at 3/24/2021 1424  Last data filed at 3/24/2021 1359  Gross per 24 hour   Intake 3021.11 ml   Output 1600 ml   Net 1421.11 ml       Diet:  DIET DYSPHAGIA PUREED; Low Microbial, Lactose Controlled    Exam:  BP (!) 150/70   Pulse 70   Temp 98.2 °F (36.8 °C) (Oral)   Resp 18   Ht 5' 6\" (1.676 m)   Wt 297 lb 8 oz (134.9 kg)   SpO2 95%   BMI 48.02 kg/m²     General appearance: Chronically ill appearing white female, asleep   HEENT: Pupils equal, round, and reactive to light. Conjunctivae clear. Head wrapped in ACE, dressing c/d/i. Neck: Supple, with full range of motion. No jugular venous distention. Trachea midline. Respiratory:  Normal respiratory effort on RA. Clear to auscultation, bilaterally without Rales/Wheezes/Rhonchi on anterior lung fields. Hard to assess posterior lung fields as pt did not follow commands   Cardiovascular: normal rate, regular rhythm with normal S1/S2 without murmurs, rubs or gallops. Abdomen: Soft, non-tender, non-distended with normal bowel sounds. Musculoskeletal: passive and active ROM x 4 extremities. Skin: No rashes or lesions. Neurological exam: limited exam ( patient did not follow commands, asleep, moves extremities equally with pain ( I tried to do sternal rub)  Exam of extremities: peripheral pulses normal, no pedal edema, (+) grade 1 pitting edema on both legs ( from below knees to distal legs), no clubbing or cyanosis         Labs:   Recent Labs     03/22/21  0346 03/23/21  0404 03/24/21  0437   WBC 1.2* 1.4* 2.0*   HGB 11.2* 11.2* 10.8*   HCT 35.5* 35.2* 34.5*   PLT 89* 82* 56*     Recent Labs     03/23/21  1224   *   K 4.3      CO2 22*   BUN 6*   CREATININE 0.7   CALCIUM 8.8     No results for input(s): AST, ALT, BILIDIR, BILITOT, ALKPHOS in the last 72 hours.   Microbiology:    Blood culture #1:   Lab Results   Component Value Date    BC No growth-preliminary No growth  03/17/2021     Urinalysis: Lab Results   Component Value Date    NITRU NEGATIVE 03/17/2021    WBCUA 10-15 03/17/2021    BACTERIA MANY 03/17/2021    RBCUA 3-5 03/17/2021    BLOODU NEGATIVE 03/17/2021    GLUCOSEU NEGATIVE 03/17/2021       Radiology:  CT HEAD WO CONTRAST   Final Result    Interval right frontal approach shunt revision with catheter tip crossing the midline and tip in the left thalamus with postsurgical pneumocephalus in the right lateral ventricle with mild interval decrease in size of the ventricles compared to prior    CT. **This report has been created using voice recognition software. It may contain minor errors which are inherent in voice recognition technology. **      Final report electronically signed by Dr. Dima Dickerson MD on 3/23/2021 10:45 AM      RADIOLOGY REPORT   Final Result      NM CSF SHUNT EVALUATION   Final Result      Absence of activity in distal shunt catheter tubing and in the pelvis, highly suspicious for occlusion. Final report electronically signed by Dr. Sarah Adams on 3/19/2021 4:01 PM      CT CHEST WO CONTRAST   Final Result   1. No acute intrathoracic findings. 2.  Minimal consolidation along the right lung base posteriorly, likely    representing atelectasis. This document has been electronically signed by: Aminta Gunter MD on    03/17/2021 11:10 PM      All CTs at this facility use dose modulation techniques and iterative    reconstructions, and/or weight-based dosing   when appropriate to reduce radiation to a low as reasonably achievable. CT ABDOMEN PELVIS WO CONTRAST Additional Contrast? None   Final Result   1. No acute intra-abdominal or pelvic findings. No bowel obstruction. No renal obstruction. 2.  Small amount of fluid present within the lower abdomen/pelvis. This    is likely secondary to the ventriculoperitoneal shunt which is in close    proximity to this region. This document has been electronically signed by:  Aminta Gunter MD on    03/17/2021 11:03 PM      All CTs at this facility use dose modulation techniques and iterative    reconstructions, and/or weight-based dosing   when appropriate to reduce radiation to a low as reasonably achievable. XR SHUNT SERIES PLACEMENT (<4 VIEWS)   Final Result   1. There is a right-sided  shunt catheter. The visualized portions of the catheter. Intact. However, there is partial exclusion of the inferior aspect of the catheter overlying the lower pelvis. 2. There are mildly dilated gas-filled small bowel loops overlying the mid abdomen. Differential considerations would include a possible focal ileus or partial small bowel obstruction. Consider further characterization with CT. **This report has been created using voice recognition software. It may contain minor errors which are inherent in voice recognition technology. **      Final report electronically signed by Dr. Trixie Brumfield on 3/17/2021 8:39 PM      CT HEAD WO CONTRAST   Final Result   1. No acute intracranial hemorrhage or mass effect. 2. There is subcortical white matter hypoattenuation demonstrated at the level of the left frontal lobe as seen on axial images 20/7/2029. This may represent an age-indeterminate infarct. Although a remote infarct is favored given the appearance of    encephalomalacia on coronal series image 22.      3. There is moderate ventriculomegaly demonstrated. The overall degree of ventriculomegaly appears slightly progressed at the level of the anterior left lateral ventricle when compared to the prior examination dated 3/11/2020.      4. There is an anterior rightward approach ventricular peritoneal shunt catheter with the distal tip abutting the septum pellucidum on axial image 20. This is similar in position when compared to prior examination. This slightly deviates the septum    pellucidum towards the left by approximately 4 mm.  However, there is no evidence of midline shift at the level of the pineal gland. **This report has been created using voice recognition software. It may contain minor errors which are inherent in voice recognition technology. **      Final report electronically signed by Dr. Marzette Boast on 3/17/2021 8:34 PM          Assessment/Plan:      1.  Shunt Malfucntion: s/p revision  shunt placement on 3/22/2021. Neurosurgery & General Surgery together on surgery. Appreciate specialists input. Dressing c/d/i. Neuro checks per protocol. 2. Acute encephalopathy: Likely due to  shunt malfunction. Will continue to monitor post-operatively. Neuro checks. Pt is waxing and waning with mentation. 3. Sepsis, unknown etiology, resolving: Urine culture contaminant & Blood cultures NG. CSF showed no growth. IVF. Continue IV ABX. On 3/22 night, patient spiked a fever of 102. Tylenol given. Continue IV vanco  and Zosyn ( start date 3/17/2021 for both abx). ID on-board. ID recommend continue current antibiotics. Appreciate ID input. 4. History of ruptured brain aneurysm status post  shunt 2019:  Neurosurgery on-board. Shuntogram shows high suspicious for occlusion, POD1. Neurosurgery & General Surgery consulted. 5. Severe Neutropenia with fever, improving: Smudge cells. Hematology on-board, appreciate hematology input. 6. Pancytopenia: Hematology started Granix. Appreciate hematology input. Leukopenia improving, anemia stable, thrombocytopenia slightly worsening. Cont to hold lovenox. Cont SCD for DVT prophylaxis  7. Elevated LFTs: LFTs are elevated. No known baseline. LFTs still elevated but stable. CT A/P did not any acute issues of liver. 8. Hyponatremia, mild, resolved  9. B/L LE edema: DC IV fluids  10. Hypokalemia: Potassium Replacement Protocol. Magnesium 1.8. Start magnesium oxide oral as ordered. 11. Hypoalbuminemia/low BUN, rule out malnutrition: Consult dietitian  12. History of seizures: cont Keppra IV  13. Essential hypertension: BP fluctuates.  Continue home

## 2021-03-24 NOTE — PROGRESS NOTES
Progress note: Infectious diseases    Patient - Adan Caba,  Age - 61 y.o.    - 1960      Room Number - 4A-16/016-A   MRN -  [id number]   Acct # - [de-identified]  Date of Admission -  3/17/2021  6:41 PM    SUBJECTIVE:   No new issues, she appears to be more awake and answers questions. She denies any fever or chills. No vomiting. OBJECTIVE   VITALS    height is 5' 6\" (1.676 m) and weight is 297 lb 8 oz (134.9 kg). Her oral temperature is 98.2 °F (36.8 °C). Her blood pressure is 156/69 (abnormal) and her pulse is 80. Her respiration is 18 and oxygen saturation is 97%. Wt Readings from Last 3 Encounters:   21 297 lb 8 oz (134.9 kg)   20 (!) 331 lb 9.6 oz (150.4 kg)       I/O (24 Hours)    Intake/Output Summary (Last 24 hours) at 3/24/2021 1711  Last data filed at 3/24/2021 1359  Gross per 24 hour   Intake 3021.11 ml   Output 1600 ml   Net 1421.11 ml       General Appearance : dressed scalp ,clean dressing. Awake and oriented.   HEENT - normocephalic, atraumatic, pink conjunctiva,  anicteric sclera  Neck - Supple, no mass  Lungs -  Bilateral   air entry, + rhonchi,    Cardiovascular - Heart sounds are normal.    Abdomen - soft, not distended, nontender,   Neurologic -awake, able to make conversation  Skin - diffuse macular rash, dry skin and scaly  Extremities - chronic leg edema    MEDICATIONS:      lidocaine 1 % injection  5 mL Intradermal Once    sodium chloride flush  10 mL Intravenous 2 times per day    lidocaine 1 % injection  5 mL Intradermal Once    sodium chloride flush  10 mL Intravenous 2 times per day    vancomycin  1,250 mg Intravenous Q12H    vancomycin (VANCOCIN) intermittent dosing (placeholder)   Other RX Placeholder    [Held by provider] enoxaparin  40 mg Subcutaneous Q12H    amLODIPine  5 mg Oral Daily    atorvastatin  20 mg Oral Nightly    losartan  100 mg Oral Daily    risperiDONE  0.5 mg Oral BID    senna  1 tablet Oral BID    [Held by provider] levETIRAcetam  1,000 mg Oral BID    calcium replacement protocol   Other RX Placeholder    piperacillin-tazobactam  3,375 mg Intravenous Q8H    nystatin  5 mL Mouth/Throat 4x Daily    levetiracetam  1,000 mg Intravenous Q12H       sodium chloride flush, sodium chloride flush, promethazine **OR** ondansetron, HYDROcodone 5 mg - acetaminophen, HYDROmorphone, acetaminophen, potassium chloride **OR** potassium alternative oral replacement **OR** potassium chloride, polyethylene glycol, simethicone, hydrALAZINE      LABS:     CBC:   Recent Labs     03/22/21  0346 03/23/21  0404 03/24/21  0437   WBC 1.2* 1.4* 2.0*   HGB 11.2* 11.2* 10.8*   PLT 89* 82* 56*     BMP:    Recent Labs     03/23/21  1224 03/24/21  1455   * 136   K 4.3 3.1*    102   CO2 22* 24   BUN 6* 6*   CREATININE 0.7 0.7   GLUCOSE 111* 98     Calcium:  Recent Labs     03/24/21  1455   CALCIUM 9.4     Ionized Calcium:No results for input(s): IONCA in the last 72 hours. Magnesium:  Recent Labs     03/24/21  1455   MG 1.8    INR:   No results for input(s): INR in the last 72 hours. Hepatic:   Recent Labs     03/24/21  1455   ALKPHOS 245*   ALT 28   AST 39   PROT 6.9   BILITOT 0.5   BILIDIR <0.2   LABALBU 2.3*        CULTURES:   UA:   No results for input(s): SPECGRAV, PHUR, COLORU, CLARITYU, MUCUS, PROTEINU, BLOODU, RBCUA, WBCUA, BACTERIA, NITRU, GLUCOSEU, BILIRUBINUR, UROBILINOGEN, KETUA, LABCAST, LABCASTTY, AMORPHOS in the last 72 hours.     Invalid input(s): CRYSTALS  Micro:   Lab Results   Component Value Date    BC No growth-preliminary No growth  03/17/2021          Problem list of patient:     Patient Active Problem List   Diagnosis Code    Neutropenic fever ([facility] [address] D70.9, R50.81    Septicemia ([facility] [address] A41.9    Altered mental status R41.82         ASSESSMENT/PLAN   Change in mental state: felt to be due to  shunt malfunctionL she had a new shunt Neutropenia : Improving  Fever : Better today. She needs therapy.     Romayne Carbine, MD, [address] 3/24/2021 5:11 PM

## 2021-03-24 NOTE — PROGRESS NOTES
Progress Note  Date:3/24/2021       Room:[address] [place] [address]  Patient Selma Barkley     Date of Birth:[birth year]     Age:60 y.o. Subjective    Subjective:  Symptoms:  Improved. No weakness. Diet:  Adequate intake. Activity level: Returning to normal.    Pain:  She reports no pain. Review of Systems   Neurological: Negative for weakness. Objective         Vitals Last 24 Hours:  TEMPERATURE:  Temp  Av.8 °F (37.1 °C)  Min: 97.9 °F (36.6 °C)  Max: 100.6 °F (38.1 °C)  RESPIRATIONS RANGE: Resp  Av  Min: 18  Max: 22  PULSE OXIMETRY RANGE: SpO2  Av.8 %  Min: 95 %  Max: 97 %  PULSE RANGE: Pulse  Av.5  Min: 70  Max: 85  BLOOD PRESSURE RANGE: Systolic (36QJF), NMU:403 , Min:116 , RILEY:285   ; Diastolic (45SAA), ZI, Min:47, Max:97    I/O (24Hr): Intake/Output Summary (Last 24 hours) at 3/24/2021 1511  Last data filed at 3/24/2021 1359  Gross per 24 hour   Intake 3021.11 ml   Output 1600 ml   Net 1421.11 ml     Objective:  General Appearance: In no acute distress and comfortable. Vital signs: (most recent): Blood pressure (!) 150/70, pulse 70, temperature 98.2 °F (36.8 °C), temperature source Oral, resp. rate 18, height 5' 6\" (1.676 m), weight 297 lb 8 oz (134.9 kg), SpO2 95 %. Vital signs are normal.    Output: Producing urine and producing stool. HEENT: Normal HEENT exam.    Lungs:  Normal effort and normal respiratory rate. Breath sounds clear to auscultation. Heart: Normal rate. Regular rhythm. Abdomen: Abdomen is soft. Neurological: Patient is alert and oriented to person, place and time. Normal strength.       Labs/Imaging/Diagnostics    Labs:  CBC:  Recent Labs     21  0346 21  0404 21  0437   WBC 1.2* 1.4* 2.0*   RBC 3.51* 3.54* 3.42*   HGB 11.2* 11.2* 10.8*   HCT 35.5* 35.2* 34.5*   .1* 99.4* 100.9*   PLT 89* 82* 56*     CHEMISTRIES:  Recent Labs     21  1224   *   K 4.3      CO2 22*   BUN 6*   CREATININE 0. 7   GLUCOSE 111*     PT/INR:No results for input(s): PROTIME, INR in the last 72 hours. APTT:No results for input(s): APTT in the last 72 hours. LIVER PROFILE:No results for input(s): AST, ALT, BILIDIR, BILITOT, ALKPHOS in the last 72 hours. Imaging Last 24 Hours:  Ct Head Wo Contrast    Result Date: 3/23/2021  PROCEDURE: CT HEAD WO CONTRAST CLINICAL INFORMATION:  shunt replacement. Altered mental status. COMPARISON: CT head dated 3/17/2021. TECHNIQUE: Noncontrast 5 mm axial images were obtained through the brain. Sagittal and coronal reconstructions were created. All CT scans at this facility use dose modulation, iterative reconstruction, and/or weight-based dosing when appropriate to reduce radiation dose to as low as reasonably achievable. FINDINGS: There has been interval revision of the right frontal approach ventriculostomy catheter with a new shunt reservoir and the new catheter again traversing the midline with tip now in the left basal ganglia. There is postsurgical air in the frontal horn and  temporal horn of the right lateral ventricle. Ventricular size is mildly decreased compared to previous exam. No extra-axial fluid collection is present. No intracranial hemorrhage is identified. There is again noted to be a coil pack in the anterior indicating artery region with prominent streak artifact. The calvarium otherwise appears intact. Orbits are unremarkable. There are stable mucous retention cysts in the maxillary sinuses. Mastoid air cells are clear. Interval right frontal approach shunt revision with catheter tip crossing the midline and tip in the left thalamus with postsurgical pneumocephalus in the right lateral ventricle with mild interval decrease in size of the ventricles compared to prior CT. **This report has been created using voice recognition software. It may contain minor errors which are inherent in voice recognition technology. ** Final report electronically signed by  Shari Thacker MD on 3/23/2021 10:45 AM    Assessment//Plan           Hospital Problems           Last Modified POA    Neutropenic fever ([address] 3/17/2021 Yes    Septicemia ([address] 3/19/2021 Yes    Altered mental status 3/19/2021 Yes        Assessment:  (Severe neutropenia,multifactorial.. Continue, Neupogen. ).        Electronically signed by Lainey Marie MD on 3/24/21 at 3:11 PM EDT

## 2021-03-24 NOTE — PROGRESS NOTES
Spoke with patient regarding PICC placement. Attempted to get consent signed patient states that she was told by son to not sign anything without him present. Attempted to call son with the help of primary nurse Jeni Penaloza. Jeni Penaloza RN spoke with patient's sister informed that son is out of the country at this time. Patient sister speaks with patient. Patient refuses to sign consent.

## 2021-03-24 NOTE — PROGRESS NOTES
[facility] 60  INPATIENT OCCUPATIONAL THERAPY  [facility] NEUROSCIENCES 4A  EVALUATION    Time:   Time In: 0810  Time Out: 2815  Timed Code Treatment Minutes: 23 Minutes  Minutes: 32          Date: 3/24/2021  Patient Name: Diamond Montoya,   Gender: female      MRN: [id number]  : 1960  (61 y.o.)  Referring Practitioner: KRIS Butcher  Diagnosis: Neutropenic Fever  Additional Pertinent Hx: per H&P:60 y.o. female with a past medical history spontaneous intraparenchymal intracranial hemorrhage due to aneurysm (s/p  shunt ), who presented to [address] [place] [address] with altered mental status, worsening over the last week. History is obtained from chart review and speaking with the ED provider as family left earlier in the evening. Patient does not respond to questions and mumbles to herself during exam.Per family, patient has had worsening neurological decline since December, now unable to walk. Last , she began having AMS without any obvious signs or symptoms of infection. She was seen by her neurologist, who is located near their [place] in [place], who had some concern about her shunt malfunctioning. Patient does open her eyes to sound, but does not respond in any other way. CBC was significant for neutropenia and repeated. WBCs were 0.4 and 0.3 on repeat. Associated with fever. Evidence of UTI. No other evidence of infection on imaging or skin exam.  Given vanc/zosyn in ED. Imaging on arrival at [facility] showed moderate ventriculomegaly, slightly progressed compared to imaging from 3/11/2020. Some subcortical white matter hypo-attenuation demonstrated at the level of he left frontal lobe, which could represent an age-indeterminate infarct. Some fluid noted in pelvis near shunt, could benefit from paracentesis and culture.  Shunt revision 3/22    Restrictions/Precautions:  Restrictions/Precautions: General Precautions, Fall Risk  Position Activity Restriction  Other position/activity restrictions: confusion, per brother pt has been NWB RLE from hx of ankle fx, pt was wearing a boot but hasn't recently had the boot discharged, brother assumes pt is still NWB RLE    Subjective  Patient assessed for rehabilitation services?: Yes  Family / Caregiver Present: No    Subjective: Pt lethargic and difficult to arouse    Pain:  Pain Assessment  Pain Assessment: Faces  Ramirez-Baker Pain Rating: Hurts a little bit      Social/Functional History:  Type of Home: Facility  Home Layout: One level           ADL Assistance: Needs assistance  Homemaking Responsibilities: No  Ambulation Assistance: Needs assistance  Transfer Assistance: Needs assistance          Additional Comments: per brother pt hasn't been ambulatory and requires assist for mobility at [facility]    VISION:WFL    HEARING:  WFL    COGNITION: Slow Processing, Decreased Recall, Decreased Insight, Impaired Memory, Decreased Problem Solving, Decreased Safety Awareness and Difficulty Following Commands; emotional liability with pt emotional when OTR re-oriented pt to situation, date, and time. Pt oriented to self. *difficulty following multi-step commands. RANGE OF MOTION:  Bilateral Upper Extremity:  Impaired - decreased d/t weakness    STRENGTH:  Bilateral Upper Extremity:  Impaired - deconditioning throughout    ADL:   Lower Extremity Dressing: Dependent. socks. BALANCE:  Sitting Balance:  Stand By Assistance, [address] initally Robbie, however, once pt had he bearings, she was able to maintain static/dynamic sitting with CGA-SBA <10 min    BED MOBILITY:  Supine to Sit: Maximum Assistance of 1  Sit to Supine: Maximum Assistance of 1  Scooting: Dependent to scoot higher in bed with hercules mattress      Exercise:  Pt completed BUE AROM exercises x 10 reps x 1 set/s in all tolerable joints/planes. and All exercises completed to increase strength and endurance required for ADLs.      Activity Tolerance:  Patient tolerance of  treatment: fair. Assessment:    Pt s/p admission for neutropenic fever. Pt exhibiting deficits detailed below, requiring additional OT intervention to restore PLOF. Pt now requiring assist for ADLs, mobility, t/fs, and standing balance. Pt with significant increase in burden of care. Without skilled OT intervention pt at risk for functional decline, increased falls, and readmission to hospital.    Performance deficits / Impairments: Decreased endurance, Decreased ADL status, Decreased balance, Decreased strength, Decreased cognition, Decreased safe awareness, Decreased coordination  Prognosis: Fair  REQUIRES OT FOLLOW UP: Yes    Treatment Initiated: Treatment and education initiated within context of evaluation. Evaluation time included review of current medical information, gathering information related to past medical, social and functional history, completion of standardized testing, formal and informal observation of tasks, assessment of data and development of plan of care and goals. Treatment time included skilled education and facilitation of tasks to increase safety and independence with ADL's for improved functional independence and quality of life. Discharge Recommendations:  24 hour supervision or assist    Patient Education:  OT Education: OT Role, Plan of Care, Precautions    Equipment Recommendations:  Equipment Needed: No  Other: defer to next level of care    Plan:  Times per week: 3-5x  Current Treatment Recommendations: Strengthening, Balance Training, Endurance Training, Safety Education & Training, Patient/Caregiver Education & Training, Cognitive Reorientation. See long-term goal time frame for expected duration of plan of care. If no long-term goals established, a short length of stay is anticipated.     Goals:     Short term goals  Time Frame for Short term goals: by discharge  Short term goal 1: Pt will tolerate dynamic sitting x 10 min with S and 2 hand release to increase balance required for ADLs  Short term goal 2: Pt will follow 75% of commands to increase indep with self care routine. Short term goal 3: Pt will participate in further  strengthening and coordination assessment to increase ability to open containers during meals. Short term goal 4: Pt will complete grooming task with S to increase indep with self care. Following session, patient left in safe position with all fall risk precautions in place.

## 2021-03-24 NOTE — PROGRESS NOTES
Spoke with Nils Salazar and Dr Didi Corley regarding PICC order. Patient has a platelet count of 56 today 3/24/21 increase risk for bleeding. Dr Didi Corley and Nils Salazar RN will check patient labs to clarify baseline. Will wait to place PICC line.

## 2021-03-25 LAB
ANION GAP SERPL CALCULATED.3IONS-SCNC: 7 MEQ/L (ref 8–16)
BASOPHILIA: ABNORMAL
BASOPHILIC STIPPLING: ABNORMAL
BASOPHILS # BLD: 0.8 %
BASOPHILS ABSOLUTE: 0 THOU/MM3 (ref 0–0.1)
BUN BLDV-MCNC: 4 MG/DL (ref 7–22)
CALCIUM SERPL-MCNC: 9 MG/DL (ref 8.5–10.5)
CHLORIDE BLD-SCNC: 101 MEQ/L (ref 98–111)
CO2: 27 MEQ/L (ref 23–33)
CREAT SERPL-MCNC: 0.8 MG/DL (ref 0.4–1.2)
EOSINOPHIL # BLD: 0 %
EOSINOPHILS ABSOLUTE: 0 THOU/MM3 (ref 0–0.4)
ERYTHROCYTE [DISTWIDTH] IN BLOOD BY AUTOMATED COUNT: 14.8 % (ref 11.5–14.5)
ERYTHROCYTE [DISTWIDTH] IN BLOOD BY AUTOMATED COUNT: 55.2 FL (ref 35–45)
GFR SERPL CREATININE-BSD FRML MDRD: 73 ML/MIN/1.73M2
GLUCOSE BLD-MCNC: 94 MG/DL (ref 70–108)
HCT VFR BLD CALC: 33.3 % (ref 37–47)
HEMOGLOBIN: 10.6 GM/DL (ref 12–16)
IMMATURE GRANS (ABS): 0.03 THOU/MM3 (ref 0–0.07)
IMMATURE GRANULOCYTES: 1.1 %
LYMPHOCYTES # BLD: 28.6 %
LYMPHOCYTES ABSOLUTE: 0.7 THOU/MM3 (ref 1–4.8)
MAGNESIUM: 1.7 MG/DL (ref 1.6–2.4)
MCH RBC QN AUTO: 31.9 PG (ref 26–33)
MCHC RBC AUTO-ENTMCNC: 31.8 GM/DL (ref 32.2–35.5)
MCV RBC AUTO: 100.3 FL (ref 81–99)
MONOCYTES # BLD: 39.7 %
MONOCYTES ABSOLUTE: 1 THOU/MM3 (ref 0.4–1.3)
NUCLEATED RED BLOOD CELLS: 0 /100 WBC
PLATELET # BLD: 50 THOU/MM3 (ref 130–400)
PLATELET ESTIMATE: ABNORMAL
PMV BLD AUTO: 13 FL (ref 9.4–12.4)
POTASSIUM SERPL-SCNC: 2.8 MEQ/L (ref 3.5–5.2)
POTASSIUM SERPL-SCNC: 3.2 MEQ/L (ref 3.5–5.2)
RBC # BLD: 3.32 MILL/MM3 (ref 4.2–5.4)
SCAN OF BLOOD SMEAR: NORMAL
SEG NEUTROPHILS: 29.8 %
SEGMENTED NEUTROPHILS ABSOLUTE COUNT: 0.8 THOU/MM3 (ref 1.8–7.7)
SODIUM BLD-SCNC: 135 MEQ/L (ref 135–145)
VANCOMYCIN TROUGH: 18.1 UG/ML (ref 10–20)
WBC # BLD: 2.6 THOU/MM3 (ref 4.8–10.8)

## 2021-03-25 PROCEDURE — 83735 ASSAY OF MAGNESIUM: CPT

## 2021-03-25 PROCEDURE — 6360000002 HC RX W HCPCS: Performed by: PHYSICIAN ASSISTANT

## 2021-03-25 PROCEDURE — 6370000000 HC RX 637 (ALT 250 FOR IP): Performed by: PHYSICIAN ASSISTANT

## 2021-03-25 PROCEDURE — 36415 COLL VENOUS BLD VENIPUNCTURE: CPT

## 2021-03-25 PROCEDURE — 2580000003 HC RX 258: Performed by: INTERNAL MEDICINE

## 2021-03-25 PROCEDURE — 97110 THERAPEUTIC EXERCISES: CPT

## 2021-03-25 PROCEDURE — 80048 BASIC METABOLIC PNL TOTAL CA: CPT

## 2021-03-25 PROCEDURE — 93005 ELECTROCARDIOGRAM TRACING: CPT | Performed by: FAMILY MEDICINE

## 2021-03-25 PROCEDURE — 84132 ASSAY OF SERUM POTASSIUM: CPT

## 2021-03-25 PROCEDURE — 2580000003 HC RX 258: Performed by: PHYSICIAN ASSISTANT

## 2021-03-25 PROCEDURE — 85025 COMPLETE CBC W/AUTO DIFF WBC: CPT

## 2021-03-25 PROCEDURE — 80202 ASSAY OF VANCOMYCIN: CPT

## 2021-03-25 PROCEDURE — 2060000000 HC ICU INTERMEDIATE R&B

## 2021-03-25 PROCEDURE — 6370000000 HC RX 637 (ALT 250 FOR IP): Performed by: FAMILY MEDICINE

## 2021-03-25 PROCEDURE — 97530 THERAPEUTIC ACTIVITIES: CPT

## 2021-03-25 PROCEDURE — 6360000002 HC RX W HCPCS: Performed by: INTERNAL MEDICINE

## 2021-03-25 PROCEDURE — 2580000003 HC RX 258: Performed by: FAMILY MEDICINE

## 2021-03-25 PROCEDURE — 99232 SBSQ HOSP IP/OBS MODERATE 35: CPT | Performed by: FAMILY MEDICINE

## 2021-03-25 PROCEDURE — 6360000002 HC RX W HCPCS: Performed by: FAMILY MEDICINE

## 2021-03-25 RX ADMIN — SODIUM CHLORIDE, PRESERVATIVE FREE 10 ML: 5 INJECTION INTRAVENOUS at 11:02

## 2021-03-25 RX ADMIN — SODIUM CHLORIDE, PRESERVATIVE FREE 10 ML: 5 INJECTION INTRAVENOUS at 21:20

## 2021-03-25 RX ADMIN — MAGNESIUM GLUCONATE 500 MG ORAL TABLET 400 MG: 500 TABLET ORAL at 00:16

## 2021-03-25 RX ADMIN — LOSARTAN POTASSIUM 100 MG: 100 TABLET, FILM COATED ORAL at 08:34

## 2021-03-25 RX ADMIN — POTASSIUM CHLORIDE 10 MEQ: 7.46 INJECTION, SOLUTION INTRAVENOUS at 19:03

## 2021-03-25 RX ADMIN — POTASSIUM CHLORIDE 10 MEQ: 7.46 INJECTION, SOLUTION INTRAVENOUS at 16:53

## 2021-03-25 RX ADMIN — Medication 1250 MG: at 01:40

## 2021-03-25 RX ADMIN — SODIUM CHLORIDE, PRESERVATIVE FREE 10 ML: 5 INJECTION INTRAVENOUS at 08:34

## 2021-03-25 RX ADMIN — POTASSIUM CHLORIDE 10 MEQ: 7.46 INJECTION, SOLUTION INTRAVENOUS at 10:57

## 2021-03-25 RX ADMIN — LEVETIRACETAM 1000 MG: 100 INJECTION INTRAVENOUS at 06:22

## 2021-03-25 RX ADMIN — PIPERACILLIN AND TAZOBACTAM 3375 MG: 3; .375 INJECTION, POWDER, LYOPHILIZED, FOR SOLUTION INTRAVENOUS at 21:20

## 2021-03-25 RX ADMIN — POTASSIUM CHLORIDE 10 MEQ: 7.46 INJECTION, SOLUTION INTRAVENOUS at 20:54

## 2021-03-25 RX ADMIN — NYSTATIN 500000 UNITS: 100000 SUSPENSION ORAL at 08:34

## 2021-03-25 RX ADMIN — Medication 1250 MG: at 14:32

## 2021-03-25 RX ADMIN — PIPERACILLIN AND TAZOBACTAM 3375 MG: 3; .375 INJECTION, POWDER, LYOPHILIZED, FOR SOLUTION INTRAVENOUS at 12:34

## 2021-03-25 RX ADMIN — ATORVASTATIN CALCIUM 20 MG: 20 TABLET, FILM COATED ORAL at 21:20

## 2021-03-25 RX ADMIN — ACETAMINOPHEN 650 MG: 325 TABLET ORAL at 08:42

## 2021-03-25 RX ADMIN — AMLODIPINE BESYLATE 5 MG: 5 TABLET ORAL at 08:34

## 2021-03-25 RX ADMIN — POTASSIUM CHLORIDE 40 MEQ: 1500 TABLET, EXTENDED RELEASE ORAL at 23:54

## 2021-03-25 RX ADMIN — SENNOSIDES 8.6 MG: 8.6 TABLET, FILM COATED ORAL at 08:34

## 2021-03-25 RX ADMIN — RISPERIDONE 0.5 MG: 0.25 TABLET ORAL at 21:20

## 2021-03-25 RX ADMIN — RISPERIDONE 0.5 MG: 0.25 TABLET ORAL at 08:34

## 2021-03-25 RX ADMIN — PIPERACILLIN AND TAZOBACTAM 3375 MG: 3; .375 INJECTION, POWDER, LYOPHILIZED, FOR SOLUTION INTRAVENOUS at 04:05

## 2021-03-25 RX ADMIN — LEVETIRACETAM 1000 MG: 500 TABLET, FILM COATED ORAL at 21:20

## 2021-03-25 RX ADMIN — POTASSIUM CHLORIDE 10 MEQ: 7.46 INJECTION, SOLUTION INTRAVENOUS at 14:34

## 2021-03-25 RX ADMIN — MAGNESIUM SULFATE HEPTAHYDRATE 1000 MG: 500 INJECTION, SOLUTION INTRAMUSCULAR; INTRAVENOUS at 14:30

## 2021-03-25 RX ADMIN — POTASSIUM CHLORIDE 10 MEQ: 7.46 INJECTION, SOLUTION INTRAVENOUS at 12:33

## 2021-03-25 RX ADMIN — SENNOSIDES 8.6 MG: 8.6 TABLET, FILM COATED ORAL at 21:20

## 2021-03-25 RX ADMIN — MAGNESIUM GLUCONATE 500 MG ORAL TABLET 400 MG: 500 TABLET ORAL at 21:20

## 2021-03-25 RX ADMIN — MAGNESIUM GLUCONATE 500 MG ORAL TABLET 400 MG: 500 TABLET ORAL at 08:34

## 2021-03-25 ASSESSMENT — PAIN SCALES - WONG BAKER
WONGBAKER_NUMERICALRESPONSE: 0

## 2021-03-25 ASSESSMENT — PAIN SCALES - GENERAL
PAINLEVEL_OUTOF10: 8
PAINLEVEL_OUTOF10: 0
PAINLEVEL_OUTOF10: 8

## 2021-03-25 ASSESSMENT — PAIN DESCRIPTION - PAIN TYPE: TYPE: SURGICAL PAIN

## 2021-03-25 NOTE — PROGRESS NOTES
[facility] THERAPY MISSED TREATMENT NOTE  STR NEUROSCIENCES 4A  4A-16/016-A      Date: 3/25/2021  Patient Name: Magui Speaker        CSN: [id number]   : 1960  (61 y.o.)  Gender: female   Referring Practitioner: KRIS Shannon  Diagnosis: Neutropenic Fever         REASON FOR MISSED TREATMENT: Per discussion with RN, pt is about to have PICC placed, will hold for now and check back as time allows.

## 2021-03-25 NOTE — PLAN OF CARE
Problem: Nutrition  Goal: Optimal nutrition therapy  3/25/2021 1519 by Sudhir Osuna RD, LD  Outcome: Ongoing   Nutrition Problem #1: Inadequate oral intake  Intervention: Food and/or Nutrient Delivery: Continue Current Diet, Start Oral Nutrition Supplement  Nutritional Goals: Patient will safely consume 75% or more of meals during LOS.

## 2021-03-25 NOTE — PROGRESS NOTES
Hospitalist Progress Note      Patient:  [facility]    Unit/Bed:4A-16/016-A  YOB: 1960  MRN: [id number]   Acct: [de-identified]   PCP: Frank Darden MD  Date of Admission: 3/17/2021      Chief Complaint: AMS    Initial H and P:-    \"61 y.o. 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 History is obtained from chart review and speaking with the ED provider as family left earlier in the evening. Patient does not respond to questions and mumbles to herself during exam.     Per family, patient has had worsening neurological decline since December, now unable to walk. Last Sunday, she began having AMS without any obvious signs or symptoms of infection. She was seen by her neurologist, who is [place] in [place], who had some concern about her shunt malfunctioning. Patient does open her eyes to sound, but does not respond in any other way.     CBC was significant for neutropenia and repeated. WBCs were 0.4 and 0.3 on repeat. Associated with fever. Evidence of UTI. No other evidence of infection on imaging or skin exam.  Given vanc/zosyn in ED.     Imaging on arrival at [facility] showed moderate ventriculomegaly, slightly progressed compared to imaging from 3/11/2020. Some subcortical white matter hypo-attenuation demonstrated at the level of he left frontal lobe, which could represent an age-indeterminate infarct. Some fluid noted in pelvis near shunt, could benefit from paracentesis and culture. \"    3/19: No acute events overnight. Per SW who spoke with NH, nursing home was speaking to family about hospice. Pt is following commands. Pt does not give good conversation. Pt not complaining of pain. 3/20: No acute events overnight.  Pt is alert but lethargic. Shuntogram showed possible occulusion. 3/21: No acute events overnight. Pt is more alert and talkative today. Pt understands that she will undergo surgery tomorrow. No issues or complaints. 3/22: No acute events overnight. Pt underwent surgery this AM for  shunt replacement, pt tolerated surgery well. No issues or complaints. Pt more awake. Subjective (past 24 hours):     Patient seen and examined. Pt is awake during my rounds. She denies HA, blurry vision, focal weakness/numbness, chest pain, sob. She reports mild abd discomfort, denies N/V. She's not sure if she has diarrhea.      Medications:  Reviewed    Infusion Medications     Scheduled Medications    magnesium sulfate  1,000 mg Intravenous Once    lidocaine 1 % injection  5 mL Intradermal Once    sodium chloride flush  10 mL Intravenous 2 times per day    magnesium oxide  400 mg Oral BID    lidocaine 1 % injection  5 mL Intradermal Once    sodium chloride flush  10 mL Intravenous 2 times per day    vancomycin  1,250 mg Intravenous Q12H    vancomycin (VANCOCIN) intermittent dosing (placeholder)   Other RX Placeholder    [Held by provider] enoxaparin  40 mg Subcutaneous Q12H    amLODIPine  5 mg Oral Daily    atorvastatin  20 mg Oral Nightly    losartan  100 mg Oral Daily    risperiDONE  0.5 mg Oral BID    senna  1 tablet Oral BID    [Held by provider] levETIRAcetam  1,000 mg Oral BID    calcium replacement protocol   Other RX Placeholder    piperacillin-tazobactam  3,375 mg Intravenous Q8H    nystatin  5 mL Mouth/Throat 4x Daily    levetiracetam  1,000 mg Intravenous Q12H     PRN Meds: sodium chloride flush, hydrALAZINE, sodium chloride flush, promethazine **OR** ondansetron, HYDROcodone 5 mg - acetaminophen, HYDROmorphone, acetaminophen, potassium chloride **OR** potassium alternative oral replacement **OR** potassium chloride, polyethylene glycol, simethicone      Intake/Output Summary (Last 24 hours) at 3/25/2021 1055  Last data filed at 3/25/2021 0318  Gross per 24 hour   Intake 1685.15 ml   Output 1700 ml   Net -14.85 ml       Diet:  DIET DYSPHAGIA PUREED; Low Microbial, Lactose Controlled    Exam:  BP (!) 153/81   Pulse 65   Temp 98.6 °F (37 °C) (Oral)   Resp 16   Ht 5' 6\" (1.676 m)   Wt (!) 302 lb 4.8 oz (137.1 kg)   SpO2 100%   BMI 48.79 kg/m²     General appearance: Chronically ill appearing white female  HEENT: Pupils equal, round, and reactive to light. Conjunctivae clear. Head wrapped in ACE, dressing c/d/i. Neck: Supple, with full range of motion. No jugular venous distention. Trachea midline. Respiratory:  Normal respiratory effort on RA. Diminished air entry on lung bases, no rales, no rhonchi, no wheezing   Cardiovascular: normal rate, regular rhythm with normal S1/S2 without murmurs, rubs or gallops. Abdomen: Soft, non-tender, non-distended with normal bowel sounds. Musculoskeletal: passive and active ROM x 4 extremities. Skin: No rashes or lesions. Neurological exam reveals alert, oriented x 2 ( knows name and bday, she does not know where she is), normal speech, no focal findings or movement disorder noted, cranial nerves II through XII intact, motor and sensory grossly normal bilaterally. Exam of extremities: peripheral pulses normal, (+) trace pitting bipedal edema, (+) grade 1 pitting edema on both legs ( from below knees to distal legs), no clubbing or cyanosis.  SCD in placed on both legs          Labs:   Recent Labs     03/23/21  0404 03/24/21  0437 03/25/21  0417   WBC 1.4* 2.0* 2.6*   HGB 11.2* 10.8* 10.6*   HCT 35.2* 34.5* 33.3*   PLT 82* 56* 50*     Recent Labs     03/23/21  1224 03/24/21  1455 03/25/21  0417   * 136 135   K 4.3 3.1* 2.8*    102 101   CO2 22* 24 27   BUN 6* 6* 4*   CREATININE 0.7 0.7 0.8   CALCIUM 8.8 9.4 9.0     Recent Labs     03/24/21  1455   AST 39   ALT 28   BILIDIR <0.2   BILITOT 0.5   ALKPHOS 245*     Microbiology:    Blood culture #1: Lab Results   Component Value Date    BC No growth-preliminary No growth  03/17/2021     Urinalysis:      Lab Results   Component Value Date    NITRU NEGATIVE 03/17/2021    WBCUA 10-15 03/17/2021    BACTERIA MANY 03/17/2021    RBCUA 3-5 03/17/2021    BLOODU NEGATIVE 03/17/2021    GLUCOSEU NEGATIVE 03/17/2021       Radiology:  CT HEAD WO CONTRAST   Final Result    Interval right frontal approach shunt revision with catheter tip crossing the midline and tip in the left thalamus with postsurgical pneumocephalus in the right lateral ventricle with mild interval decrease in size of the ventricles compared to prior    CT. **This report has been created using voice recognition software. It may contain minor errors which are inherent in voice recognition technology. **      Final report electronically signed by Dr. Dixon Adair MD on 3/23/2021 10:45 AM      RADIOLOGY REPORT   Final Result      NM CSF SHUNT EVALUATION   Final Result      Absence of activity in distal shunt catheter tubing and in the pelvis, highly suspicious for occlusion. Final report electronically signed by Dr. Prem Fajardo on 3/19/2021 4:01 PM      CT CHEST WO CONTRAST   Final Result   1. No acute intrathoracic findings. 2.  Minimal consolidation along the right lung base posteriorly, likely    representing atelectasis. This document has been electronically signed by: Nereyda Le MD on    03/17/2021 11:10 PM      All CTs at this facility use dose modulation techniques and iterative    reconstructions, and/or weight-based dosing   when appropriate to reduce radiation to a low as reasonably achievable. CT ABDOMEN PELVIS WO CONTRAST Additional Contrast? None   Final Result   1. No acute intra-abdominal or pelvic findings. No bowel obstruction. No renal obstruction. 2.  Small amount of fluid present within the lower abdomen/pelvis.   This    is likely secondary to the ventriculoperitoneal shunt which is in close    proximity to this region. This document has been electronically signed by: Robinson Amos MD on    03/17/2021 11:03 PM      All CTs at this facility use dose modulation techniques and iterative    reconstructions, and/or weight-based dosing   when appropriate to reduce radiation to a low as reasonably achievable. XR SHUNT SERIES PLACEMENT (<4 VIEWS)   Final Result   1. There is a right-sided  shunt catheter. The visualized portions of the catheter. Intact. However, there is partial exclusion of the inferior aspect of the catheter overlying the lower pelvis. 2. There are mildly dilated gas-filled small bowel loops overlying the mid abdomen. Differential considerations would include a possible focal ileus or partial small bowel obstruction. Consider further characterization with CT. **This report has been created using voice recognition software. It may contain minor errors which are inherent in voice recognition technology. **      Final report electronically signed by Dr. Samuel Olivera on 3/17/2021 8:39 PM      CT HEAD WO CONTRAST   Final Result   1. No acute intracranial hemorrhage or mass effect. 2. There is subcortical white matter hypoattenuation demonstrated at the level of the left frontal lobe as seen on axial images 20/7/2029. This may represent an age-indeterminate infarct. Although a remote infarct is favored given the appearance of    encephalomalacia on coronal series image 22.      3. There is moderate ventriculomegaly demonstrated. The overall degree of ventriculomegaly appears slightly progressed at the level of the anterior left lateral ventricle when compared to the prior examination dated 3/11/2020.      4. There is an anterior rightward approach ventricular peritoneal shunt catheter with the distal tip abutting the septum pellucidum on axial image 20. This is similar in position when compared to prior examination.  This slightly deviates the septum    pellucidum towards the left by approximately 4 mm. However, there is no evidence of midline shift at the level of the pineal gland. **This report has been created using voice recognition software. It may contain minor errors which are inherent in voice recognition technology. **      Final report electronically signed by Dr. Domingo Lundberg on 3/17/2021 8:34 PM          Assessment/Plan:      1.  Shunt Malfucntion: s/p revision  shunt placement on 3/22/2021. Neurosurgery & General Surgery together on surgery. Appreciate specialists input. Dressing c/d/i. Neuro checks per protocol. 2. Acute encephalopathy: Likely due to  shunt malfunction. Will continue to monitor post-operatively. Neuro checks. Pt is waxing and waning with mentation. More awake today  3. Sepsis, unknown etiology, resolving: Urine culture contaminant & Blood cultures NG. CSF showed no growth. IVF. Continue IV ABX. On 3/22 night, patient spiked a fever of 102. Tylenol given. Leukopenia improving. lactate normal. Continue IV vanco  and Zosyn ( start date 3/17/2021 for both abx). ID on-board. I spoke with Dr. Alex Melo (ID) on-board, who recommend switch IV abx to Augmentin 875 mg PO BID and Bactrim DS BID for 1 week on discharge. Appreciate ID input. 4. History of ruptured brain aneurysm status post  shunt 2019:  Neurosurgery on-board. Shuntogram shows high suspicious for occlusion, POD1. Neurosurgery & General Surgery consulted. 5. Severe Neutropenia with fever, improving: Smudge cells. Afebrile since 3/23/2021. Hematology on-board, appreciate hematology input. 6. Pancytopenia: Hematology started Granix. Appreciate hematology input. Leukopenia improving, anemia stable, thrombocytopenia slightly worsening. Cont to hold lovenox. Cont SCD for DVT prophylaxis  7. Elevated LFTs, improving: LFTs are elevated. No known baseline. LFTs still elevated but stable. CT A/P did not any acute issues of liver. 8. Dysphagia: cont dysphagia diet.  ST on-board, appreciate input. 9. Moderate expressive/receptive language impairment: ST on-board  10. Hyponatremia, mild, resolved, resolved   11. B/L LE edema: DC IV fluids  12. Hypokalemia, worsening: unclear etiology. Potassium Replacement Protocol. Magnesium 1.7. Started magnesium oxide oral on 3/24/2021, give 1 dose of Magnesium 1 gram IV today. Check EKG. Tele   13. Hypoalbuminemia/low BUN, rule out malnutrition: Consult dietitian  14. History of seizures: cont Keppra IV  15. Essential hypertension: BP fluctuates. Continue home medications losartan and amlodipine for now, re-assess tomorrow. Continue IV hydralazine as needed  16. Hyperlipidemia: Continue statin.        Disposition: Plan is return to [address] [place] [address] once medically stable       Electronically signed by Yusuf Hernandez MD on 3/25/2021 at 10:55 AM

## 2021-03-25 NOTE — PROGRESS NOTES
[address]  Occupational Therapy  Daily Note  Time In: 9055  Time Out: 1135  Timed Code Treatment Minutes: 27 Minutes  Minutes: 27          Date: 3/25/2021  Patient Name: Juanis Ann,   Gender: female      Room: -16/016-A  MRN: [id number]  : 1960  (61 y.o.)  Referring Practitioner: KRIS Atkins  Diagnosis: Neutropenic Fever  Additional Pertinent Hx: per H&P:60 y.o. female with a past medical history spontaneous intraparenchymal intracranial hemorrhage due to aneurysm (s/p  shunt ), who presented to [address] [place] [address] with altered mental status, worsening over the last week. History is obtained from chart review and speaking with the ED provider as family left earlier in the evening. Patient does not respond to questions and mumbles to herself during exam.Per family, patient has had worsening neurological decline since December, now unable to walk. Last , she began having AMS without any obvious signs or symptoms of infection. She was seen by her neurologist, who is located near their [place] in [place], who had some concern about her shunt malfunctioning. Patient does open her eyes to sound, but does not respond in any other way. CBC was significant for neutropenia and repeated. WBCs were 0.4 and 0.3 on repeat. Associated with fever. Evidence of UTI. No other evidence of infection on imaging or skin exam.  Given vanc/zosyn in ED. Imaging on arrival at [facility] showed moderate ventriculomegaly, slightly progressed compared to imaging from 3/11/2020. Some subcortical white matter hypo-attenuation demonstrated at the level of he left frontal lobe, which could represent an age-indeterminate infarct. Some fluid noted in pelvis near shunt, could benefit from paracentesis and culture.  Shunt revision 3/22    Restrictions/Precautions:  Restrictions/Precautions: General Precautions, Fall Risk  Position Activity Restriction  Other position/activity restrictions: confusion, per brother pt has been NWB RLE from hx of ankle fx, pt was wearing a boot but hasn't recently had the boot discharged, brother assumes pt is still NWB RLE     SUBJECTIVE: RN okayed session. Pt was supine in bed upon arrival. Pt was agreeable to OT with min encouragement. Pt became tearful throughout session, reporting she often feels depressed. PAIN: Denies    Vitals: Vitals not assessed per clinical judgement, see nursing flowsheet     9 Hole Peg:        R: 92sec        L: 73sec     Strength (Handle Setting 2):         R: 8, 10, 10, Av.3        L: 12, 12, 10, Av.3      COGNITION: Slow Processing, Decreased Recall, Decreased Insight, Impaired Memory, Decreased Problem Solving, Decreased Safety Awareness and Impulsive    ADL:   Feeding: with set-up. drinking water after lunch arrived to room. BALANCE:  Sitting Balance:  Stand By Assistance. on EOB. Pt tolerated sitting on EOB for 3 min. Pt with increased c/o dizzinss throughout. VC for use of breathing techniques. Pt reported feeling a little better, but requesting to lay down. BED MOBILITY:  Supine to Sit: Moderate Assistance to guide BLE off of bed, with HOB raised and increased time for completion  Sit to Supine: Moderate Assistance to guide BLE onto bed, with min VC for technique  Scooting: Moderate Assistance to scoot towards EOB    TRANSFERS:  Not Tested    FUNCTIONAL MOBILITY:  Not Tested    ASSESSMENT:     Activity Tolerance:  Patient tolerance of  treatment: fair.  Limited by anxiety and dizziness      Discharge Recommendations: 24 hour supervision or assist    Equipment Recommendations: Equipment Needed: No  Other: defer to next level of care  Plan: Times per week: 3-5x  Current Treatment Recommendations: Strengthening, Balance Training, Endurance Training, Safety Education & Training, Patient/Caregiver Education & Training, Cognitive Reorientation    Patient Education  Patient Education:

## 2021-03-25 NOTE — PROGRESS NOTES
Progress note: Infectious diseases    Patient - Vance Al,  Age - 61 y.o.    - 1960      Room Number - 8R-99/710-R   MRN -  [id number]   Acct # - [de-identified]  Date of Admission -  3/17/2021  6:41 PM    SUBJECTIVE:   No new issues. OBJECTIVE   VITALS    height is 5' 6\" (1.676 m) and weight is 302 lb 4.8 oz (137.1 kg) (abnormal). Her axillary temperature is 98.8 °F (37.1 °C). Her blood pressure is 147/84 (abnormal) and her pulse is 62. Her respiration is 18 and oxygen saturation is 98%.        Wt Readings from Last 3 Encounters:   21 (!) 302 lb 4.8 oz (137.1 kg)   20 (!) 331 lb 9.6 oz (150.4 kg)       I/O (24 Hours)    Intake/Output Summary (Last 24 hours) at 3/25/2021 1316  Last data filed at 3/25/2021 0318  Gross per 24 hour   Intake 615.85 ml   Output 1700 ml   Net -1084.15 ml       General Appearance : clean scalp wound  HEENT - normocephalic, atraumatic, pink conjunctiva,  anicteric sclera  Neck - Supple, no mass  Lungs -  Bilateral   air entry, + rhonchi,    Cardiovascular - Heart sounds are normal.    Abdomen - soft, not distended, nontender,   Neurologic -awake, able to make conversation  Skin - diffuse macular rash, dry skin and scaly  Extremities - chronic leg edema    MEDICATIONS:      magnesium sulfate  1,000 mg Intravenous Once    lidocaine 1 % injection  5 mL Intradermal Once    sodium chloride flush  10 mL Intravenous 2 times per day    magnesium oxide  400 mg Oral BID    lidocaine 1 % injection  5 mL Intradermal Once    sodium chloride flush  10 mL Intravenous 2 times per day    vancomycin  1,250 mg Intravenous Q12H    vancomycin (VANCOCIN) intermittent dosing (placeholder)   Other RX Placeholder    [Held by provider] enoxaparin  40 mg Subcutaneous Q12H    amLODIPine  5 mg Oral Daily    atorvastatin  20 mg Oral Nightly    losartan  100 mg Oral Daily    risperiDONE  0.5 mg Oral BID    senna  1 tablet Oral BID    levETIRAcetam  1,000 mg Oral BID    calcium replacement protocol   Other RX Placeholder    piperacillin-tazobactam  3,375 mg Intravenous Q8H    nystatin  5 mL Mouth/Throat 4x Daily       sodium chloride flush, hydrALAZINE, sodium chloride flush, promethazine **OR** ondansetron, HYDROcodone 5 mg - acetaminophen, HYDROmorphone, acetaminophen, potassium chloride **OR** potassium alternative oral replacement **OR** potassium chloride, polyethylene glycol, simethicone      LABS:     CBC:   Recent Labs     03/23/21  0404 03/24/21  0437 03/25/21  0417   WBC 1.4* 2.0* 2.6*   HGB 11.2* 10.8* 10.6*   PLT 82* 56* 50*     BMP:    Recent Labs     03/23/21  1224 03/24/21  1455 03/25/21  0417   * 136 135   K 4.3 3.1* 2.8*    102 101   CO2 22* 24 27   BUN 6* 6* 4*   CREATININE 0.7 0.7 0.8   GLUCOSE 111* 98 94     Calcium:  Recent Labs     03/25/21  0417   CALCIUM 9.0     Ionized Calcium:No results for input(s): IONCA in the last 72 hours. Magnesium:  Recent Labs     03/25/21  0417   MG 1.7    INR:   No results for input(s): INR in the last 72 hours. Hepatic:   Recent Labs     03/24/21  1455   ALKPHOS 245*   ALT 28   AST 39   PROT 6.9   BILITOT 0.5   BILIDIR <0.2   LABALBU 2.3*         Problem list of patient:     Patient Active Problem List   Diagnosis Code    Neutropenic fever ([facility] D70.9, R50.81    Sepsis without acute organ dysfunction ([facility] A41.9    Altered mental status R41.82    S/P  shunt Z98.2    Acute encephalopathy G93.40    Pancytopenia (HCC) D61.818    Elevated LFTs R79.89    Bilateral lower extremity edema R60.0    Hypokalemia E87.6    Hypoalbuminemia E88.09    Low BUN R79.89    Hx of seizure disorder Z86.69    Essential hypertension I10    Hyperlipidemia E78.5         ASSESSMENT/PLAN   Change in mental state: felt to be due to  shunt malfunctionL she had a new shunt. Slowly getting better. Neutropenia : Improving  Fever : better.   Will stop iv antibiotic on discharge  Kj Pizarro MD, FACP 3/25/2021 1:16 PM

## 2021-03-25 NOTE — PROGRESS NOTES
Progress Note  Date:3/25/2021       [facility]:7C-78/552-E  Patient Deborah Longoria     Date of Birth:[birth year]     Age:60 y.o. Subjective    Subjective:  Symptoms:  Improved. She reports malaise. Diet:  Adequate intake. Activity level: Returning to normal.    Pain:  She reports no pain. Review of Systems  Objective         Vitals Last 24 Hours:  TEMPERATURE:  Temp  Av.4 °F (36.9 °C)  Min: 97.9 °F (36.6 °C)  Max: 98.8 °F (37.1 °C)  RESPIRATIONS RANGE: Resp  Av.7  Min: 16  Max: 18  PULSE OXIMETRY RANGE: SpO2  Av.7 %  Min: 93 %  Max: 100 %  PULSE RANGE: Pulse  Av.7  Min: 62  Max: 80  BLOOD PRESSURE RANGE: Systolic (26DCG), PGM:246 , Min:113 , KDK:273   ; Diastolic (96JLN), MOV:34, Min:56, Max:84    I/O (24Hr): Intake/Output Summary (Last 24 hours) at 3/25/2021 1250  Last data filed at 3/25/2021 0318  Gross per 24 hour   Intake 615.85 ml   Output 1700 ml   Net -1084.15 ml     Objective:  General Appearance: In no acute distress and comfortable. Vital signs: (most recent): Blood pressure (!) 147/84, pulse 62, temperature 98.8 °F (37.1 °C), temperature source Axillary, resp. rate 18, height 5' 6\" (1.676 m), weight (!) 302 lb 4.8 oz (137.1 kg), SpO2 98 %. Vital signs are normal.    Output: Producing urine and producing stool. HEENT: Normal HEENT exam.    Lungs:  Normal effort and normal respiratory rate. Breath sounds clear to auscultation. Heart: Normal rate. Regular rhythm. Abdomen: Abdomen is soft. Neurological: Patient is alert and oriented to person, place and time. Normal strength.       Labs/Imaging/Diagnostics    Labs:  CBC:  Recent Labs     21  0404 21  0437 21  0417   WBC 1.4* 2.0* 2.6*   RBC 3.54* 3.42* 3.32*   HGB 11.2* 10.8* 10.6*   HCT 35.2* 34.5* 33.3*   MCV 99.4* 100.9* 100.3*   PLT 82* 56* 50*     CHEMISTRIES:  Recent Labs     21  1224 21  1455 21  0417   * 136 135   K 4.3 3.1* 2.8*    102 101   CO2 22* 24 27   BUN 6* 6* 4*   CREATININE 0.7 0.7 0.8   GLUCOSE 111* 98 94   MG  --  1.8 1.7     PT/INR:No results for input(s): PROTIME, INR in the last 72 hours. APTT:No results for input(s): APTT in the last 72 hours. LIVER PROFILE:  Recent Labs     03/24/21  1455   AST 39   ALT 28   BILIDIR <0.2   BILITOT 0.5   ALKPHOS 245*       Imaging Last 24 Hours:  No results found. Assessment//Plan           Hospital Problems           Last Modified POA    Neutropenic fever ([facility] [address] 3/17/2021 Yes    Sepsis without acute organ dysfunction ([address] 3/24/2021 Yes    Altered mental status 3/19/2021 Yes    S/P  shunt 3/24/2021 Yes    Acute encephalopathy 3/24/2021 Yes    Pancytopenia ([address] 3/24/2021 Yes    Elevated LFTs 3/24/2021 Yes    Bilateral lower extremity edema 3/24/2021 Yes    Hypokalemia 3/24/2021 Yes    Hypoalbuminemia 3/24/2021 Yes    Low BUN 3/24/2021 Yes    Hx of seizure disorder 3/24/2021 Yes    Essential hypertension 3/24/2021 Yes    Hyperlipidemia 3/24/2021 Yes        Assessment:  (Severe neutropenia,multifactorial.. Continue, Neupogen. ).    ).       Electronically signed by Thai James MD on 3/25/21 at 12:50 PM EDT

## 2021-03-25 NOTE — PLAN OF CARE
Problem: Falls - Risk of:  Goal: Will remain free from falls  Description: Will remain free from falls  Outcome: Met This Shift  Note: Patient remained free from falls this shift. Bed is in low position with alarm on and siderails up x2. Education given on use of call light and patient voiced understanding. Call light and beside table within reach. Arm band and falling star in place. Hourly rounds completed. Will continue to monitor. Goal: Absence of physical injury  Description: Absence of physical injury  Outcome: Met This Shift     Problem: Injury - Risk of, Physical Injury:  Goal: Will remain free from falls  Description: Will remain free from falls  Outcome: Met This Shift  Note: Patient remained free from falls this shift. Bed is in low position with alarm on and siderails up x2. Education given on use of call light and patient voiced understanding. Call light and beside table within reach. Arm band and falling star in place. Hourly rounds completed. Will continue to monitor. Goal: Absence of physical injury  Description: Absence of physical injury  Outcome: Met This Shift     Problem: Physical Regulation:  Goal: Ability to maintain a body temperature in the normal range will improve  Description: Ability to maintain a body temperature in the normal range will improve  Outcome: Met This Shift  Goal: Postoperative complications will be avoided or minimized  Description: Postoperative complications will be avoided or minimized  Outcome: Met This Shift     Problem: Skin Integrity:  Goal: Will show no infection signs and symptoms  Description: Will show no infection signs and symptoms  Outcome: Ongoing  Goal: Absence of new skin breakdown  Description: Absence of new skin breakdown  Outcome: Ongoing  Note: No signs of new skin breakdown noted with each assessment this shift. Skin warm, dry, and intact except where otherwise noted in head-to-toe assessment.  Mucous membranes pink and moist. Assistance with turns/ambulation given as needed. Problem: Confusion - Acute:  Goal: Mental status will be restored to baseline  Description: Mental status will be restored to baseline  Outcome: Ongoing  Note: Patient is A&O x 3-4. Patient is intermittently confused. Problem: Discharge Planning:  Goal: Discharged to appropriate level of care  Description: Discharged to appropriate level of care  Outcome: Ongoing  Note: Patient plans to be discharged to [facility]. Plan discussed with patient. Problem: Pain:  Goal: Pain level will decrease  Description: Pain level will decrease  Outcome: Ongoing     Problem: Nutrition  Goal: Optimal nutrition therapy  Outcome: Ongoing  Note: Patient is on a dysphagia pureed diet. Restrictions have been reviewed. Patient understands restrictions. Problem: Musculor/Skeletal Functional Status  Goal: Highest potential functional level  Outcome: Ongoing     Care plan reviewed with patient and family. Patient and family verbalize understanding of the plan of care and contribute to goal setting.

## 2021-03-25 NOTE — PROGRESS NOTES
Assess patient for PICC line. Left arm appropriate for left arm placement. Measured left arm picture taken. Supplies open arm sterile cleaned with chlor prep. Pt asked if window could be closed curtain pulled down per pt request. Pt adamant that window be closed informed window was sealed and was unable to close window. Pt started raising voice she can spell carbon monoxide. Pt started swung left arm. Pt stated we are not doing anything. When explained to patient we are placing picc line and is a sterile procedure pt yell stop several times. Asked pt if she wanted to stop procedure patient stated yes. Preceded to remove sterile equipment. Cleaned room. Sat pt up informed nurse procedure could not be completed due to behaviors and because pt asked us to stop.

## 2021-03-25 NOTE — CARE COORDINATION
3/25/21, 1:35 PM EDT    DISCHARGE PLANNING EVALUATION  Called and left a message for Corie in admissions at [facility]. Made her aware Nura Rubio is a potential discharge for tomorrow.

## 2021-03-25 NOTE — PROGRESS NOTES
[facility]  INPATIENT PHYSICAL THERAPY  DAILY NOTE  [facility] 4A - 4A-16/016-A     Time In: 4906  Time Out: 3383  Timed Code Treatment Minutes: 23 Minutes  Minutes: 23          Date: 3/25/2021  Patient Name: Shayne Olivares,  Gender:  female        MRN: [id number]  : 1960  (61 y.o.)     Referring Practitioner: KRIS Montesinos  Diagnosis: neutropenic fever  Additional Pertinent Hx: per H&P:60 y.o. 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 History is obtained from chart review and speaking with the ED provider as family left earlier in the evening. Patient does not respond to questions and mumbles to herself during exam.Per family, patient has had worsening neurological decline since December, now unable to walk. Last , she began having AMS without any obvious signs or symptoms of infection. She was seen by her neurologist, who is located near [place], who had some concern about her shunt malfunctioning. Patient does open her eyes to sound, but does not respond in any other way. CBC was significant for neutropenia and repeated. WBCs were 0.4 and 0.3 on repeat. Associated with fever. Evidence of UTI. No other evidence of infection on imaging or skin exam.  Given vanc/zosyn in ED. Imaging on arrival at [facility] showed moderate ventriculomegaly, slightly progressed compared to imaging from 3/11/2020. Some subcortical white matter hypo-attenuation demonstrated at the level of he left frontal lobe, which could represent an age-indeterminate infarct.   Some fluid noted in pelvis near shunt, could benefit from paracentesis and culture     Prior Level of Function:  Type of Home: Facility  Home Layout: One level        ADL Assistance: Needs assistance  Homemaking Responsibilities: No  Ambulation Assistance: Needs assistance  Transfer Assistance: Needs assistance  Additional Comments: per brother pt hasn't been ambulatory and requires assist for mobility at [facility]    Restrictions/Precautions:  Restrictions/Precautions: General Precautions, Fall Risk  Position Activity Restriction  Other position/activity restrictions: confusion, per brother pt has been NWB RLE from hx of ankle fx, pt was wearing a boot but hasn't recently had the boot discharged, brother assumes pt is still NWB RLE      SUBJECTIVE: Pt resting in bed and needed encouragement to awaken and participate with therapy. PAIN: no # given, but did answer yes when asked if she had pain. Vitals: Vitals not assessed per clinical judgement, see nursing flowsheet    OBJECTIVE:  Bed Mobility:  Supine to Sit: Moderate Assistance  Sit to Supine: Moderate Assistance     Transfers:  Not Tested. Pt refused to attempt and stated that she was getting dizzy while sitting here. Ambulation:  Not tested    Balance:  Static Sitting Balance:  Stand By Assistance  Dynamic Sitting Balance: Stand By Assistance, [employer], and able to complete seated LE exercises with verbal cues    Exercise:  Patient was guided in 1 set(s) 10 reps of exercise to both lower extremities. Ankle pumps, Seated marches and Long arc quads. Exercises were completed for increased independence with functional mobility. Functional Outcome Measures: Not completed       ASSESSMENT:  Assessment: Patient progressing toward established goals. and participated fairly with bed mobility and exercises, but refused to attempt standing due to dizziness. Activity Tolerance:  Patient tolerance of  treatment: good.        Equipment Recommendations:Equipment Needed: No  Discharge Recommendations:   Continue to assess pending progress, Patient would benefit from continued therapy after discharge, ECF with PT    Plan: Times per week: 3-5x GM  Times per day: Daily  Specific instructions for Next Treatment: therex, bed mobility, sitting balance, PT to assess transfers/gait    Patient Education  Patient Education: Plan of Care, Home Exercise Program    Goals:  Patient goals : not stated  Short term goals  Time Frame for Short term goals: by discharge  Short term goal 1: Roll R/L with modA for pressure reilef  Short term goal 2: sidelying to/from sit with modA to get in/out of bed  Short term goal 3: tolerate >15 min dyn sitting balance activity with SBA to demonstrate inc strength for progression with transfers  Short term goal 4: PT to assess transfers/gait  Long term goals  Time Frame for Long term goals : no LTGs set secondary to short ELOS    Following session, patient left in safe position with all fall risk precautions in place. Irma Hanley.  Herman Traylor [address]

## 2021-03-25 NOTE — CARE COORDINATION
3/25/21, 2:08 PM EDT    DISCHARGE ON GOING [facility] day: 8  Location: -16/016-A Reason for admit: Neutropenic fever ([address] [D70.9, R50.81]   Procedure:  3/22  VENTRICULAR PERITONEAL SHUNT REVISION  Barriers to Discharge: ID following. Plan is oral antibiotics on discharge, Hem/Onc following, PT/OT, IV Hydralazine prn, pain control, IV Zosyn, IV Vancomycin, electrolyte replacement protocol. PCP: Marilyn La MD  Readmission Risk Score: 16%  Patient Goals/Plan/Treatment Preferences: Plan is to return [address] [place] [address]. SW following.

## 2021-03-25 NOTE — PROGRESS NOTES
Pharmacy Vancomycin Consult     Vancomycin Day: 3  Current Dosing: Vancomycin 1250mg q12h    Temp max:  afebrile    Recent Labs     03/24/21  0437 03/24/21  1455 03/25/21  0417   BUN  --  6* 4*   CREATININE  --  0.7 0.8   WBC 2.0*  --  2.6*       Intake/Output Summary (Last 24 hours) at 3/25/2021 1423  Last data filed at 3/25/2021 1350  Gross per 24 hour   Intake 1337.61 ml   Output 4000 ml   Net -2662.39 ml       Cultures/Sensitivites  Date Source Result   3/17/2021 BC1 ngtd   3/17/2021 BC2 ngtd   3/19/2021 CSF fluid NG   3/19/2021 Meningitis panel negative         Ht Readings from Last 1 Encounters:   03/18/21 5' 6\" (1.676 m)        Wt Readings from Last 1 Encounters:   03/25/21 (!) 302 lb 4.8 oz (137.1 kg)       Body mass index is 48.79 kg/m². Estimated Creatinine Clearance: 107 mL/min (based on SCr of 0.8 mg/dL).     Trough: 18.1 mcg/mL    Assessment/Plan:  No change in current vancomycin dose or interval - will monitor closely and recheck in 2-3 days to make sure no accumulation  Patient is neutrapenic (unknown cause) and is on neupogen  UO adequate  Serum creatinine stable

## 2021-03-26 ENCOUNTER — APPOINTMENT (OUTPATIENT)
Dept: GENERAL RADIOLOGY | Age: 61
DRG: 022 | End: 2021-03-26
Payer: MEDICAID

## 2021-03-26 LAB
ANION GAP SERPL CALCULATED.3IONS-SCNC: 7 MEQ/L (ref 8–16)
ATYPICAL LYMPHOCYTES: ABNORMAL %
BASOPHILS # BLD: 0.5 %
BASOPHILS ABSOLUTE: 0 THOU/MM3 (ref 0–0.1)
BUN BLDV-MCNC: 4 MG/DL (ref 7–22)
CALCIUM SERPL-MCNC: 8.6 MG/DL (ref 8.5–10.5)
CHLORIDE BLD-SCNC: 103 MEQ/L (ref 98–111)
CO2: 26 MEQ/L (ref 23–33)
CREAT SERPL-MCNC: 0.6 MG/DL (ref 0.4–1.2)
DIFFERENTIAL TYPE: ABNORMAL
EKG ATRIAL RATE: 73 BPM
EKG Q-T INTERVAL: 384 MS
EKG QRS DURATION: 96 MS
EKG QTC CALCULATION (BAZETT): 417 MS
EKG R AXIS: -5 DEGREES
EKG T AXIS: 43 DEGREES
EKG VENTRICULAR RATE: 71 BPM
EOSINOPHIL # BLD: 0 %
EOSINOPHILS ABSOLUTE: 0 THOU/MM3 (ref 0–0.4)
ERYTHROCYTE [DISTWIDTH] IN BLOOD BY AUTOMATED COUNT: 15.1 % (ref 11.5–14.5)
ERYTHROCYTE [DISTWIDTH] IN BLOOD BY AUTOMATED COUNT: 56.2 FL (ref 35–45)
GFR SERPL CREATININE-BSD FRML MDRD: > 90 ML/MIN/1.73M2
GLUCOSE BLD-MCNC: 110 MG/DL (ref 70–108)
HCT VFR BLD CALC: 30 % (ref 37–47)
HEMOGLOBIN: 9.3 GM/DL (ref 12–16)
IMMATURE GRANS (ABS): 0.03 THOU/MM3 (ref 0–0.07)
IMMATURE GRANULOCYTES: 1.4 %
LYMPHOCYTES # BLD: 27.5 %
LYMPHOCYTES ABSOLUTE: 0.6 THOU/MM3 (ref 1–4.8)
MAGNESIUM: 2 MG/DL (ref 1.6–2.4)
MCH RBC QN AUTO: 31.4 PG (ref 26–33)
MCHC RBC AUTO-ENTMCNC: 31 GM/DL (ref 32.2–35.5)
MCV RBC AUTO: 101.4 FL (ref 81–99)
MONOCYTES # BLD: 43.2 %
MONOCYTES ABSOLUTE: 1 THOU/MM3 (ref 0.4–1.3)
NUCLEATED RED BLOOD CELLS: 1 /100 WBC
PATHOLOGIST REVIEW: ABNORMAL
PLATELET # BLD: 44 THOU/MM3 (ref 130–400)
PLATELET ESTIMATE: ABNORMAL
PMV BLD AUTO: 13 FL (ref 9.4–12.4)
POTASSIUM SERPL-SCNC: 3.5 MEQ/L (ref 3.5–5.2)
RBC # BLD: 2.96 MILL/MM3 (ref 4.2–5.4)
SCAN OF BLOOD SMEAR: NORMAL
SEG NEUTROPHILS: 27.4 %
SEGMENTED NEUTROPHILS ABSOLUTE COUNT: 0.6 THOU/MM3 (ref 1.8–7.7)
SODIUM BLD-SCNC: 136 MEQ/L (ref 135–145)
WBC # BLD: 2.2 THOU/MM3 (ref 4.8–10.8)

## 2021-03-26 PROCEDURE — 6370000000 HC RX 637 (ALT 250 FOR IP): Performed by: PHYSICIAN ASSISTANT

## 2021-03-26 PROCEDURE — 85025 COMPLETE CBC W/AUTO DIFF WBC: CPT

## 2021-03-26 PROCEDURE — 6360000002 HC RX W HCPCS: Performed by: INTERNAL MEDICINE

## 2021-03-26 PROCEDURE — 6370000000 HC RX 637 (ALT 250 FOR IP): Performed by: FAMILY MEDICINE

## 2021-03-26 PROCEDURE — 6360000002 HC RX W HCPCS: Performed by: PHYSICIAN ASSISTANT

## 2021-03-26 PROCEDURE — 2580000003 HC RX 258: Performed by: FAMILY MEDICINE

## 2021-03-26 PROCEDURE — 74018 RADEX ABDOMEN 1 VIEW: CPT

## 2021-03-26 PROCEDURE — 2580000003 HC RX 258: Performed by: INTERNAL MEDICINE

## 2021-03-26 PROCEDURE — 36415 COLL VENOUS BLD VENIPUNCTURE: CPT

## 2021-03-26 PROCEDURE — 90792 PSYCH DIAG EVAL W/MED SRVCS: CPT | Performed by: PSYCHIATRY & NEUROLOGY

## 2021-03-26 PROCEDURE — 83735 ASSAY OF MAGNESIUM: CPT

## 2021-03-26 PROCEDURE — 99232 SBSQ HOSP IP/OBS MODERATE 35: CPT | Performed by: FAMILY MEDICINE

## 2021-03-26 PROCEDURE — 2580000003 HC RX 258: Performed by: PHYSICIAN ASSISTANT

## 2021-03-26 PROCEDURE — 94760 N-INVAS EAR/PLS OXIMETRY 1: CPT

## 2021-03-26 PROCEDURE — 2060000000 HC ICU INTERMEDIATE R&B

## 2021-03-26 PROCEDURE — 80048 BASIC METABOLIC PNL TOTAL CA: CPT

## 2021-03-26 RX ADMIN — ACETAMINOPHEN 650 MG: 325 TABLET ORAL at 09:50

## 2021-03-26 RX ADMIN — RISPERIDONE 0.5 MG: 0.25 TABLET ORAL at 20:44

## 2021-03-26 RX ADMIN — PIPERACILLIN AND TAZOBACTAM 3375 MG: 3; .375 INJECTION, POWDER, LYOPHILIZED, FOR SOLUTION INTRAVENOUS at 04:24

## 2021-03-26 RX ADMIN — Medication 1250 MG: at 13:48

## 2021-03-26 RX ADMIN — SODIUM CHLORIDE, PRESERVATIVE FREE 10 ML: 5 INJECTION INTRAVENOUS at 20:45

## 2021-03-26 RX ADMIN — ACETAMINOPHEN 650 MG: 325 TABLET ORAL at 15:31

## 2021-03-26 RX ADMIN — RISPERIDONE 0.5 MG: 0.25 TABLET ORAL at 09:50

## 2021-03-26 RX ADMIN — PIPERACILLIN AND TAZOBACTAM 3375 MG: 3; .375 INJECTION, POWDER, LYOPHILIZED, FOR SOLUTION INTRAVENOUS at 11:43

## 2021-03-26 RX ADMIN — Medication 1250 MG: at 02:49

## 2021-03-26 RX ADMIN — ONDANSETRON 4 MG: 2 INJECTION INTRAMUSCULAR; INTRAVENOUS at 09:42

## 2021-03-26 RX ADMIN — POTASSIUM BICARBONATE 40 MEQ: 782 TABLET, EFFERVESCENT ORAL at 09:50

## 2021-03-26 RX ADMIN — LEVETIRACETAM 1000 MG: 500 TABLET, FILM COATED ORAL at 20:45

## 2021-03-26 RX ADMIN — MAGNESIUM GLUCONATE 500 MG ORAL TABLET 400 MG: 500 TABLET ORAL at 09:50

## 2021-03-26 RX ADMIN — SENNOSIDES 8.6 MG: 8.6 TABLET, FILM COATED ORAL at 20:44

## 2021-03-26 RX ADMIN — PIPERACILLIN AND TAZOBACTAM 3375 MG: 3; .375 INJECTION, POWDER, LYOPHILIZED, FOR SOLUTION INTRAVENOUS at 20:45

## 2021-03-26 RX ADMIN — ACETAMINOPHEN 650 MG: 325 TABLET ORAL at 23:30

## 2021-03-26 RX ADMIN — NYSTATIN 500000 UNITS: 100000 SUSPENSION ORAL at 20:44

## 2021-03-26 RX ADMIN — LOSARTAN POTASSIUM 100 MG: 100 TABLET, FILM COATED ORAL at 09:50

## 2021-03-26 RX ADMIN — MAGNESIUM GLUCONATE 500 MG ORAL TABLET 400 MG: 500 TABLET ORAL at 20:45

## 2021-03-26 RX ADMIN — AMLODIPINE BESYLATE 5 MG: 5 TABLET ORAL at 09:51

## 2021-03-26 RX ADMIN — LEVETIRACETAM 1000 MG: 500 TABLET, FILM COATED ORAL at 09:50

## 2021-03-26 RX ADMIN — ATORVASTATIN CALCIUM 20 MG: 20 TABLET, FILM COATED ORAL at 20:45

## 2021-03-26 RX ADMIN — SODIUM CHLORIDE, PRESERVATIVE FREE 10 ML: 5 INJECTION INTRAVENOUS at 09:52

## 2021-03-26 RX ADMIN — SENNOSIDES 8.6 MG: 8.6 TABLET, FILM COATED ORAL at 09:50

## 2021-03-26 ASSESSMENT — PAIN SCALES - GENERAL: PAINLEVEL_OUTOF10: 3

## 2021-03-26 ASSESSMENT — PAIN DESCRIPTION - PAIN TYPE: TYPE: ACUTE PAIN

## 2021-03-26 ASSESSMENT — PAIN SCALES - WONG BAKER: WONGBAKER_NUMERICALRESPONSE: 0

## 2021-03-26 ASSESSMENT — PAIN DESCRIPTION - FREQUENCY: FREQUENCY: CONTINUOUS

## 2021-03-26 ASSESSMENT — PAIN DESCRIPTION - ONSET: ONSET: GRADUAL

## 2021-03-26 NOTE — PROGRESS NOTES
piperacillin-tazobactam  3,375 mg Intravenous Q8H    nystatin  5 mL Mouth/Throat 4x Daily       sodium chloride flush, hydrALAZINE, sodium chloride flush, promethazine **OR** ondansetron, HYDROcodone 5 mg - acetaminophen, HYDROmorphone, acetaminophen, potassium chloride **OR** potassium alternative oral replacement **OR** potassium chloride, polyethylene glycol, simethicone      LABS:     CBC:   Recent Labs     03/24/21  0437 03/25/21  0417 03/26/21  0344   WBC 2.0* 2.6* 2.2*   HGB 10.8* 10.6* 9.3*   PLT 56* 50* 44*     BMP:    Recent Labs     03/24/21  1455 03/25/21  0417 03/25/21  2256 03/26/21  0344    135  --  136   K 3.1* 2.8* 3.2* 3.5    101  --  103   CO2 24 27  --  26   BUN 6* 4*  --  4*   CREATININE 0.7 0.8  --  0.6   GLUCOSE 98 94  --  110*     Calcium:  Recent Labs     03/26/21  0344   CALCIUM 8.6     Ionized Calcium:No results for input(s): IONCA in the last 72 hours. Magnesium:  Recent Labs     03/26/21  0344   MG 2.0    INR:   No results for input(s): INR in the last 72 hours. Hepatic:   Recent Labs     03/24/21  1455   ALKPHOS 245*   ALT 28   AST 39   PROT 6.9   BILITOT 0.5   BILIDIR <0.2   LABALBU 2.3*         Problem list of patient:     Patient Active Problem List   Diagnosis Code    Neutropenic fever ([facility] D70.9, R50.81    Sepsis without acute organ dysfunction ([facility] A41.9    Altered mental status R41.82    S/P  shunt Z98.2    Acute encephalopathy G93.40    Pancytopenia (HCC) D61.818    Elevated LFTs R79.89    Bilateral lower extremity edema R60.0    Hypokalemia E87.6    Hypoalbuminemia E88.09    Low BUN R79.89    Hx of seizure disorder Z86.69    Essential hypertension I10    Hyperlipidemia E78.5         ASSESSMENT/PLAN   Change in mental state: felt to be due to  shunt malfunctionL she had a new shunt. Slowly getting better.   Neutropenia : better  Fever : resolved   continue current treatment  Layne Gilbert MD, FACP 3/26/2021 12:54 PM

## 2021-03-26 NOTE — FLOWSHEET NOTE
[place] [address] PROGRESS NOTE      Patient: Rachelle Bowers  Room #: 3L-49/107-U            YOB: 1960  Age: 61 y.o. Gender: female            Admit Date & Time: 3/17/2021  6:41 PM    Assessment:  Consult from nursing: patient crying    Per chart, patient experiencing AMS. Her head has been shaved and there is evidence of a possible recent procedure to her head. She was tearful throughout the encounter. Lars Bell is a 61year old mother of (possibly) 4 children- mentioned two sons and two daughters. One child is as young as 21. She starts off talking about a friend named Rogelio Dutta who has taken in three special needs children. States she \"misses him and cries whenever she thinks about him. \"     Her conversation is flighty and disconnected. She expresses distress over her condition and not being able to walk. She feels it is inappropriate that her grandsons \"see her thuy wheelchair\" because \"they shouldn;t have to see that. \"     States she has been \"saved at least twenty times\" over her life. Feels her distress is evidence of a lack of natasha, which causes her even more distress. Thinks a daughter has been to see her, but is unsure as she admits to having no concept of time. States \" I want to go to a different nursing home when I leave here. \"    When  mentioned Johanne's upcoming birthday, she said,\" I don';t even care about it. \"      Interventions:  Patient provided ministry of presence as patient shared her story and the things that are distressing her. Reminded her of Markos in [place] and his fear despite perfect natasha. Discussed fear/distress over her situation as not being an indicator of lack of natasha. Suggested she not pull away from her grandsons and the love and support from family as she would be helping them learn compassion toward others.   Offered song/prayer inviting her to use her feelings as a way of entering more deeply into her natasha experience. Outcomes:  Keisha Zuleta was calmer after prayer and was able to share some verses with meaning for her when  mentioned a couple that patient knew. She eventually fell asleep . Plan:    1. Chaplains remain available. 2. Will note on green sheet for special attention from weekend chaplains. Electronically signed by Vashti Hassan on 3/26/2021 at 11:29 AM.  [facility]  [phone]             03/26/21 1045   Encounter Summary   Services provided to: Patient   Referral/Consult From: Nurse   Support System Children;Family members   Continue Visiting Yes  (3/26 emotional)   Complexity of Encounter High   Length of Encounter 45 minutes   Spiritual Assessment Completed Yes   Crisis   Type Emotional distress   Assessment Approachable;Tearful;Fearful; Hopeless;Spiritual struggle; Shame;Despair   Intervention Active listening;Explored feelings, thoughts, concerns;Explored coping resources;Nurtured hope;Prayer;Scripture;Sustaining presence/ Ministry of presence;Empowerment; Discussed illness/injury and it's impact; Discussed belief system/Moravian practices/natasha;Discussed relationship with God   Outcome Connection/belonging;Comfort;Expressed gratitude;Engaged in conversation; Tearful;Less anxious, less agitated;Receptive

## 2021-03-26 NOTE — CONSULTS
Department of Psychiatry  Consult Service   Psychiatric Assessment      Thank you very much for allowing us to participate in the care of this patient. Reason for Consult: depression    HISTORY OF PRESENT ILLNESS:          The patient is a 61 y.o. female with unknown psychiatric history who is admitted medically for neutropenic fever, septicemia, and AMS. Patient is a poor historian due to AMS and is not oriented to situation, place, or date. She is very tearful throughout encounter and is unable to tell me why she is upset. Patient tells me her name and states, \"I'm sorry Sandie Bhatti. I don't deserve that name. \" Patient says she's been thinking about death a lot lately, but denies suicidal ideation. Affirms visual/auditory hallucinations. States she saw and heard a group of children outside her room putting together packets for [employer]. She states they ran out of packets and it made her very sad and tearful. She affirms seeing animals in her room from time to time. Patient thinks she suffers from depression and really enjoyed talking to her therapist in the past. She's not sure what day it is or what medications she takes, she states \" I get mixed up. \"     PSYCHIATRIC HISTORY:      · Outpatient psychiatric provider:  [facility]  · Suicide attempts: [facility]  · Inpatient psychiatric admissions: [facility]    Past psychiatric medications includes:   Geodon per history  Adverse reactions from psychotropic medications: Allergy to Geodon     Lifetime Psychiatric Review of Systems     ·    Obsessions and Compulsions: Denies    ·    Nela or Hypomania: Denies  ·    Hallucinations: Affirms visual and auditory hallucinations of children and animals  ·    Panic Attacks:  Denies  ·    Delusions:  Denies  ·    Phobias:  Denies  ·    Trauma: Denies    Prior to Admission medications    Medication Sig Start Date End Date Taking?  Authorizing Provider   risperiDONE (RISPERDAL) 0.5 MG tablet Take 0.5 mg by mouth 2 times daily Historical Provider, MD   senna (SENOKOT) 8.6 MG tablet Take 1 tablet by mouth 2 times daily    Historical Provider, MD   amLODIPine (NORVASC) 5 MG tablet Take 5 mg by mouth daily 4/12/20   Historical Provider, MD   levETIRAcetam (KEPPRA) 500 MG tablet Take 1,000 mg by mouth 2 times daily  4/16/20   Historical Provider, MD   atorvastatin (LIPITOR) 20 MG tablet Take 20 mg by mouth nightly    Historical Provider, MD   losartan (COZAAR) 100 MG tablet Take 100 mg by mouth daily 5/1/20   Historical Provider, MD   Simethicone 80 MG TABS Take 80 mg by mouth every 8 hours as needed    Historical Provider, MD        Medications:    Current Facility-Administered Medications: lidocaine PF 1 % injection 5 mL, 5 mL, Intradermal, Once  sodium chloride flush 0.9 % injection 10 mL, 10 mL, Intravenous, 2 times per day  sodium chloride flush 0.9 % injection 10 mL, 10 mL, Intravenous, PRN  magnesium oxide (MAG-OX) tablet 400 mg, 400 mg, Oral, BID  hydrALAZINE (APRESOLINE) injection 10 mg, 10 mg, Intravenous, Q6H PRN  lidocaine PF 1 % injection 5 mL, 5 mL, Intradermal, Once  sodium chloride flush 0.9 % injection 10 mL, 10 mL, Intravenous, 2 times per day  sodium chloride flush 0.9 % injection 10 mL, 10 mL, Intravenous, PRN  vancomycin (VANCOCIN) 1250 mg in dextrose 5 % 250 mL IVPB, 1,250 mg, Intravenous, Q12H  vancomycin (VANCOCIN) intermittent dosing (placeholder), , Other, RX Placeholder  promethazine (PHENERGAN) tablet 12.5 mg, 12.5 mg, Oral, Q6H PRN **OR** ondansetron (ZOFRAN) injection 4 mg, 4 mg, Intravenous, Q6H PRN  HYDROcodone-acetaminophen (NORCO) 5-325 MG per tablet 1 tablet, 1 tablet, Oral, Q6H PRN  HYDROmorphone (DILAUDID) injection 0.5 mg, 0.5 mg, Intravenous, Q3H PRN  acetaminophen (TYLENOL) tablet 650 mg, 650 mg, Oral, Q4H PRN  potassium chloride (KLOR-CON M) extended release tablet 40 mEq, 40 mEq, Oral, PRN **OR** potassium bicarb-citric acid (EFFER-K) effervescent tablet 40 mEq, 40 mEq, Oral, PRN **OR** potassium chloride 10 mEq/100 mL IVPB (Peripheral Line), 10 mEq, Intravenous, PRN  [Held by provider] enoxaparin (LOVENOX) injection 40 mg, 40 mg, Subcutaneous, Q12H  polyethylene glycol (GLYCOLAX) packet 17 g, 17 g, Oral, Daily PRN  amLODIPine (NORVASC) tablet 5 mg, 5 mg, Oral, Daily  atorvastatin (LIPITOR) tablet 20 mg, 20 mg, Oral, Nightly  losartan (COZAAR) tablet 100 mg, 100 mg, Oral, Daily  risperiDONE (RISPERDAL) tablet 0.5 mg, 0.5 mg, Oral, BID  senna (SENOKOT) tablet 8.6 mg, 1 tablet, Oral, BID  simethicone (MYLICON) chewable tablet 80 mg, 80 mg, Oral, Q8H PRN  levETIRAcetam (KEPPRA) tablet 1,000 mg, 1,000 mg, Oral, BID  calcium replacement protocol, , Other, RX Placeholder  piperacillin-tazobactam (ZOSYN) 3,375 mg in dextrose 5 % 50 mL IVPB (mini-bag), 3,375 mg, Intravenous, Q8H  nystatin (MYCOSTATIN) [id number] UNIT/ML suspension 500,000 Units, 5 mL, Mouth/Throat, 4x Daily     Past Medical History:        Diagnosis Date    Brain aneurysm     Hypertension     Seizures (HCC)        Past Surgical History:        Procedure Laterality Date     SECTION      x2    GALLBLADDER SURGERY      partial     VENTRICULOPERITONEAL SHUNT N/A 3/22/2021    VENTRICULAR PERITONEAL SHUNT REVISION performed by Torie Montero MD at [address] [facility]: Geodon [ziprasidone]      Social History:    RESIDENCE: patient lives at [facility] per nurse, patient states \"I live where I'm at\"  MARITAL STATUS: states she is     CHILDREN: reports 3 children  OCCUPATION: on social security  EDUCATION: [employer]    SUBSTANCE USE HISTORY: [address] and Psychiatric History:         Problem Relation Age of Onset    Cancer Mother         Breast, Lung    Diabetes Father     Cancer Father         colon    Alzheimer's Disease Father     ADHD Father        Physical  /74   Pulse 71   Temp 98.2 °F (36.8 °C) (Oral)   Resp 14   Ht 5' 6\" (1.676 m)   Wt 298 lb 4.8 oz (135.3 kg)   SpO2 96%   BMI 48.15 kg/m²     Mental Status Examination:  Level of consciousness:  Within normal limits  Appearance: hospital attire, lying in bed  Behavior/Motor: has constant head bobbing   Attitude toward examiner:  Cooperative with good eye contact  Speech:  Spontaneous, normal rate and volume  Mood: sad  Affect: mood congruent, patient very tearful  Thought processes:  Tangential   Thought content: Denies suicidal ideations   Denies homicidal ideations    Affirms hallucinations   Denies delusions  Cognition:  Oriented to self only, is not oriented to situation, location, date  Concentration clinically adequate  Memory is decreased   Insight & Judgment:  Decreased, limited     DSM-5 DIAGNOSIS:    Mood disorder, unspecified   Rule out delirium     Stressors     Source of stressors include: [facility]    PLAN:    Refer to Dr. Pamela Burrows attestation for plan   Additional recommendations will follow the clinical course. Thank you very much for allowing us to participate in the care of this patient. Time spent 35 min.       Electronically signed by Mikal Collazo on 3/26/21 at 11:13 AM EDT

## 2021-03-26 NOTE — CARE COORDINATION
3/26/21, 12:16 PM EDT    DISCHARGE PLANNING EVALUATION  Patient will not be discharged today. Spoke with Zahra Cruz at the facility and made her aware she could be a potential discharge for the weekend. They are ready to accept whenever Shira Murillo is medically ready. 3/26/21, 12:18 PM EDT    Patient goals/plan/ treatment preferences discussed by  and . Patient goals/plan/ treatment preferences reviewed with patient/ family. Patient/ family verbalize understanding of discharge plan and are in agreement with goal/plan/treatment preferences. Understanding was demonstrated using the teach back method. AVS provided by RN at time of discharge, which includes all necessary medical information pertaining to the patients current course of illness, treatment, post-discharge goals of care, and treatment preferences. Shira Murillo is a potential discharge for the weekend. She will be skilled under her [facility]. The precert has been started, but the facility does not need an answer before Shira Murillo returns. Spoke with Zahra Cruz at the facility and they will be ready for her over the weekend. Discharge instructions and transport forms are attached to patients blue discharge packet.

## 2021-03-26 NOTE — CARE COORDINATION
3/26/21, 2:27 PM EDT    DISCHARGE ON GOING [address] [facility] day: 9  Location: -16/016-A Reason for admit: Neutropenic fever ([address] [D70.9, R50.81]   Procedure:  3/22  VENTRICULAR PERITONEAL SHUNT REVISION  Barriers to Discharge:  ID following. Plan is oral antibiotics on discharge, Hem/Onc following, PT/OT, IV Hydralazine prn, pain control, IV Zosyn, IV Vancomycin, electrolyte replacement protocol. WBC 2.2  PCP: Delia Dodson MD  Readmission Risk Score: 17%  Patient Goals/Plan/Treatment Preferences: Plan is to return to [facility]. SW following.

## 2021-03-26 NOTE — PROGRESS NOTES
Allegra Spears 60  OCCUPATIONAL THERAPY MISSED TREATMENT NOTE  [facility] NEUROSCIENCES 4A  4A-16/016-A      Date: 3/26/2021  Patient Name: Wander Yo        CSN: [id number]   : 1960  (61 y.o.)  Gender: female   Referring Practitioner: KRIS Reddy  Diagnosis: Neutropenic Fever         REASON FOR MISSED TREATMENT: RN stating pt is currently emotional and awaiting psych consult- not appropriate for therapuetic interventions at this time. Will re-attempt as able.

## 2021-03-26 NOTE — PROGRESS NOTES
Hospitalist Progress Note      Patient:  Tyrell Cruz [facility]    Unit/Bed:4A-16/016-A  YOB: 1960  MRN: [id number]   Acct: [de-identified]   PCP: Farideh Eaton MD  Date of Admission: 3/17/2021      Chief Complaint: AMS    Initial H and P:-    \"61 y.o. 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 History is obtained from chart review and speaking with the ED provider as family left earlier in the evening. Patient does not respond to questions and mumbles to herself during exam.     Per family, patient has had worsening neurological decline since December, now unable to walk. Last Sunday, she began having AMS without any obvious signs or symptoms of infection. She was seen by her neurologist, who is located near their [place] in [place], who had some concern about her shunt malfunctioning. Patient does open her eyes to sound, but does not respond in any other way.     CBC was significant for neutropenia and repeated. WBCs were 0.4 and 0.3 on repeat. Associated with fever. Evidence of UTI. No other evidence of infection on imaging or skin exam.  Given vanc/zosyn in ED.     Imaging on arrival at [facility] showed moderate ventriculomegaly, slightly progressed compared to imaging from 3/11/2020. Some subcortical white matter hypo-attenuation demonstrated at the level of he left frontal lobe, which could represent an age-indeterminate infarct. Some fluid noted in pelvis near shunt, could benefit from paracentesis and culture. \"    3/19: No acute events overnight. Per SW who spoke with NH, nursing home was speaking to family about hospice. Pt is following commands. Pt does not give good conversation. Pt not complaining of pain. 3/20: No acute events overnight.  Pt is alert but lethargic. Shuntogram showed possible occulusion. 3/21: No acute events overnight. Pt is more alert and talkative today. Pt understands that she will undergo surgery tomorrow. No issues or complaints. 3/22: No acute events overnight. Pt underwent surgery this AM for  shunt replacement, pt tolerated surgery well. No issues or complaints. Pt more awake. Subjective (past 24 hours):     Patient seen and examined. Pt is c/o mid abdominal pain, 10/10. She also reports nausea, no vomiting. She denies HA, blurry vision, focal weakness/numbness, chest pain, sob. She can't remember when was her last BM.  Reviewed chart, had BM today     Medications:  Reviewed    Infusion Medications     Scheduled Medications    lidocaine 1 % injection  5 mL Intradermal Once    sodium chloride flush  10 mL Intravenous 2 times per day    magnesium oxide  400 mg Oral BID    lidocaine 1 % injection  5 mL Intradermal Once    sodium chloride flush  10 mL Intravenous 2 times per day    vancomycin  1,250 mg Intravenous Q12H    vancomycin (VANCOCIN) intermittent dosing (placeholder)   Other RX Placeholder    [Held by provider] enoxaparin  40 mg Subcutaneous Q12H    amLODIPine  5 mg Oral Daily    atorvastatin  20 mg Oral Nightly    losartan  100 mg Oral Daily    risperiDONE  0.5 mg Oral BID    senna  1 tablet Oral BID    levETIRAcetam  1,000 mg Oral BID    calcium replacement protocol   Other RX Placeholder    piperacillin-tazobactam  3,375 mg Intravenous Q8H    nystatin  5 mL Mouth/Throat 4x Daily     PRN Meds: sodium chloride flush, hydrALAZINE, sodium chloride flush, promethazine **OR** ondansetron, HYDROcodone 5 mg - acetaminophen, HYDROmorphone, acetaminophen, potassium chloride **OR** potassium alternative oral replacement **OR** potassium chloride, polyethylene glycol, simethicone      Intake/Output Summary (Last 24 hours) at 3/26/2021 1237  Last data filed at 3/26/2021 0420  Gross per 24 hour   Intake 2549.13 ml   Output 4350 ml   Net -1800.87 ml       Diet:  DIET DYSPHAGIA PUREED; Low Microbial, Lactose Controlled  Dietary Nutrition Supplements: Standard High Calorie Oral Supplement    Exam:  /68   Pulse 80   Temp 98.4 °F (36.9 °C) (Oral)   Resp 20   Ht 5' 6\" (1.676 m)   Wt 298 lb 4.8 oz (135.3 kg)   SpO2 95%   BMI 48.15 kg/m²     General appearance: Chronically ill appearing white female  HEENT: Pupils equal, round, and reactive to light. Conjunctivae clear. (+) staples on right temple, looks clean, no incision nor drainage. Neck: Supple, with full range of motion. No jugular venous distention. Trachea midline. Respiratory:  Normal respiratory effort on RA. Diminished air entry on lung bases, no rales, no rhonchi, no wheezing   Cardiovascular: normal rate, regular rhythm with normal S1/S2 without murmurs, rubs or gallops. Abdomen: non-distended with normal bowel sounds, (+) mid abdomen tenderness   Musculoskeletal: passive and active ROM x 4 extremities. Skin: No rashes or lesions. Neurological exam reveals alert, oriented x 2 ( knows name and bday, she does not know where she is), normal speech, no focal findings or movement disorder noted, cranial nerves II through XII intact, motor and sensory grossly normal bilaterally. Exam of extremities: peripheral pulses normal, (+) trace pitting bipedal edema, (+) grade 1 pitting edema on both legs ( from below knees to distal legs), no clubbing or cyanosis.  SCD in placed on both legs          Labs:   Recent Labs     03/24/21  0437 03/25/21  0417 03/26/21  0344   WBC 2.0* 2.6* 2.2*   HGB 10.8* 10.6* 9.3*   HCT 34.5* 33.3* 30.0*   PLT 56* 50* 44*     Recent Labs     03/24/21  1455 03/25/21  0417 03/25/21  2256 03/26/21  0344    135  --  136   K 3.1* 2.8* 3.2* 3.5    101  --  103   CO2 24 27  --  26   BUN 6* 4*  --  4*   CREATININE 0.7 0.8  --  0.6   CALCIUM 9.4 9.0  --  8.6     Recent Labs     03/24/21  1455   AST 39   ALT 28   BILIDIR <0. 2   BILITOT 0.5   ALKPHOS 245*     Microbiology:    Blood culture #1:   Lab Results   Component Value Date    BC No growth-preliminary No growth  03/17/2021     Urinalysis:      Lab Results   Component Value Date    NITRU NEGATIVE 03/17/2021    WBCUA 10-15 03/17/2021    BACTERIA MANY 03/17/2021    RBCUA 3-5 03/17/2021    BLOODU NEGATIVE 03/17/2021    GLUCOSEU NEGATIVE 03/17/2021       Radiology:  CT HEAD WO CONTRAST   Final Result    Interval right frontal approach shunt revision with catheter tip crossing the midline and tip in the left thalamus with postsurgical pneumocephalus in the right lateral ventricle with mild interval decrease in size of the ventricles compared to prior    CT. **This report has been created using voice recognition software. It may contain minor errors which are inherent in voice recognition technology. **      Final report electronically signed by Dr. Sully Deluca MD on 3/23/2021 10:45 AM      RADIOLOGY REPORT   Final Result      NM CSF SHUNT EVALUATION   Final Result      Absence of activity in distal shunt catheter tubing and in the pelvis, highly suspicious for occlusion. Final report electronically signed by Dr. Sheldon Garber on 3/19/2021 4:01 PM      CT CHEST WO CONTRAST   Final Result   1. No acute intrathoracic findings. 2.  Minimal consolidation along the right lung base posteriorly, likely    representing atelectasis. This document has been electronically signed by: Malcolm Schuster MD on    03/17/2021 11:10 PM      All CTs at this facility use dose modulation techniques and iterative    reconstructions, and/or weight-based dosing   when appropriate to reduce radiation to a low as reasonably achievable. CT ABDOMEN PELVIS WO CONTRAST Additional Contrast? None   Final Result   1. No acute intra-abdominal or pelvic findings. No bowel obstruction. No renal obstruction. 2.  Small amount of fluid present within the lower abdomen/pelvis. This    is likely secondary to the ventriculoperitoneal shunt which is in close    proximity to this region. This document has been electronically signed by: Danii Bradley MD on    03/17/2021 11:03 PM      All CTs at this facility use dose modulation techniques and iterative    reconstructions, and/or weight-based dosing   when appropriate to reduce radiation to a low as reasonably achievable. XR SHUNT SERIES PLACEMENT (<4 VIEWS)   Final Result   1. There is a right-sided  shunt catheter. The visualized portions of the catheter. Intact. However, there is partial exclusion of the inferior aspect of the catheter overlying the lower pelvis. 2. There are mildly dilated gas-filled small bowel loops overlying the mid abdomen. Differential considerations would include a possible focal ileus or partial small bowel obstruction. Consider further characterization with CT. **This report has been created using voice recognition software. It may contain minor errors which are inherent in voice recognition technology. **      Final report electronically signed by Dr. Suhas Truong on 3/17/2021 8:39 PM      CT HEAD WO CONTRAST   Final Result   1. No acute intracranial hemorrhage or mass effect. 2. There is subcortical white matter hypoattenuation demonstrated at the level of the left frontal lobe as seen on axial images 20/7/2029. This may represent an age-indeterminate infarct. Although a remote infarct is favored given the appearance of    encephalomalacia on coronal series image 22.      3. There is moderate ventriculomegaly demonstrated. The overall degree of ventriculomegaly appears slightly progressed at the level of the anterior left lateral ventricle when compared to the prior examination dated 3/11/2020.      4. There is an anterior rightward approach ventricular peritoneal shunt catheter with the distal tip abutting the septum pellucidum on axial image 20.  This is similar in position when compared

## 2021-03-26 NOTE — PLAN OF CARE
Problem: Skin Integrity:  Goal: Absence of new skin breakdown  Description: Absence of new skin breakdown  Outcome: Ongoing  Note: No new signs of skin breakdown. Skin warm, dry, and intact. Mucous membranes pink and moist.  Assistance with turns/ambulation provided every 2 hours and PRN. Will continue to monitor. Problem: Falls - Risk of:  Goal: Will remain free from falls  Description: Will remain free from falls  Outcome: Ongoing  Note: Call light in reach, bed in lowest position, and bed alarm activated. Education given on use of call light before ambulation and when in need of assistance. Patient expressed understanding. Hourly visual checks performed and charted. Toileting offered to patient. No falls this shift, at any time. Arm band and falling star in place. Will continue to monitor. Problem: Falls - Risk of:  Goal: Absence of physical injury  Description: Absence of physical injury  Outcome: Ongoing  Note: Call light in reach, bed in lowest position, and bed alarm activated. Education given on use of call light before ambulation and when in need of assistance. Patient expressed understanding. Hourly visual checks performed and charted. Toileting offered to patient. No falls this shift, at any time. Arm band and falling star in place. Will continue to monitor. Problem: Confusion - Acute:  Goal: Mental status will be restored to baseline  Description: Mental status will be restored to baseline  Outcome: Ongoing  Note: Patient is oriented to person and place. Patient confused at baseline. Problem: Discharge Planning:  Goal: Discharged to appropriate level of care  Description: Discharged to appropriate level of care  Outcome: Ongoing  Note: Discharge planning in process and discussed with patient/family. Social work consulted for any additional needs. Care manager aware of discharge needs. Patient is from [facility] and plans to return there upon discharge. Problem: Injury - Risk of, Physical Injury:  Goal: Will remain free from falls  Description: Will remain free from falls  Outcome: Ongoing  Note: Call light in reach, bed in lowest position, and bed alarm activated. Education given on use of call light before ambulation and when in need of assistance. Patient expressed understanding. Hourly visual checks performed and charted. Toileting offered to patient. No falls this shift, at any time. Arm band and falling star in place. Will continue to monitor. Problem: Injury - Risk of, Physical Injury:  Goal: Absence of physical injury  Description: Absence of physical injury  Outcome: Ongoing  Note: Call light in reach, bed in lowest position, and bed alarm activated. Education given on use of call light before ambulation and when in need of assistance. Patient expressed understanding. Hourly visual checks performed and charted. Toileting offered to patient. No falls this shift, at any time. Arm band and falling star in place. Will continue to monitor. Problem: Pain:  Goal: Pain level will decrease  Description: Pain level will decrease  Outcome: Ongoing  Note: Patient able to use 0-10 pain scale. Denies pain at this time. Patient has a pain goal of \"no pain. \" Agreeable to take PRN pain medications. Problem: Physical Regulation:  Goal: Ability to maintain a body temperature in the normal range will improve  Description: Ability to maintain a body temperature in the normal range will improve  Outcome: Ongoing  Note: Patient remains afebrile. Problem: Physical Regulation:  Goal: Postoperative complications will be avoided or minimized  Description: Postoperative complications will be avoided or minimized  Outcome: Ongoing  Note: Dressing remains clean, dry, and intact. CHG bath daily. Care plan reviewed with patient and family. Patient and family verbalize understanding of the plan of care and contribute to goal setting.

## 2021-03-27 LAB
ALBUMIN SERPL-MCNC: 2 G/DL (ref 3.5–5.1)
ALP BLD-CCNC: 196 U/L (ref 38–126)
ALT SERPL-CCNC: 17 U/L (ref 11–66)
ANION GAP SERPL CALCULATED.3IONS-SCNC: 7 MEQ/L (ref 8–16)
AST SERPL-CCNC: 28 U/L (ref 5–40)
ATYPICAL LYMPHOCYTES: ABNORMAL %
BASOPHILIA: ABNORMAL
BASOPHILS # BLD: 0.9 %
BASOPHILS ABSOLUTE: 0 THOU/MM3 (ref 0–0.1)
BILIRUB SERPL-MCNC: 0.3 MG/DL (ref 0.3–1.2)
BILIRUBIN DIRECT: < 0.2 MG/DL (ref 0–0.3)
BUN BLDV-MCNC: 5 MG/DL (ref 7–22)
CALCIUM SERPL-MCNC: 9.2 MG/DL (ref 8.5–10.5)
CHLORIDE BLD-SCNC: 104 MEQ/L (ref 98–111)
CO2: 26 MEQ/L (ref 23–33)
CREAT SERPL-MCNC: 0.7 MG/DL (ref 0.4–1.2)
EOSINOPHIL # BLD: 0 %
EOSINOPHILS ABSOLUTE: 0 THOU/MM3 (ref 0–0.4)
ERYTHROCYTE [DISTWIDTH] IN BLOOD BY AUTOMATED COUNT: 15.2 % (ref 11.5–14.5)
ERYTHROCYTE [DISTWIDTH] IN BLOOD BY AUTOMATED COUNT: 59.2 FL (ref 35–45)
GFR SERPL CREATININE-BSD FRML MDRD: 85 ML/MIN/1.73M2
GLUCOSE BLD-MCNC: 93 MG/DL (ref 70–108)
HCT VFR BLD CALC: 35.1 % (ref 37–47)
HEMOGLOBIN: 10.6 GM/DL (ref 12–16)
IMMATURE GRANS (ABS): 0.05 THOU/MM3 (ref 0–0.07)
IMMATURE GRANULOCYTES: 2.2 %
LYMPHOCYTES # BLD: 30.7 %
LYMPHOCYTES ABSOLUTE: 0.7 THOU/MM3 (ref 1–4.8)
MAGNESIUM: 2 MG/DL (ref 1.6–2.4)
MCH RBC QN AUTO: 31.8 PG (ref 26–33)
MCHC RBC AUTO-ENTMCNC: 30.2 GM/DL (ref 32.2–35.5)
MCV RBC AUTO: 105.4 FL (ref 81–99)
MONOCYTES # BLD: 43.9 %
MONOCYTES ABSOLUTE: 1 THOU/MM3 (ref 0.4–1.3)
NUCLEATED RED BLOOD CELLS: 1 /100 WBC
PLATELET # BLD: 45 THOU/MM3 (ref 130–400)
PLATELET ESTIMATE: ABNORMAL
PMV BLD AUTO: 11.9 FL (ref 9.4–12.4)
POTASSIUM SERPL-SCNC: 3.6 MEQ/L (ref 3.5–5.2)
RBC # BLD: 3.33 MILL/MM3 (ref 4.2–5.4)
SCAN OF BLOOD SMEAR: NORMAL
SEG NEUTROPHILS: 22.3 %
SEGMENTED NEUTROPHILS ABSOLUTE COUNT: 0.5 THOU/MM3 (ref 1.8–7.7)
SODIUM BLD-SCNC: 137 MEQ/L (ref 135–145)
TOTAL PROTEIN: 6.4 G/DL (ref 6.1–8)
WBC # BLD: 2.3 THOU/MM3 (ref 4.8–10.8)

## 2021-03-27 PROCEDURE — 6360000002 HC RX W HCPCS: Performed by: PHYSICIAN ASSISTANT

## 2021-03-27 PROCEDURE — 6370000000 HC RX 637 (ALT 250 FOR IP): Performed by: PHYSICIAN ASSISTANT

## 2021-03-27 PROCEDURE — 36415 COLL VENOUS BLD VENIPUNCTURE: CPT

## 2021-03-27 PROCEDURE — 2500000003 HC RX 250 WO HCPCS: Performed by: FAMILY MEDICINE

## 2021-03-27 PROCEDURE — 6370000000 HC RX 637 (ALT 250 FOR IP): Performed by: FAMILY MEDICINE

## 2021-03-27 PROCEDURE — 82248 BILIRUBIN DIRECT: CPT

## 2021-03-27 PROCEDURE — 99232 SBSQ HOSP IP/OBS MODERATE 35: CPT | Performed by: FAMILY MEDICINE

## 2021-03-27 PROCEDURE — 2580000003 HC RX 258: Performed by: PHYSICIAN ASSISTANT

## 2021-03-27 PROCEDURE — 85025 COMPLETE CBC W/AUTO DIFF WBC: CPT

## 2021-03-27 PROCEDURE — 2580000003 HC RX 258: Performed by: FAMILY MEDICINE

## 2021-03-27 PROCEDURE — 2580000003 HC RX 258: Performed by: INTERNAL MEDICINE

## 2021-03-27 PROCEDURE — 80053 COMPREHEN METABOLIC PANEL: CPT

## 2021-03-27 PROCEDURE — 83735 ASSAY OF MAGNESIUM: CPT

## 2021-03-27 PROCEDURE — 2060000000 HC ICU INTERMEDIATE R&B

## 2021-03-27 PROCEDURE — 6360000002 HC RX W HCPCS: Performed by: INTERNAL MEDICINE

## 2021-03-27 PROCEDURE — 6360000002 HC RX W HCPCS: Performed by: SPECIALIST

## 2021-03-27 RX ADMIN — PIPERACILLIN AND TAZOBACTAM 3375 MG: 3; .375 INJECTION, POWDER, LYOPHILIZED, FOR SOLUTION INTRAVENOUS at 12:47

## 2021-03-27 RX ADMIN — AMLODIPINE BESYLATE 5 MG: 5 TABLET ORAL at 08:45

## 2021-03-27 RX ADMIN — NYSTATIN 500000 UNITS: 100000 SUSPENSION ORAL at 20:21

## 2021-03-27 RX ADMIN — SODIUM CHLORIDE, PRESERVATIVE FREE 10 ML: 5 INJECTION INTRAVENOUS at 08:53

## 2021-03-27 RX ADMIN — SENNOSIDES 8.6 MG: 8.6 TABLET, FILM COATED ORAL at 20:21

## 2021-03-27 RX ADMIN — TBO-FILGRASTIM 480 MCG: 480 INJECTION, SOLUTION SUBCUTANEOUS at 18:37

## 2021-03-27 RX ADMIN — SODIUM CHLORIDE, PRESERVATIVE FREE 10 ML: 5 INJECTION INTRAVENOUS at 20:21

## 2021-03-27 RX ADMIN — Medication 1250 MG: at 12:41

## 2021-03-27 RX ADMIN — MICONAZOLE NITRATE: 20 POWDER TOPICAL at 20:20

## 2021-03-27 RX ADMIN — NYSTATIN 500000 UNITS: 100000 SUSPENSION ORAL at 08:46

## 2021-03-27 RX ADMIN — ACETAMINOPHEN 650 MG: 325 TABLET ORAL at 18:37

## 2021-03-27 RX ADMIN — PIPERACILLIN AND TAZOBACTAM 3375 MG: 3; .375 INJECTION, POWDER, LYOPHILIZED, FOR SOLUTION INTRAVENOUS at 20:21

## 2021-03-27 RX ADMIN — MAGNESIUM GLUCONATE 500 MG ORAL TABLET 400 MG: 500 TABLET ORAL at 08:45

## 2021-03-27 RX ADMIN — SODIUM CHLORIDE, PRESERVATIVE FREE 10 ML: 5 INJECTION INTRAVENOUS at 08:46

## 2021-03-27 RX ADMIN — RISPERIDONE 0.5 MG: 0.25 TABLET ORAL at 20:21

## 2021-03-27 RX ADMIN — LEVETIRACETAM 1000 MG: 500 TABLET, FILM COATED ORAL at 08:45

## 2021-03-27 RX ADMIN — ONDANSETRON 4 MG: 2 INJECTION INTRAMUSCULAR; INTRAVENOUS at 16:37

## 2021-03-27 RX ADMIN — LOSARTAN POTASSIUM 100 MG: 100 TABLET, FILM COATED ORAL at 08:45

## 2021-03-27 RX ADMIN — RISPERIDONE 0.5 MG: 0.25 TABLET ORAL at 08:45

## 2021-03-27 RX ADMIN — ATORVASTATIN CALCIUM 20 MG: 20 TABLET, FILM COATED ORAL at 20:21

## 2021-03-27 RX ADMIN — LEVETIRACETAM 1000 MG: 500 TABLET, FILM COATED ORAL at 20:21

## 2021-03-27 RX ADMIN — PIPERACILLIN AND TAZOBACTAM 3375 MG: 3; .375 INJECTION, POWDER, LYOPHILIZED, FOR SOLUTION INTRAVENOUS at 03:40

## 2021-03-27 RX ADMIN — NYSTATIN 500000 UNITS: 100000 SUSPENSION ORAL at 15:55

## 2021-03-27 RX ADMIN — MAGNESIUM GLUCONATE 500 MG ORAL TABLET 400 MG: 500 TABLET ORAL at 20:21

## 2021-03-27 RX ADMIN — Medication 1250 MG: at 01:30

## 2021-03-27 ASSESSMENT — PAIN DESCRIPTION - PAIN TYPE: TYPE: ACUTE PAIN;SURGICAL PAIN

## 2021-03-27 ASSESSMENT — PAIN SCALES - GENERAL: PAINLEVEL_OUTOF10: 8

## 2021-03-27 ASSESSMENT — PAIN SCALES - WONG BAKER: WONGBAKER_NUMERICALRESPONSE: 4

## 2021-03-27 ASSESSMENT — PAIN DESCRIPTION - LOCATION: LOCATION: HEAD

## 2021-03-27 NOTE — PROGRESS NOTES
Hospitalist Progress Note      Patient:  [facility]    Unit/Bed:4A-16/016-A  YOB: 1960  MRN: [id number]   Acct: [de-identified]   PCP: Doreen Wilks MD  Date of Admission: 3/17/2021      Chief Complaint: AMS    Initial H and P:-    \"61 y.o. 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 History is obtained from chart review and speaking with the ED provider as family left earlier in the evening. Patient does not respond to questions and mumbles to herself during exam.     Per family, patient has had worsening neurological decline since December, now unable to walk. Last Sunday, she began having AMS without any obvious signs or symptoms of infection. She was seen by her neurologist, who is located near [place] in [place], who had some concern about her shunt malfunctioning. Patient does open her eyes to sound, but does not respond in any other way.     CBC was significant for neutropenia and repeated. WBCs were 0.4 and 0.3 on repeat. Associated with fever. Evidence of UTI. No other evidence of infection on imaging or skin exam.  Given vanc/zosyn in ED.     Imaging on arrival at [facility] showed moderate ventriculomegaly, slightly progressed compared to imaging from 3/11/2020. Some subcortical white matter hypo-attenuation demonstrated at the level of he left frontal lobe, which could represent an age-indeterminate infarct. Some fluid noted in pelvis near shunt, could benefit from paracentesis and culture. \"    3/19: No acute events overnight. Per SW who spoke with NH, nursing home was speaking to family about hospice. Pt is following commands. Pt does not give good conversation. Pt not complaining of pain. 3/20: No acute events overnight.  Pt is alert but lethargic. Shuntogram showed possible occulusion. 3/21: No acute events overnight. Pt is more alert and talkative today. Pt understands that she will undergo surgery tomorrow. No issues or complaints. 3/22: No acute events overnight. Pt underwent surgery this AM for  shunt replacement, pt tolerated surgery well. No issues or complaints. Pt more awake. Subjective (past 24 hours):     Patient seen and examined. Pt is tearful during my rounds. She reports that it's still sore on her abdomen. She denies HA, chest pain, sob, palpitations, N/V.  Per RN, pt had loose stool today ( pt is on stool softener, now on hold)      Medications:  Reviewed    Infusion Medications     Scheduled Medications    lidocaine 1 % injection  5 mL Intradermal Once    sodium chloride flush  10 mL Intravenous 2 times per day    magnesium oxide  400 mg Oral BID    lidocaine 1 % injection  5 mL Intradermal Once    sodium chloride flush  10 mL Intravenous 2 times per day    vancomycin  1,250 mg Intravenous Q12H    vancomycin (VANCOCIN) intermittent dosing (placeholder)   Other RX Placeholder    [Held by provider] enoxaparin  40 mg Subcutaneous Q12H    amLODIPine  5 mg Oral Daily    atorvastatin  20 mg Oral Nightly    losartan  100 mg Oral Daily    risperiDONE  0.5 mg Oral BID    senna  1 tablet Oral BID    levETIRAcetam  1,000 mg Oral BID    calcium replacement protocol   Other RX Placeholder    piperacillin-tazobactam  3,375 mg Intravenous Q8H    nystatin  5 mL Mouth/Throat 4x Daily     PRN Meds: sodium chloride flush, hydrALAZINE, sodium chloride flush, promethazine **OR** ondansetron, HYDROcodone 5 mg - acetaminophen, HYDROmorphone, acetaminophen, potassium chloride **OR** potassium alternative oral replacement **OR** potassium chloride, polyethylene glycol, simethicone      Intake/Output Summary (Last 24 hours) at 3/27/2021 1013  Last data filed at 3/27/2021 0840  Gross per 24 hour   Intake 2101.85 ml Output 2700 ml   Net -598.15 ml       Diet:  DIET DYSPHAGIA PUREED; Low Microbial, Lactose Controlled  Dietary Nutrition Supplements: Standard High Calorie Oral Supplement    Exam:  BP (!) 143/76   Pulse 70   Temp 98.9 °F (37.2 °C) (Oral)   Resp 20   Ht 5' 6\" (1.676 m)   Wt 294 lb 9.6 oz (133.6 kg)   SpO2 97%   BMI 47.55 kg/m²     General appearance: Chronically ill appearing white female  HEENT: Pupils equal, round, and reactive to light. Conjunctivae clear. (+) staples on right temple, looks clean, no incision nor drainage. Neck: Supple, with full range of motion. No jugular venous distention. Trachea midline. Respiratory:  Normal respiratory effort on RA. Diminished air entry on lung bases, no rales, no rhonchi, no wheezing   Cardiovascular: normal rate, regular rhythm with normal S1/S2 without murmurs, rubs or gallops. Abdomen: (+) staples on right mid abdomen, looks clean, no erythema, no drainage, non-distended with normal bowel sounds, (+) mid abdomen tenderness near the staples area  Musculoskeletal: passive and active ROM x 4 extremities. Skin: No rashes or lesions. Neurological exam reveals alert, oriented x 3 ( knows name and bday, she knows that she's at [facility])), normal speech, no focal findings or movement disorder noted, cranial nerves II through XII intact, motor and sensory grossly normal bilaterally. Exam of extremities: peripheral pulses normal, (+) grade 1 pitting bipedal edema, (+) grade 1 pitting edema on both legs ( from below knees to distal legs), no clubbing or cyanosis.  SCD in placed on both legs          Labs:   Recent Labs     03/25/21 0417 03/26/21  0344 03/27/21  0403   WBC 2.6* 2.2* 2.3*   HGB 10.6* 9.3* 10.6*   HCT 33.3* 30.0* 35.1*   PLT 50* 44* 45*     Recent Labs     03/25/21 0417 03/25/21  2256 03/26/21  0344 03/27/21  0403     --  136 137   K 2.8* 3.2* 3.5 3.6     --  103 104   CO2 27  --  26 26   BUN 4*  --  4* 5*   CREATININE 0.8  --  0.6 0.7 CALCIUM 9.0  --  8.6 9.2     Recent Labs     03/24/21  1455 03/27/21  0403   AST 39 28   ALT 28 17   BILIDIR <0.2 <0.2   BILITOT 0.5 0.3   ALKPHOS 245* 196*     Microbiology:    Blood culture #1:   Lab Results   Component Value Date    BC No growth-preliminary No growth  03/17/2021     Urinalysis:      Lab Results   Component Value Date    NITRU NEGATIVE 03/17/2021    WBCUA 10-15 03/17/2021    BACTERIA MANY 03/17/2021    RBCUA 3-5 03/17/2021    BLOODU NEGATIVE 03/17/2021    GLUCOSEU NEGATIVE 03/17/2021       Radiology:  XR ABDOMEN (KUB) (SINGLE AP VIEW)   Final Result   No acute findings. **This report has been created using voice recognition software. It may contain minor errors which are inherent in voice recognition technology. **         Final report electronically signed by Dr. Yadi Rangel on 3/26/2021 2:58 PM      CT HEAD WO CONTRAST   Final Result    Interval right frontal approach shunt revision with catheter tip crossing the midline and tip in the left thalamus with postsurgical pneumocephalus in the right lateral ventricle with mild interval decrease in size of the ventricles compared to prior    CT. **This report has been created using voice recognition software. It may contain minor errors which are inherent in voice recognition technology. **      Final report electronically signed by Dr. Piedad De Jesus MD on 3/23/2021 10:45 AM      RADIOLOGY REPORT   Final Result      NM CSF SHUNT EVALUATION   Final Result      Absence of activity in distal shunt catheter tubing and in the pelvis, highly suspicious for occlusion. Final report electronically signed by Dr. Deandra Ocampo on 3/19/2021 4:01 PM      CT CHEST WO CONTRAST   Final Result   1. No acute intrathoracic findings. 2.  Minimal consolidation along the right lung base posteriorly, likely    representing atelectasis. This document has been electronically signed by:  Vita Chaparro MD on    03/17/2021 11:10 PM All CTs at this facility use dose modulation techniques and iterative    reconstructions, and/or weight-based dosing   when appropriate to reduce radiation to a low as reasonably achievable. CT ABDOMEN PELVIS WO CONTRAST Additional Contrast? None   Final Result   1. No acute intra-abdominal or pelvic findings. No bowel obstruction. No renal obstruction. 2.  Small amount of fluid present within the lower abdomen/pelvis. This    is likely secondary to the ventriculoperitoneal shunt which is in close    proximity to this region. This document has been electronically signed by: Greer Vela MD on    03/17/2021 11:03 PM      All CTs at this facility use dose modulation techniques and iterative    reconstructions, and/or weight-based dosing   when appropriate to reduce radiation to a low as reasonably achievable. XR SHUNT SERIES PLACEMENT (<4 VIEWS)   Final Result   1. There is a right-sided  shunt catheter. The visualized portions of the catheter. Intact. However, there is partial exclusion of the inferior aspect of the catheter overlying the lower pelvis. 2. There are mildly dilated gas-filled small bowel loops overlying the mid abdomen. Differential considerations would include a possible focal ileus or partial small bowel obstruction. Consider further characterization with CT. **This report has been created using voice recognition software. It may contain minor errors which are inherent in voice recognition technology. **      Final report electronically signed by Dr. Miladis Thomas on 3/17/2021 8:39 PM      CT HEAD WO CONTRAST   Final Result   1. No acute intracranial hemorrhage or mass effect. 2. There is subcortical white matter hypoattenuation demonstrated at the level of the left frontal lobe as seen on axial images 20/7/2029. This may represent an age-indeterminate infarct.  Although a remote infarct is favored given the appearance of    encephalomalacia on coronal series image 22.      3. There is moderate ventriculomegaly demonstrated. The overall degree of ventriculomegaly appears slightly progressed at the level of the anterior left lateral ventricle when compared to the prior examination dated 3/11/2020.      4. There is an anterior rightward approach ventricular peritoneal shunt catheter with the distal tip abutting the septum pellucidum on axial image 20. This is similar in position when compared to prior examination. This slightly deviates the septum    pellucidum towards the left by approximately 4 mm. However, there is no evidence of midline shift at the level of the pineal gland. **This report has been created using voice recognition software. It may contain minor errors which are inherent in voice recognition technology. **      Final report electronically signed by Dr. Jonathon Kaye on 3/17/2021 8:34 PM      CT HEAD WO CONTRAST    (Results Pending)       Assessment/Plan:      1.  Shunt Malfucntion: s/p revision  shunt placement on 3/22/2021. Neurosurgery & General Surgery together on surgery. Appreciate specialists input. Dressing c/d/i. Neuro checks per protocol. On 3/26, NS ( Dr. Estelle Lazaro) and GS (Dr. Bill Concepcion) are okay for discharge today. NS recommend f/u in his office 2 weeks after procedure with repeat CT head prior appt. GS recommend f/u in his office in 7-10 days   2. Acute encephalopathy: Likely due to  shunt malfunction, improving. Will continue to monitor post-operatively. Neuro checks. Pt is waxing and waning with mentation. 3. Sepsis, unknown etiology, resolving: Urine culture contaminant & Blood cultures NG. CSF showed no growth. IVF. Continue IV ABX. On 3/22 night, patient spiked a fever of 102. Tylenol given. Leukopenia improving. lactate normal. Continue IV vanco  and Zosyn ( start date 3/17/2021 for both abx). ID on-board.  I spoke with Dr. Sarah Butler (ID) on-board, who recommend switch IV abx to Augmentin 875 mg PO BID and Bactrim DS BID for 1 week on discharge. Dr. Ming Lord is okay for discharge today. Appreciate ID input. 4. History of ruptured brain aneurysm status post  shunt 2019:  Neurosurgery on-board. Shuntogram shows high suspicious for occlusion,  shunt revision 3/22/2021. Neurosurgery & General Surgery consulted. 5. Severe Neutropenia with fever, improving: on 3/26, .8 cells/ul, today, .9 cells/ul. Smudge cells. Afebrile since 3/23/2021. Hematology on-board, I spoke with Dr. Awilda Gonzales on 3/26/2021 over the phone, he recommend pt to stay IP and recheck CBC in am, Dr. Awilda Gonzales wants [address] at least 1000 prior discharge. ANC slightly worse today, awaiting further recs from Dr. Awilda Gonzales. Appreciate hematology input. 6. Pancytopenia: Hematology started Granix. Appreciate hematology input. Leukopenia improving, anemia stable, thrombocytopenia slightly worsening. Cont to hold lovenox. Cont SCD for DVT prophylaxis  7. Generalized abd pain: KUB normal. The pain is mostly located on the staples area. Staple area looks clean  8. Elevated LFTs, improving: No known baseline. LFTs still elevated but improving. CT A/P did not any acute issues of liver. 9. Dysphagia: cont dysphagia diet. ST on-board, appreciate input. 10. Moderate expressive/receptive language impairment: ST on-board  11. Hyponatremia, mild, resolved  12. B/L LE edema: off IV fluids  13. Hypokalemia, improving: unclear etiology. Potassium Replacement Protocol. Magnesium 2.0 today. Started magnesium oxide oral on 3/24/2021, given 1 dose of Magnesium 1 gram IV on 3/25. EKG reviewed. Tele   14. Hypoalbuminemia/low BUN, at risk for malnutrition: dietitian on-board, appreciate input   15. History of seizures: cont Keppra IV  16. Essential hypertension: BP fluctuates. BP better today. Continue home medications losartan and amlodipine for now, re-assess tomorrow. Continue IV hydralazine as needed  17. Hyperlipidemia: Continue statin.    18. Possible depression: pt was tearful yesterday. psych on-board, who recommend cont risperdal for now. Appreciate psych input       Disposition: Plan is return to [address] [place] [address] once medically stable.  Awaiting further recommendation from Dr. Lanny Stoddard regarding leukopenia ([address] is slightly worse today)       Electronically signed by Emmy Hudson MD on 3/27/2021 at 10:13 AM

## 2021-03-27 NOTE — PROGRESS NOTES
RN spoke with Dr. Yogi Martines, Hematology, regarding plan for discharge. Dr. Yogi Martines ordered Granix 500 mcg daily since Neulasta is not available for inpatient use by the pharmacy. Dr. Yogi Martines will review the patient's progress on Monday 03/29/21 to see if discharge back to the ECF is possible with outpatient follow up.

## 2021-03-27 NOTE — PLAN OF CARE
level of care  Outcome: Ongoing  Note: Discharge planning in progress at this time. Problem: Pain:  Goal: Pain level will decrease  Description: Pain level will decrease  Outcome: Ongoing  Note: Patient c/o a headache during this shift. Patient medicated per MAR. Will continue to monitor. Problem: Physical Regulation:  Goal: Ability to maintain a body temperature in the normal range will improve  Description: Ability to maintain a body temperature in the normal range will improve  Outcome: Ongoing  Note: Patient afebrile during this shift. Problem: Physical Regulation:  Goal: Postoperative complications will be avoided or minimized  Description: Postoperative complications will be avoided or minimized  Outcome: Ongoing     Problem: Nutrition  Goal: Optimal nutrition therapy  Outcome: Ongoing  Note: Patient on a dysphagia puree diet. Patient refused dinner during this shift. Problem: Musculor/Skeletal Functional Status  Goal: Highest potential functional level  Outcome: Ongoing    Care plan reviewed with patient. Patient verbalizes understanding of the plan of care and contributes to goal setting.

## 2021-03-28 LAB
ANION GAP SERPL CALCULATED.3IONS-SCNC: 4 MEQ/L (ref 8–16)
ANISOCYTOSIS: PRESENT
BASOPHILIA: ABNORMAL
BASOPHILS # BLD: 0.5 %
BASOPHILS ABSOLUTE: 0 THOU/MM3 (ref 0–0.1)
BUN BLDV-MCNC: 7 MG/DL (ref 7–22)
CALCIUM SERPL-MCNC: 9.2 MG/DL (ref 8.5–10.5)
CHLORIDE BLD-SCNC: 104 MEQ/L (ref 98–111)
CO2: 30 MEQ/L (ref 23–33)
CREAT SERPL-MCNC: 0.8 MG/DL (ref 0.4–1.2)
EOSINOPHIL # BLD: 0 %
EOSINOPHILS ABSOLUTE: 0 THOU/MM3 (ref 0–0.4)
ERYTHROCYTE [DISTWIDTH] IN BLOOD BY AUTOMATED COUNT: 15.2 % (ref 11.5–14.5)
ERYTHROCYTE [DISTWIDTH] IN BLOOD BY AUTOMATED COUNT: 57.2 FL (ref 35–45)
GFR SERPL CREATININE-BSD FRML MDRD: 73 ML/MIN/1.73M2
GLUCOSE BLD-MCNC: 103 MG/DL (ref 70–108)
HCT VFR BLD CALC: 31 % (ref 37–47)
HEMOGLOBIN: 9.5 GM/DL (ref 12–16)
IMMATURE GRANS (ABS): 0.19 THOU/MM3 (ref 0–0.07)
IMMATURE GRANULOCYTES: 4.6 %
LYMPHOCYTES # BLD: 19.4 %
LYMPHOCYTES ABSOLUTE: 0.8 THOU/MM3 (ref 1–4.8)
MCH RBC QN AUTO: 31.3 PG (ref 26–33)
MCHC RBC AUTO-ENTMCNC: 30.6 GM/DL (ref 32.2–35.5)
MCV RBC AUTO: 102 FL (ref 81–99)
MONOCYTES # BLD: 34.8 %
MONOCYTES ABSOLUTE: 1.5 THOU/MM3 (ref 0.4–1.3)
NUCLEATED RED BLOOD CELLS: 1 /100 WBC
PLATELET # BLD: 46 THOU/MM3 (ref 130–400)
PLATELET ESTIMATE: ABNORMAL
PMV BLD AUTO: 13.1 FL (ref 9.4–12.4)
POTASSIUM SERPL-SCNC: 3.6 MEQ/L (ref 3.5–5.2)
RBC # BLD: 3.04 MILL/MM3 (ref 4.2–5.4)
SCAN OF BLOOD SMEAR: NORMAL
SEG NEUTROPHILS: 40.7 %
SEGMENTED NEUTROPHILS ABSOLUTE COUNT: 1.7 THOU/MM3 (ref 1.8–7.7)
SODIUM BLD-SCNC: 138 MEQ/L (ref 135–145)
TOXIC GRANULATION: PRESENT
WBC # BLD: 4.2 THOU/MM3 (ref 4.8–10.8)

## 2021-03-28 PROCEDURE — 6360000002 HC RX W HCPCS: Performed by: PHYSICIAN ASSISTANT

## 2021-03-28 PROCEDURE — 6360000002 HC RX W HCPCS: Performed by: SPECIALIST

## 2021-03-28 PROCEDURE — 6370000000 HC RX 637 (ALT 250 FOR IP): Performed by: PHYSICIAN ASSISTANT

## 2021-03-28 PROCEDURE — 36415 COLL VENOUS BLD VENIPUNCTURE: CPT

## 2021-03-28 PROCEDURE — 6360000002 HC RX W HCPCS: Performed by: INTERNAL MEDICINE

## 2021-03-28 PROCEDURE — 2580000003 HC RX 258: Performed by: PHYSICIAN ASSISTANT

## 2021-03-28 PROCEDURE — 99232 SBSQ HOSP IP/OBS MODERATE 35: CPT | Performed by: FAMILY MEDICINE

## 2021-03-28 PROCEDURE — 80048 BASIC METABOLIC PNL TOTAL CA: CPT

## 2021-03-28 PROCEDURE — 2580000003 HC RX 258: Performed by: INTERNAL MEDICINE

## 2021-03-28 PROCEDURE — 85025 COMPLETE CBC W/AUTO DIFF WBC: CPT

## 2021-03-28 PROCEDURE — 2580000003 HC RX 258: Performed by: FAMILY MEDICINE

## 2021-03-28 PROCEDURE — 2060000000 HC ICU INTERMEDIATE R&B

## 2021-03-28 PROCEDURE — 6370000000 HC RX 637 (ALT 250 FOR IP): Performed by: FAMILY MEDICINE

## 2021-03-28 RX ORDER — AMOXICILLIN AND CLAVULANATE POTASSIUM 875; 125 MG/1; MG/1
1 TABLET, FILM COATED ORAL EVERY 12 HOURS SCHEDULED
Status: DISCONTINUED | OUTPATIENT
Start: 2021-03-28 | End: 2021-03-29 | Stop reason: HOSPADM

## 2021-03-28 RX ORDER — SULFAMETHOXAZOLE AND TRIMETHOPRIM 800; 160 MG/1; MG/1
1 TABLET ORAL EVERY 12 HOURS SCHEDULED
Status: DISCONTINUED | OUTPATIENT
Start: 2021-03-28 | End: 2021-03-29 | Stop reason: HOSPADM

## 2021-03-28 RX ADMIN — RISPERIDONE 0.5 MG: 0.25 TABLET ORAL at 20:59

## 2021-03-28 RX ADMIN — SODIUM CHLORIDE, PRESERVATIVE FREE 10 ML: 5 INJECTION INTRAVENOUS at 21:00

## 2021-03-28 RX ADMIN — MAGNESIUM GLUCONATE 500 MG ORAL TABLET 400 MG: 500 TABLET ORAL at 20:59

## 2021-03-28 RX ADMIN — SODIUM CHLORIDE, PRESERVATIVE FREE 10 ML: 5 INJECTION INTRAVENOUS at 09:23

## 2021-03-28 RX ADMIN — LOSARTAN POTASSIUM 100 MG: 100 TABLET, FILM COATED ORAL at 09:21

## 2021-03-28 RX ADMIN — AMOXICILLIN AND CLAVULANATE POTASSIUM 1 TABLET: 875; 125 TABLET, FILM COATED ORAL at 20:59

## 2021-03-28 RX ADMIN — TBO-FILGRASTIM 480 MCG: 480 INJECTION, SOLUTION SUBCUTANEOUS at 17:32

## 2021-03-28 RX ADMIN — LEVETIRACETAM 1000 MG: 500 TABLET, FILM COATED ORAL at 09:22

## 2021-03-28 RX ADMIN — MICONAZOLE NITRATE: 20 POWDER TOPICAL at 20:59

## 2021-03-28 RX ADMIN — Medication 1250 MG: at 12:28

## 2021-03-28 RX ADMIN — NYSTATIN 500000 UNITS: 100000 SUSPENSION ORAL at 14:00

## 2021-03-28 RX ADMIN — ACETAMINOPHEN 650 MG: 325 TABLET ORAL at 13:14

## 2021-03-28 RX ADMIN — PIPERACILLIN AND TAZOBACTAM 3375 MG: 3; .375 INJECTION, POWDER, LYOPHILIZED, FOR SOLUTION INTRAVENOUS at 05:00

## 2021-03-28 RX ADMIN — SENNOSIDES 8.6 MG: 8.6 TABLET, FILM COATED ORAL at 20:59

## 2021-03-28 RX ADMIN — RISPERIDONE 0.5 MG: 0.25 TABLET ORAL at 09:22

## 2021-03-28 RX ADMIN — ATORVASTATIN CALCIUM 20 MG: 20 TABLET, FILM COATED ORAL at 20:59

## 2021-03-28 RX ADMIN — NYSTATIN 500000 UNITS: 100000 SUSPENSION ORAL at 09:22

## 2021-03-28 RX ADMIN — SODIUM CHLORIDE, PRESERVATIVE FREE 10 ML: 5 INJECTION INTRAVENOUS at 09:22

## 2021-03-28 RX ADMIN — NYSTATIN 500000 UNITS: 100000 SUSPENSION ORAL at 21:00

## 2021-03-28 RX ADMIN — MAGNESIUM GLUCONATE 500 MG ORAL TABLET 400 MG: 500 TABLET ORAL at 09:21

## 2021-03-28 RX ADMIN — LEVETIRACETAM 1000 MG: 500 TABLET, FILM COATED ORAL at 20:58

## 2021-03-28 RX ADMIN — MICONAZOLE NITRATE: 20 POWDER TOPICAL at 09:23

## 2021-03-28 RX ADMIN — AMLODIPINE BESYLATE 5 MG: 5 TABLET ORAL at 09:21

## 2021-03-28 RX ADMIN — Medication 1250 MG: at 01:00

## 2021-03-28 RX ADMIN — NYSTATIN 500000 UNITS: 100000 SUSPENSION ORAL at 17:21

## 2021-03-28 RX ADMIN — SENNOSIDES 8.6 MG: 8.6 TABLET, FILM COATED ORAL at 09:22

## 2021-03-28 RX ADMIN — ONDANSETRON 4 MG: 2 INJECTION INTRAMUSCULAR; INTRAVENOUS at 09:48

## 2021-03-28 RX ADMIN — SULFAMETHOXAZOLE AND TRIMETHOPRIM 1 TABLET: 800; 160 TABLET ORAL at 20:59

## 2021-03-28 ASSESSMENT — PAIN SCALES - WONG BAKER
WONGBAKER_NUMERICALRESPONSE: 0
WONGBAKER_NUMERICALRESPONSE: 0

## 2021-03-28 ASSESSMENT — PAIN SCALES - GENERAL: PAINLEVEL_OUTOF10: 8

## 2021-03-28 NOTE — PROGRESS NOTES
Hospitalist Progress Note      Patient:  [facility]    Unit/Bed:4A-16/016-A  YOB: 1960  MRN: [id number]   Acct: [de-identified]   PCP: Krista Moon MD  Date of Admission: 3/17/2021      Chief Complaint: AMS    Initial H and P:-    \"61 y.o. 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 History is obtained from chart review and speaking with the ED provider as family left earlier in the evening. Patient does not respond to questions and mumbles to herself during exam.     Per family, patient has had worsening neurological decline since December, now unable to walk. Last Sunday, she began having AMS without any obvious signs or symptoms of infection. She was seen by her neurologist, who is located near their hometown in [place], who had some concern about her shunt malfunctioning. Patient does open her eyes to sound, but does not respond in any other way.     CBC was significant for neutropenia and repeated. WBCs were 0.4 and 0.3 on repeat. Associated with fever. Evidence of UTI. No other evidence of infection on imaging or skin exam.  Given vanc/zosyn in ED.     Imaging on arrival at [facility] showed moderate ventriculomegaly, slightly progressed compared to imaging from 3/11/2020. Some subcortical white matter hypo-attenuation demonstrated at the level of he left frontal lobe, which could represent an age-indeterminate infarct. Some fluid noted in pelvis near shunt, could benefit from paracentesis and culture. \"    3/19: No acute events overnight. Per SW who spoke with NH, nursing home was speaking to family about hospice. Pt is following commands. Pt does not give good conversation. Pt not complaining of pain. 3/20: No acute events overnight.  Pt is alert but lethargic. Shuntogram showed possible occulusion. 3/21: No acute events overnight. Pt is more alert and talkative today. Pt understands that she will undergo surgery tomorrow. No issues or complaints. 3/22: No acute events overnight. Pt underwent surgery this AM for  shunt replacement, pt tolerated surgery well. No issues or complaints. Pt more awake. \"    Subjective (past 24 hours):     Patient seen and examined. Pt's brother at bedside during my rounds. Pt is tearful during my rounds. Pt reports it hurts on the staple area on her head and on abdomen. She denies chest pain, sob, palpitations, N/V.       Medications:  Reviewed    Infusion Medications     Scheduled Medications    tbo-filgrastim  480 mcg Subcutaneous QPM    miconazole   Topical BID    lidocaine 1 % injection  5 mL Intradermal Once    sodium chloride flush  10 mL Intravenous 2 times per day    magnesium oxide  400 mg Oral BID    lidocaine 1 % injection  5 mL Intradermal Once    sodium chloride flush  10 mL Intravenous 2 times per day    vancomycin  1,250 mg Intravenous Q12H    vancomycin (VANCOCIN) intermittent dosing (placeholder)   Other RX Placeholder    [Held by provider] enoxaparin  40 mg Subcutaneous Q12H    amLODIPine  5 mg Oral Daily    atorvastatin  20 mg Oral Nightly    losartan  100 mg Oral Daily    risperiDONE  0.5 mg Oral BID    senna  1 tablet Oral BID    levETIRAcetam  1,000 mg Oral BID    calcium replacement protocol   Other RX Placeholder    piperacillin-tazobactam  3,375 mg Intravenous Q8H    nystatin  5 mL Mouth/Throat 4x Daily     PRN Meds: sodium chloride flush, hydrALAZINE, sodium chloride flush, promethazine **OR** ondansetron, HYDROcodone 5 mg - acetaminophen, HYDROmorphone, acetaminophen, potassium chloride **OR** potassium alternative oral replacement **OR** potassium chloride, polyethylene glycol, simethicone      Intake/Output Summary (Last 24 hours) at 3/28/2021 1228  Last data filed at 3/28/2021 1104  Gross per 24 hour   Intake 1719.42 ml   Output 3975 ml   Net -2255.58 ml       Diet:  DIET DYSPHAGIA PUREED; Low Microbial, Lactose Controlled  Dietary Nutrition Supplements: Standard High Calorie Oral Supplement    Exam:  BP (!) 145/64   Pulse 75   Temp 98.8 °F (37.1 °C) (Oral)   Resp 16   Ht 5' 6\" (1.676 m)   Wt 291 lb (132 kg)   SpO2 96%   BMI 46.97 kg/m²     General appearance: Chronically ill appearing white female  HEENT: Pupils equal, round, and reactive to light. Conjunctivae clear. (+) staples on right temple, looks clean, no incision nor drainage. Neck: Supple, with full range of motion. No jugular venous distention. Trachea midline. Respiratory:  Normal respiratory effort on RA. Diminished air entry on lung bases, no rales, no rhonchi, no wheezing   Cardiovascular: normal rate, regular rhythm with normal S1/S2 without murmurs, rubs or gallops. Abdomen: (+) staples on right mid abdomen, looks clean, no erythema, no drainage, non-distended with normal bowel sounds, (+) mid abdomen tenderness near the staples area  Musculoskeletal: passive and active ROM x 4 extremities. Skin: No rashes or lesions. Neurological exam reveals alert, oriented x 3 ( knows name and bday, she knows that she's at [facility])), normal speech, no focal findings or movement disorder noted, cranial nerves II through XII intact, motor and sensory grossly normal bilaterally. Exam of extremities: peripheral pulses normal, (+) grade 1 pitting bipedal edema, (+) grade 1 pitting edema on both legs ( from below knees to distal legs), no clubbing or cyanosis.  SCD in placed on both legs          Labs:   Recent Labs     03/26/21  0344 03/27/21  0403 03/28/21  0330   WBC 2.2* 2.3* 4.2*   HGB 9.3* 10.6* 9.5*   HCT 30.0* 35.1* 31.0*   PLT 44* 45* 46*     Recent Labs     03/26/21  0344 03/27/21  0403 03/28/21  0330    137 138   K 3.5 3.6 3.6    104 104   CO2 26 26 30   BUN 4* 5* 7   CREATININE 0.6 0.7 0.8 CALCIUM 8.6 9.2 9.2     Recent Labs     03/27/21  0403   AST 28   ALT 17   BILIDIR <0.2   BILITOT 0.3   ALKPHOS 196*     Microbiology:    Blood culture #1:   Lab Results   Component Value Date    BC No growth-preliminary No growth  03/17/2021     Urinalysis:      Lab Results   Component Value Date    NITRU NEGATIVE 03/17/2021    WBCUA 10-15 03/17/2021    BACTERIA MANY 03/17/2021    RBCUA 3-5 03/17/2021    BLOODU NEGATIVE 03/17/2021    GLUCOSEU NEGATIVE 03/17/2021       Radiology:  XR ABDOMEN (KUB) (SINGLE AP VIEW)   Final Result   No acute findings. **This report has been created using voice recognition software. It may contain minor errors which are inherent in voice recognition technology. **         Final report electronically signed by Dr. Aubrey Skinner on 3/26/2021 2:58 PM      CT HEAD WO CONTRAST   Final Result    Interval right frontal approach shunt revision with catheter tip crossing the midline and tip in the left thalamus with postsurgical pneumocephalus in the right lateral ventricle with mild interval decrease in size of the ventricles compared to prior    CT. **This report has been created using voice recognition software. It may contain minor errors which are inherent in voice recognition technology. **      Final report electronically signed by Dr. Ely Vidal MD on 3/23/2021 10:45 AM      RADIOLOGY REPORT   Final Result      NM CSF SHUNT EVALUATION   Final Result      Absence of activity in distal shunt catheter tubing and in the pelvis, highly suspicious for occlusion. Final report electronically signed by Dr. Doris Vilchis on 3/19/2021 4:01 PM      CT CHEST WO CONTRAST   Final Result   1. No acute intrathoracic findings. 2.  Minimal consolidation along the right lung base posteriorly, likely    representing atelectasis. This document has been electronically signed by:  Bethany Rosales MD on    03/17/2021 11:10 PM      All CTs at this facility use dose modulation techniques and iterative    reconstructions, and/or weight-based dosing   when appropriate to reduce radiation to a low as reasonably achievable. CT ABDOMEN PELVIS WO CONTRAST Additional Contrast? None   Final Result   1. No acute intra-abdominal or pelvic findings. No bowel obstruction. No renal obstruction. 2.  Small amount of fluid present within the lower abdomen/pelvis. This    is likely secondary to the ventriculoperitoneal shunt which is in close    proximity to this region. This document has been electronically signed by: Mathew Palacio MD on    03/17/2021 11:03 PM      All CTs at this facility use dose modulation techniques and iterative    reconstructions, and/or weight-based dosing   when appropriate to reduce radiation to a low as reasonably achievable. XR SHUNT SERIES PLACEMENT (<4 VIEWS)   Final Result   1. There is a right-sided  shunt catheter. The visualized portions of the catheter. Intact. However, there is partial exclusion of the inferior aspect of the catheter overlying the lower pelvis. 2. There are mildly dilated gas-filled small bowel loops overlying the mid abdomen. Differential considerations would include a possible focal ileus or partial small bowel obstruction. Consider further characterization with CT. **This report has been created using voice recognition software. It may contain minor errors which are inherent in voice recognition technology. **      Final report electronically signed by Dr. Mauri Busby on 3/17/2021 8:39 PM      CT HEAD WO CONTRAST   Final Result   1. No acute intracranial hemorrhage or mass effect. 2. There is subcortical white matter hypoattenuation demonstrated at the level of the left frontal lobe as seen on axial images 20/7/2029. This may represent an age-indeterminate infarct.  Although a remote infarct is favored given the appearance of    encephalomalacia on coronal series image 22.      3. There is moderate ventriculomegaly demonstrated. The overall degree of ventriculomegaly appears slightly progressed at the level of the anterior left lateral ventricle when compared to the prior examination dated 3/11/2020.      4. There is an anterior rightward approach ventricular peritoneal shunt catheter with the distal tip abutting the septum pellucidum on axial image 20. This is similar in position when compared to prior examination. This slightly deviates the septum    pellucidum towards the left by approximately 4 mm. However, there is no evidence of midline shift at the level of the pineal gland. **This report has been created using voice recognition software. It may contain minor errors which are inherent in voice recognition technology. **      Final report electronically signed by Dr. Amber Koo on 3/17/2021 8:34 PM      CT HEAD WO CONTRAST    (Results Pending)       Assessment/Plan:      1.  Shunt Malfucntion: s/p revision  shunt placement on 3/22/2021. Neurosurgery & General Surgery together on surgery. Appreciate specialists input. Dressing c/d/i. Neuro checks per protocol. On 3/26, NS ( Dr. Kraig Villanueva) and GS (Dr. Angely Romero) are okay for discharge today. NS recommend f/u in his office 2 weeks after procedure with repeat CT head prior appt. GS recommend f/u in his office in 7-10 days   2. Acute encephalopathy: Likely due to  shunt malfunction, improving. Will continue to monitor post-operatively. Neuro checks. Pt is waxing and waning with mentation. 3. Sepsis, unknown etiology, resolving: Urine culture contaminant & Blood cultures NG. CSF showed no growth. IVF. Continue IV ABX. On 3/22 night, patient spiked a fever of 102. Tylenol given. Leukopenia improving. lactate normal. Continue IV vanco  and Zosyn ( start date 3/17/2021 for both abx). ID on-board. I spoke with Dr. Samanta Bauer (ID) on-board, who recommend switch IV abx to Augmentin 875 mg PO BID and Bactrim DS BID for 1 week on discharge.  Dr. Samanta Bauer is okay for discharge today. Appreciate ID input. 4. History of ruptured brain aneurysm status post  shunt 2019:  Neurosurgery on-board. Shuntogram shows high suspicious for occlusion,  shunt revision 3/22/2021. Neurosurgery & General Surgery consulted. 5. Severe Neutropenia with fever, improving: on 3/26, .8 cells/ul, then, .9 cells/ul on 3/27, now 1,709.4 cells/ul. Smudge cells. Afebrile since 3/23/2021. Hematology on-board, I spoke with Dr. Luciano Sarmiento on 3/26/2021 over the phone, he recommend pt to stay IP and recheck CBC in am, Dr. Luciano Sarmiento wants [address] at least 1000 prior discharge. Dr. Luciano Sarmiento spoke with RN yesterday and per nurse, Dr. Luciano Sarmiento will review patient tomorrow if okay for discharge back to [facility]. Awaiting further recs from Dr. Luciano Sarmiento. Appreciate hematology input. 6. Pancytopenia, improving: Hematology started Granix. Appreciate hematology input. Leukopenia improving, anemia stable, thrombocytopenia stable. Cont to hold lovenox. Cont SCD for DVT prophylaxis  7. Generalized abd pain: KUB normal. The pain is mostly located on the staples area. Staple area looks clean  8. Elevated LFTs, improving: No known baseline. LFTs still elevated but improving. CT A/P did not any acute issues of liver. 9. Dysphagia: cont dysphagia diet. ST on-board, appreciate input. 10. Moderate expressive/receptive language impairment: ST on-board  11. Hyponatremia, mild, resolved  12. B/L LE edema: off IV fluids  13. Hypokalemia, improving: unclear etiology. Potassium Replacement Protocol. Magnesium 2.0 today. Started magnesium oxide oral on 3/24/2021, given 1 dose of Magnesium 1 gram IV on 3/25. EKG reviewed. Tele   14. Hypoalbuminemia/low BUN, at risk for malnutrition: dietitian on-board, appreciate input   15. History of seizures: cont Keppra IV  16. Essential hypertension: BP fluctuates. BP better today. Continue home medications losartan and amlodipine for now, re-assess tomorrow.   Continue IV hydralazine as needed  17. Hyperlipidemia: Continue statin. 18. Possible depression: pt is still tearful. psych on-board, who recommend cont risperdal for now. Appreciate psych input       Disposition: Plan is return to [address] [place] [address] once medically stable.  Awaiting further recommendation from Dr. Karli Carbajal regarding leukopenia ([address] improving)      Electronically signed by Radha Catalan MD on 3/28/2021 at 12:28 PM

## 2021-03-28 NOTE — PROGRESS NOTES
Leukopenia is improving. Okay to discharge in am.  Needs CBC in one week to be faxed to Dr Virginia Kulkarni  She may needs a bone marrow biopsy  To rule out  MDS.

## 2021-03-28 NOTE — PROGRESS NOTES
Spoke with Dr. Rashad Knowles regarding improved WBC and ANC over 1000. He is planning for discharge Monday and will see patient then.

## 2021-03-28 NOTE — PROGRESS NOTES
Perfect serve out to psychiatry to see if reevaluation for depression can be completed today. Possible discharge today since WBC improved and ANC greater than 1000.   Page also sent to Dr. Adriel Arauz, hematology, to see if okay to discharge now with outpatient follow up

## 2021-03-29 VITALS
OXYGEN SATURATION: 92 % | BODY MASS INDEX: 47.09 KG/M2 | DIASTOLIC BLOOD PRESSURE: 57 MMHG | HEIGHT: 66 IN | TEMPERATURE: 98.3 F | SYSTOLIC BLOOD PRESSURE: 122 MMHG | HEART RATE: 88 BPM | WEIGHT: 293 LBS | RESPIRATION RATE: 17 BRPM

## 2021-03-29 LAB
ANION GAP SERPL CALCULATED.3IONS-SCNC: 6 MEQ/L (ref 8–16)
BASOPHILS # BLD: 0.9 %
BASOPHILS ABSOLUTE: 0.1 THOU/MM3 (ref 0–0.1)
BUN BLDV-MCNC: 8 MG/DL (ref 7–22)
CALCIUM SERPL-MCNC: 9.4 MG/DL (ref 8.5–10.5)
CHLORIDE BLD-SCNC: 102 MEQ/L (ref 98–111)
CO2: 29 MEQ/L (ref 23–33)
CREAT SERPL-MCNC: 0.7 MG/DL (ref 0.4–1.2)
EOSINOPHIL # BLD: 0 %
EOSINOPHILS ABSOLUTE: 0 THOU/MM3 (ref 0–0.4)
ERYTHROCYTE [DISTWIDTH] IN BLOOD BY AUTOMATED COUNT: 15.1 % (ref 11.5–14.5)
ERYTHROCYTE [DISTWIDTH] IN BLOOD BY AUTOMATED COUNT: 55.7 FL (ref 35–45)
GFR SERPL CREATININE-BSD FRML MDRD: 85 ML/MIN/1.73M2
GLUCOSE BLD-MCNC: 102 MG/DL (ref 70–108)
HCT VFR BLD CALC: 31 % (ref 37–47)
HEMOGLOBIN: 9.8 GM/DL (ref 12–16)
IMMATURE GRANS (ABS): 0.44 THOU/MM3 (ref 0–0.07)
IMMATURE GRANULOCYTES: 7.7 %
LYMPHOCYTES # BLD: 13.9 %
LYMPHOCYTES ABSOLUTE: 0.8 THOU/MM3 (ref 1–4.8)
MCH RBC QN AUTO: 31.9 PG (ref 26–33)
MCHC RBC AUTO-ENTMCNC: 31.6 GM/DL (ref 32.2–35.5)
MCV RBC AUTO: 101 FL (ref 81–99)
MONOCYTES # BLD: 26.3 %
MONOCYTES ABSOLUTE: 1.5 THOU/MM3 (ref 0.4–1.3)
NUCLEATED RED BLOOD CELLS: 0 /100 WBC
PLATELET # BLD: 54 THOU/MM3 (ref 130–400)
PMV BLD AUTO: 12.6 FL (ref 9.4–12.4)
POTASSIUM SERPL-SCNC: 3.8 MEQ/L (ref 3.5–5.2)
RBC # BLD: 3.07 MILL/MM3 (ref 4.2–5.4)
SEG NEUTROPHILS: 51.2 %
SEGMENTED NEUTROPHILS ABSOLUTE COUNT: 3 THOU/MM3 (ref 1.8–7.7)
SODIUM BLD-SCNC: 137 MEQ/L (ref 135–145)
WBC # BLD: 5.8 THOU/MM3 (ref 4.8–10.8)

## 2021-03-29 PROCEDURE — 80048 BASIC METABOLIC PNL TOTAL CA: CPT

## 2021-03-29 PROCEDURE — 6370000000 HC RX 637 (ALT 250 FOR IP): Performed by: PHYSICIAN ASSISTANT

## 2021-03-29 PROCEDURE — 36415 COLL VENOUS BLD VENIPUNCTURE: CPT

## 2021-03-29 PROCEDURE — 97110 THERAPEUTIC EXERCISES: CPT

## 2021-03-29 PROCEDURE — 2580000003 HC RX 258: Performed by: PHYSICIAN ASSISTANT

## 2021-03-29 PROCEDURE — 2580000003 HC RX 258: Performed by: FAMILY MEDICINE

## 2021-03-29 PROCEDURE — 99239 HOSP IP/OBS DSCHRG MGMT >30: CPT | Performed by: FAMILY MEDICINE

## 2021-03-29 PROCEDURE — 85025 COMPLETE CBC W/AUTO DIFF WBC: CPT

## 2021-03-29 PROCEDURE — 6370000000 HC RX 637 (ALT 250 FOR IP): Performed by: FAMILY MEDICINE

## 2021-03-29 PROCEDURE — 97530 THERAPEUTIC ACTIVITIES: CPT

## 2021-03-29 RX ORDER — SULFAMETHOXAZOLE AND TRIMETHOPRIM 800; 160 MG/1; MG/1
1 TABLET ORAL EVERY 12 HOURS SCHEDULED
Qty: 12 TABLET | Refills: 0 | DISCHARGE
Start: 2021-03-29 | End: 2021-04-04

## 2021-03-29 RX ORDER — AMOXICILLIN AND CLAVULANATE POTASSIUM 875; 125 MG/1; MG/1
1 TABLET, FILM COATED ORAL EVERY 12 HOURS SCHEDULED
Qty: 12 TABLET | Refills: 0 | DISCHARGE
Start: 2021-03-29 | End: 2021-04-04

## 2021-03-29 RX ORDER — LEVETIRACETAM 1000 MG/1
1000 TABLET ORAL 2 TIMES DAILY
Qty: 60 TABLET | Refills: 0 | DISCHARGE
Start: 2021-03-29

## 2021-03-29 RX ADMIN — SULFAMETHOXAZOLE AND TRIMETHOPRIM 1 TABLET: 800; 160 TABLET ORAL at 09:26

## 2021-03-29 RX ADMIN — AMLODIPINE BESYLATE 5 MG: 5 TABLET ORAL at 09:25

## 2021-03-29 RX ADMIN — SODIUM CHLORIDE, PRESERVATIVE FREE 10 ML: 5 INJECTION INTRAVENOUS at 09:27

## 2021-03-29 RX ADMIN — SENNOSIDES 8.6 MG: 8.6 TABLET, FILM COATED ORAL at 09:26

## 2021-03-29 RX ADMIN — MICONAZOLE NITRATE: 20 POWDER TOPICAL at 09:26

## 2021-03-29 RX ADMIN — RISPERIDONE 0.5 MG: 0.25 TABLET ORAL at 09:26

## 2021-03-29 RX ADMIN — ACETAMINOPHEN 650 MG: 325 TABLET ORAL at 09:31

## 2021-03-29 RX ADMIN — NYSTATIN 500000 UNITS: 100000 SUSPENSION ORAL at 09:27

## 2021-03-29 RX ADMIN — AMOXICILLIN AND CLAVULANATE POTASSIUM 1 TABLET: 875; 125 TABLET, FILM COATED ORAL at 09:25

## 2021-03-29 RX ADMIN — LEVETIRACETAM 1000 MG: 500 TABLET, FILM COATED ORAL at 09:25

## 2021-03-29 RX ADMIN — MAGNESIUM GLUCONATE 500 MG ORAL TABLET 400 MG: 500 TABLET ORAL at 09:26

## 2021-03-29 RX ADMIN — LOSARTAN POTASSIUM 100 MG: 100 TABLET, FILM COATED ORAL at 09:26

## 2021-03-29 ASSESSMENT — PAIN DESCRIPTION - LOCATION: LOCATION: GENERALIZED

## 2021-03-29 ASSESSMENT — PAIN SCALES - GENERAL: PAINLEVEL_OUTOF10: 10

## 2021-03-29 NOTE — CARE COORDINATION
3/29/21, 9:46 AM EDT    Patient goals/plan/ treatment preferences discussed by  and . Patient goals/plan/ treatment preferences reviewed with patient/ family. Patient/ family verbalize understanding of discharge plan and are in agreement with goal/plan/treatment preferences. Understanding was demonstrated using the teach back method. AVS provided by RN at time of discharge, which includes all necessary medical information pertaining to the patients current course of illness, treatment, post-discharge goals of care, and treatment preferences. Services After Discharge  Services At/After Discharge: In ambulance, Nursing Services, OT, PT, Aide Services([facility]/ ambulance)      Ang Grimm will be discharged to [facility] today. She will be skilled at the facility under her [address] [facility] benefit. She will be transported by ambulance. Spoke with Orlando Torres at [facility] transport and they will not provide transport as she needs are stretched. [facility] transport and they are able to provide stretcher transport. Spoke with Marielle Seymour in admissions at the facility and they are ready to accept at discharge. Brother is in the room and is aware of discharge.

## 2021-03-29 NOTE — PROGRESS NOTES
Report called to facility and given to Yaritza. All questions answered. Patients brother is aware of discharge IV removed per policy with tip intact. Tele discontinued. All personal belongings gathered. Facility stated they never received fax of orders. This RN re-faxed AVS. Also received another fax number of [phone]. This RN faxed AVS to this number as well.

## 2021-03-29 NOTE — PLAN OF CARE
Problem: Skin Integrity:  Goal: Will show no infection signs and symptoms  Description: Will show no infection signs and symptoms  Outcome: Ongoing  Goal: Absence of new skin breakdown  Description: Absence of new skin breakdown  Outcome: Ongoing     Problem: Falls - Risk of:  Goal: Will remain free from falls  Description: Will remain free from falls  Outcome: Ongoing  Goal: Absence of physical injury  Description: Absence of physical injury  Outcome: Ongoing     Problem: Confusion - Acute:  Goal: Mental status will be restored to baseline  Description: Mental status will be restored to baseline  Outcome: Ongoing     Problem: Discharge Planning:  Goal: Discharged to appropriate level of care  Description: Discharged to appropriate level of care  Outcome: Ongoing     Problem: Injury - Risk of, Physical Injury:  Goal: Will remain free from falls  Description: Will remain free from falls  Outcome: Ongoing  Goal: Absence of physical injury  Description: Absence of physical injury  Outcome: Ongoing     Problem: Pain:  Goal: Pain level will decrease  Description: Pain level will decrease  Outcome: Ongoing     Problem: Nutrition  Goal: Optimal nutrition therapy  3/29/2021 1436 by Trevin Woody  Outcome: Ongoing  3/29/2021 1033 by Dominique Longoria RD, LD  Outcome: Ongoing     Problem: Musculor/Skeletal Functional Status  Goal: Highest potential functional level  Outcome: Ongoing

## 2021-03-29 NOTE — PROGRESS NOTES
Comprehensive Nutrition Assessment    Type and Reason for Visit:  Reassess    Nutrition Recommendations/Plan:   Continue current ONS. Diet as per SLP. Recommend MVI. Question if pt. Would benefit from appetite stimulant. Nutrition Assessment:    Pt improving from a nutritional standpoint AEB consuming % of ONS, however poor po intake at meals. Remains at risk for further nutritional compromise r/t increased nutrient needs for wound healing, AMS and underlying medical condition (hx brain aneurysm s/p  shunt, seizures, HTN). Nutrition recommendations/interventions as per above. Malnutrition Assessment:  Malnutrition Status: At risk for malnutrition (Comment)    Context:  Acute Illness     Findings of the 6 clinical characteristics of malnutrition:  Energy Intake:  7 - 50% or less of estimated energy requirements for 5 or more days  Weight Loss:  No significant weight loss(9.2% weight gain in the last 7 days, edema present)     Body Fat Loss:  No significant body fat loss     Muscle Mass Loss:  No significant muscle mass loss    Fluid Accumulation:  1 - Mild Extremities   Strength:  Not Performed    Estimated Daily Nutrient Needs:  Energy (kcal):  5306-3546 kcals (11-15); Weight Used for Energy Requirements:  (127 kgm 3/21)     Protein (g):  118+ grams (2+/kgm); Weight Used for Protein Requirements:  Ideal(59 kgm)          Nutrition Related Findings:  s/p  shunt revision 3/22/21. Pt. Seen - confused in coversation; RN present - states pt. Drank all of ONS however not eating much at meal; needs to be fed and provided with much encouragement to eat; 1 BM noted past 24 hours; 3/29: Glucose 102, BUN 8, Cr 0.7; Rx includes ATB, Nystatin, Keppra, Glycolax, Zofran    Wounds:  (stage II buttocks, s/p 3/22/21  shunt revision, rith head/neck incisions, lower abd incisions)       Current Nutrition Therapies:    DIET DYSPHAGIA PUREED;  Low Microbial, Lactose Controlled  Dietary Nutrition Supplements: Standard High Calorie Oral Supplement    Anthropometric Measures:  · Height: 5' 6\" (167.6 cm)  · Current Body Weight: 295 lb (133.8 kg)(3/29 +1 edema)   · Admission Body Weight: 274 lb 8 oz (124.5 kg)(3/18 +1 edema)    · Usual Body Weight: (per EMR: 5/5/20: 331# 9.6 oz)     · Ideal Body Weight: 130 lbs;      · BMI: 47.6  · BMI Categories: Obese Class 3 (BMI 40.0 or greater)       Nutrition Diagnosis:   · Inadequate oral intake related to cognitive or neurological impairment(poor appetite) as evidenced by (poor po intake (NPO x 5 days during admission))      Nutrition Interventions:   Food and/or Nutrient Delivery:  Continue Current Diet, Continue Oral Nutrition Supplement  Nutrition Education/Counseling:  Education initiated(Encouraged oral intake and ONS use.)   Coordination of Nutrition Care:  Continue to monitor while inpatient    Goals:  Patient will safely consume 75% or more of meals during LOS. Nutrition Monitoring and Evaluation:   Behavioral-Environmental Outcomes:  None Identified   Food/Nutrient Intake Outcomes:  Diet Advancement/Tolerance, Food and Nutrient Intake, Supplement Intake  Physical Signs/Symptoms Outcomes:  Biochemical Data, Chewing or Swallowing, GI Status, Fluid Status or Edema, Nutrition Focused Physical Findings, Skin, Weight     Discharge Planning:     Too soon to determine     Electronically signed by Joss Crawford RD, LD on 3/29/21 at 10:34 AM EDT    Contact: [phone]

## 2021-03-29 NOTE — PLAN OF CARE
Juana Dunlap RN  Outcome: Ongoing  Note: Patient came from [facility] and rehab. Problem: Injury - Risk of, Physical Injury:  Goal: Will remain free from falls  Description: Will remain free from falls  3/29/2021 0034 by Juana Dunlap RN  Outcome: Ongoing  Note: Patient remains free from falls this shift. Fall precautions in place with bed/chair exit alarmed. Fall sign posted and fall armband in place. Nonskid footwear used with transferring. Educated patient to use call light when in need of staff assistance with transferring, ambulating, and other activities of daily living. Patient appropriately uses call light this shift. Problem: Injury - Risk of, Physical Injury:  Goal: Absence of physical injury  Description: Absence of physical injury  3/29/2021 0034 by Juana Dunlap RN  Outcome: Ongoing  Note: Hourly rounding in place to anticipate patient needs, such as assistance with pain, toileting, or repositioning, in order to decrease risk for injuries such as falls. Problem: Pain:  Goal: Pain level will decrease  Description: Pain level will decrease  3/29/2021 0034 by Juana Dunlap RN  Outcome: Ongoing  Note: Patient able to use 0-10 pain scale. Denies pain at this time. Patient has a pain goal of \"no pain. \" Agreeable to take PRN pain medications. Problem: Nutrition  Goal: Optimal nutrition therapy  3/29/2021 0034 by Juana Dunlap RN  Outcome: Ongoing     Problem: Musculor/Skeletal Functional Status  Goal: Highest potential functional level  3/29/2021 0034 by Juana Dunlap RN  Outcome: Ongoing     Problem: Physical Regulation:  Goal: Ability to maintain a body temperature in the normal range will improve  Description: Ability to maintain a body temperature in the normal range will improve  3/29/2021 0034 by Juana Dunlap RN  Outcome: Completed   Care plan reviewed with patient.

## 2021-03-29 NOTE — PROGRESS NOTES
CLINICAL PHARMACY: DISCHARGE MED RECONCILIATION/REVIEW    [facility]) Select Patient?: No  Total # of Interventions Recommended: 1 -   - Updated Order #: 1   -   Total # Interventions Accepted: 0  Intervention Severity:   - Level 1 Intervention Present?: No   - Level 2 #: 0   - Level 3 #: 1   Time Spent (min): 15    Additional Documentation:

## 2021-03-29 NOTE — PROGRESS NOTES
[facility]  INPATIENT PHYSICAL THERAPY  DAILY NOTE  [facility] 4A - 4A-16/016-A  Time In: 2780  Time Out: 3773  Timed Code Treatment Minutes: 40 Minutes  Minutes: 40          Date: 3/29/2021  Patient Name: Jenny Singh,  Gender:  female        MRN: [id number]  : 1960  (61 y.o.)     Referring Practitioner: KRIS Sandoval  Diagnosis: neutropenic fever  Additional Pertinent Hx: per H&P:60 y.o. 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 History is obtained from chart review and speaking with the ED provider as family left earlier in the evening. Patient does not respond to questions and mumbles to herself during exam.Per family, patient has had worsening neurological decline since December, now unable to walk. Last , she began having AMS without any obvious signs or symptoms of infection. She was seen by her neurologist, who is located near their [place] in [place], who had some concern about her shunt malfunctioning. Patient does open her eyes to sound, but does not respond in any other way. CBC was significant for neutropenia and repeated. WBCs were 0.4 and 0.3 on repeat. Associated with fever. Evidence of UTI. No other evidence of infection on imaging or skin exam.  Given vanc/zosyn in ED. Imaging on arrival at [address] [place] [address] showed moderate ventriculomegaly, slightly progressed compared to imaging from 3/11/2020. Some subcortical white matter hypo-attenuation demonstrated at the level of he left frontal lobe, which could represent an age-indeterminate infarct.   Some fluid noted in pelvis near shunt, could benefit from paracentesis and culture     Prior Level of Function:  Type of Home: Facility  Home Layout: One level        ADL Assistance: Needs assistance  Homemaking Responsibilities: No  Ambulation Assistance: Needs assistance  Transfer Assistance: Needs assistance  Additional Comments: per brother pt hasn't been ambulatory and requires assist for mobility at [facility]. Called facility this date, nurse reports that patient required a hans lift to get into and out of W/C. Pt unable to complete W/C mobility, requiring max assistance. Restrictions/Precautions:  Restrictions/Precautions: General Precautions, Fall Risk  Position Activity Restriction  Other position/activity restrictions: pt is WBAT per nursing at [facility]. SUBJECTIVE: RN approves session. STNA reports that patient is being D/C'ed today. Pt in bed upon entry, requiring max encouragement for participation this date. Pt reports that she gets dizzy with any movement and demonstrates bouts of confusion throughout session. Pt is able to report that she is in [facility], but is disoriented to time and situation. Pt will initiate conversation but will not finish sentences. Pt unable to give prior level function while she was at [facility], or state how long she has been there. Post session, pt returned to bed, call light within reach, all needs met. PAIN: 8/10: LBP, reports that is not new to this admission    Vitals: Vitals not assessed per clinical judgement, see nursing flowsheet    OBJECTIVE:  Bed Mobility:  Rolling to Left: Moderate Assistance x 1, Maximum Assistance x 1, completed 2 trials. Pt required verbal and manual assistance to reach handrail on the bed and complete technique. Rolling to Right: Moderate Assistance, Maximum Assistance completed 2 trials. Pt required verbal and manual assistance to reach handrail on the bed and complete technique.    Supine to Sit: Minimal Assistance x 1 for trunk- pt able to assist by pushing up on elbow, Maximum Assistance x 1 for LE movement  Sit to Supine: Minimal Assistance x 1 for trunk- pt able to go down on elbow, Maximum Assistance  X 1 for LE movement  Scooting: Maximum Assistance, X 2    Transfers:  Not Tested secondary to pt confusion and increased assistance required to sit EOB this date. Ambulation:  Not tested secondary to increased pt confusion and assistance required to sit EOB this date. Balance:  Static Sitting Balance:  Minimal Assistance  *Pt sat EOB 15 minutes this date, requiring min A at all times for upright posture. Max cues for holding up head and keeping eyes open. Pt does report dizziness and nausea at EOB, no emesis this date. Pt educated on the importance of upright posture to aid in acclimation to upright and decreased dizziness. Exercise:  Patient was guided in 1 set(s) 10 reps of exercise to both lower extremities. Ankle pumps, Heelslides and Hip abduction/adduction. Exercises were completed for increased independence with functional mobility. *Pt  max encouragement, verbal cues, and assistance to complete exercises this date. Pt required moderate-max assistance with completion of exercises. Functional Outcome Measures: Completed  AM-PAC Inpatient Mobility Raw Score : 8  AM-PAC Inpatient T-Scale Score : 28.52    ASSESSMENT:  Assessment: Pt demonstrates decreased independence and increased confusion noted this date. Pt requires max assistance x 1 person and min-mod assistance x another person to complete bed mobility. Pt demonstrates increased confusion and requires min assistance to remain upright at EOB. Pt also requires max verbal cues to keep eyes open and hold held up at EOB. Pt demonstrates max fatigue at end of session, unable to keep eyes open. Activity Tolerance:  Patient tolerance of  treatment: fair.       Equipment Recommendations:Equipment Needed: No  Discharge Recommendations:  ECF with PT    Plan: Times per week: 3x GM  Times per day: Daily  Specific instructions for Next Treatment: therex, bed mobility, sitting balance    Patient Education  Patient Education: Bed Mobility, Verbal Exercise Instruction    Goals:  Patient goals : not stated  Short term goals  Time Frame for Short term goals: by discharge  Short term goal 1: Roll R/L with modA for pressure reilef  Short term goal 2: sidelying to/from sit with modA to get in/out of bed  Short term goal 3: tolerate >15 min dyn sitting balance activity with SBA to demonstrate inc strength for progression with transfers  Short term goal 4: PT to assess transfers/gait  Long term goals  Time Frame for Long term goals : no LTGs set secondary to short ELOS    Following session, patient left in safe position with all fall risk precautions in place.

## 2021-03-29 NOTE — PLAN OF CARE
Problem: Nutrition  Goal: Optimal nutrition therapy  3/29/2021 1033 by Eliud Spears RD, LD  Outcome: Ongoing  Nutrition Problem #1: Inadequate oral intake  Intervention: Food and/or Nutrient Delivery: Continue Current Diet, Continue Oral Nutrition Supplement  Nutritional Goals: Patient will safely consume 75% or more of meals during LOS.

## 2021-03-29 NOTE — PROGRESS NOTES
Patient is saying her pain is 10/10. Patient educated on what 10/10 pain is. She still reports it as 10/10. She is unable to tell me where or what hurts. She just says \"All over\" Patient appears sleepy. She has eyes closed as she is talking with RN. Patient is not moaning or crying. No facial grimacing noted. Will give PRN Tylenol.

## 2021-03-29 NOTE — DISCHARGE SUMMARY
Hospital Medicine Discharge Summary      Patient Identification:   Annette Sultana   : 1960  MRN: [id number]   Account: [de-identified]      Patient's PCP: Elle Knox MD    Admit Date: 3/17/2021     Discharge Date:   3/29/2021     Admitting Physician: Kash Del Real MD     Discharge Physician: Rachel Gray MD     Discharge Diagnoses with Hospital course:      Annette Sultana is a 61 y.o. female admitted to [address] [place] [address] on 3/17/2021 for  shunt malfunction. Per previous note:    \"60 y.o. female with a past medical history spontaneous intraparenchymal intracranial hemorrhage due to aneurysm (s/p  shunt ), who presented to [address] [place] [address] with altered mental status, worsening over the last week. Chen Said is obtained from chart review and speaking with the ED provider as family left earlier in the evening.  Patient does not respond to questions and mumbles to herself during exam.     Per family, patient has had worsening neurological decline since December, now unable to walk. Wally Harrison , she began having AMS without any obvious signs or symptoms of infection.  She was seen by her neurologist, who is located near their hometown in [place], who had some concern about her shunt malfunctioning. Rickie Terrazas does open her eyes to sound, but does not respond in any other way.     CBC was significant for neutropenia and repeated.  WBCs were 0.4 and 0.3 on repeat.  Associated with fever.  Evidence of UTI.  No other evidence of infection on imaging or skin exam. Darío ring/anahy in ED.     Imaging on arrival at [facility] showed moderate ventriculomegaly, slightly progressed compared to imaging from 3/11/2020.  Some subcortical white matter hypo-attenuation demonstrated at the level of he left frontal lobe, which could represent an age-indeterminate infarct.  Some fluid noted in pelvis near shunt, could benefit from paracentesis and culture. \"     3/19: No acute events overnight. Per SW who spoke with NH, nursing home was speaking to family about hospice. Pt is following commands. Pt does not give good conversation. Pt not complaining of pain.      3/20: No acute events overnight. Pt is alert but lethargic. Shuntogram showed possible occulusion.      3/21: No acute events overnight. Pt is more alert and talkative today. Pt understands that she will undergo surgery tomorrow. No issues or complaints.      3/22: No acute events overnight. Pt underwent surgery this AM for  shunt replacement, pt tolerated surgery well. No issues or complaints. Pt more awake. \"      Please see below for details of hospital course:      1.  Shunt Malfucntion: s/p revision  shunt placement on 3/22/2021. Neurosurgery & General Surgery together on surgery. Appreciate specialists input. Dressing c/d/i. Neuro checks per protocol. On 3/26, NS ( Dr. Mary Sweeney) and GS (Dr. Genesis Aj) are okay for discharge. NS recommend f/u in his office 2 weeks after procedure with repeat CT head prior appt. GS recommend f/u in his office in 7-10 days. Staples ( head and abdomen) removal on follow-up with neurosurgery   2. Acute encephalopathy: Likely due to  shunt malfunction, improving. Recommend neuro checks in ECF. Pt is waxing and waning with mentation. Patient was cleared by neurosurgery for discharge   3. Sepsis, unknown etiology, resolving: Urine culture contaminant & Blood cultures NG. CSF showed no growth. IVF. Managed with IV antibiotics while in the hospital. On 3/22 night, patient spiked a fever of 102. Tylenol given. Leukopenia improving. lactate normal. Continue IV vanco  and Zosyn ( start date 3/17/2021 for both abx). ID on-board. I spoke with Dr. Arelis Nance (ID) on-board, who recommend switch IV abx to Augmentin 875 mg PO BID and Bactrim DS BID for 1 week. IV antibiotics were switched to Augmentin and Bactrim on 3/28/2021. Dr. Arelis Nanec is okay for discharge today. Appreciate ID input.   4. History of Continue statin. 18. Possible depression: psych on-board, who recommend cont risperdal for now. Appreciate psych input       On 3/29/2021, patient is medically stable for discharge. Patient was cleared for discharge from all the specialists (neurosurgery, ID, surgery and hematologist)      The patient was seen and examined on day of discharge and this discharge summary is in conjunction with any daily progress note from day of discharge. Exam:     Vitals:  Vitals:    03/28/21 1548 03/28/21 2054 03/28/21 2311 03/29/21 0352   BP: 128/62 (!) 145/69 (!) 145/73 (!) 130/58   Pulse: 74 75 90 84   Resp: 18 18 18 16   Temp: 98.4 °F (36.9 °C) 97.8 °F (36.6 °C)  98.2 °F (36.8 °C)   TempSrc: Oral Oral  Oral   SpO2: 93% 93% 93% 90%   Weight:    295 lb (133.8 kg)   Height:         Weight: Weight: 295 lb (133.8 kg)     24 hour intake/output:    Intake/Output Summary (Last 24 hours) at 3/29/2021 0756  Last data filed at 3/29/2021 4712  Gross per 24 hour   Intake 2192.1 ml   Output 2475 ml   Net -282.9 ml       General appearance: Chronically ill appearing white female  HEENT: Pupils equal, round, and reactive to light. Conjunctivae clear. (+) staples on right temple, looks clean, no incision nor drainage. Neck: Supple, with full range of motion. No jugular venous distention. Trachea midline. Respiratory:  Normal respiratory effort on RA. Diminished air entry on lung bases, no rales, no rhonchi, no wheezing   Cardiovascular: normal rate, regular rhythm with normal S1/S2 without murmurs, rubs or gallops. Abdomen: (+) staples on right mid abdomen, looks clean, no erythema, no drainage, non-distended with normal bowel sounds, (+) mid abdomen tenderness near the staples area  Musculoskeletal: passive and active ROM x 4 extremities. Skin: No rashes or lesions.   Neurological exam reveals alert, oriented x 3 ( knows name and bday, she knows that she's at [facility])), normal speech, no focal findings or movement disorder noted, cranial nerves II through XII intact, motor and sensory grossly normal bilaterally. Exam of extremities: peripheral pulses normal, (+) grade 1 pitting bipedal edema, (+) grade 1 pitting edema on both legs ( from below knees to distal legs), no clubbing or cyanosis. SCD in placed on both legs            Labs: For convenience and continuity at follow-up the following most recent labs are provided:      CBC:    Lab Results   Component Value Date    WBC 5.8 03/29/2021    HGB 9.8 03/29/2021    HCT 31.0 03/29/2021    PLT 54 03/29/2021       Renal:    Lab Results   Component Value Date     03/29/2021    K 3.8 03/29/2021    K 4.3 03/23/2021     03/29/2021    CO2 29 03/29/2021    BUN 8 03/29/2021    CREATININE 0.7 03/29/2021    CALCIUM 9.4 03/29/2021         Significant Diagnostic Studies    Radiology:   XR ABDOMEN (KUB) (SINGLE AP VIEW)   Final Result   No acute findings. **This report has been created using voice recognition software. It may contain minor errors which are inherent in voice recognition technology. **         Final report electronically signed by Dr. Coral De Anda on 3/26/2021 2:58 PM      CT HEAD WO CONTRAST   Final Result    Interval right frontal approach shunt revision with catheter tip crossing the midline and tip in the left thalamus with postsurgical pneumocephalus in the right lateral ventricle with mild interval decrease in size of the ventricles compared to prior    CT. **This report has been created using voice recognition software. It may contain minor errors which are inherent in voice recognition technology. **      Final report electronically signed by Dr. Lawanda Adair MD on 3/23/2021 10:45 AM      RADIOLOGY REPORT   Final Result      NM CSF SHUNT EVALUATION   Final Result      Absence of activity in distal shunt catheter tubing and in the pelvis, highly suspicious for occlusion.       Final report electronically signed by Dr. Lesly Davis on 3/19/2021 4:01 PM      CT CHEST WO CONTRAST   Final Result   1. No acute intrathoracic findings. 2.  Minimal consolidation along the right lung base posteriorly, likely    representing atelectasis. This document has been electronically signed by: Robinson Amos MD on    03/17/2021 11:10 PM      All CTs at this facility use dose modulation techniques and iterative    reconstructions, and/or weight-based dosing   when appropriate to reduce radiation to a low as reasonably achievable. CT ABDOMEN PELVIS WO CONTRAST Additional Contrast? None   Final Result   1. No acute intra-abdominal or pelvic findings. No bowel obstruction. No renal obstruction. 2.  Small amount of fluid present within the lower abdomen/pelvis. This    is likely secondary to the ventriculoperitoneal shunt which is in close    proximity to this region. This document has been electronically signed by: Robinson Amos MD on    03/17/2021 11:03 PM      All CTs at this facility use dose modulation techniques and iterative    reconstructions, and/or weight-based dosing   when appropriate to reduce radiation to a low as reasonably achievable. XR SHUNT SERIES PLACEMENT (<4 VIEWS)   Final Result   1. There is a right-sided  shunt catheter. The visualized portions of the catheter. Intact. However, there is partial exclusion of the inferior aspect of the catheter overlying the lower pelvis. 2. There are mildly dilated gas-filled small bowel loops overlying the mid abdomen. Differential considerations would include a possible focal ileus or partial small bowel obstruction. Consider further characterization with CT. **This report has been created using voice recognition software. It may contain minor errors which are inherent in voice recognition technology. **      Final report electronically signed by Dr. Samuel Olivera on 3/17/2021 8:39 PM      CT HEAD WO CONTRAST   Final Result   1.  No acute intracranial hemorrhage or mass effect. 2. There is subcortical white matter hypoattenuation demonstrated at the level of the left frontal lobe as seen on axial images 20/7/2029. This may represent an age-indeterminate infarct. Although a remote infarct is favored given the appearance of    encephalomalacia on coronal series image 22.      3. There is moderate ventriculomegaly demonstrated. The overall degree of ventriculomegaly appears slightly progressed at the level of the anterior left lateral ventricle when compared to the prior examination dated 3/11/2020.      4. There is an anterior rightward approach ventricular peritoneal shunt catheter with the distal tip abutting the septum pellucidum on axial image 20. This is similar in position when compared to prior examination. This slightly deviates the septum    pellucidum towards the left by approximately 4 mm. However, there is no evidence of midline shift at the level of the pineal gland. **This report has been created using voice recognition software. It may contain minor errors which are inherent in voice recognition technology. **      Final report electronically signed by Dr. Gregor Da Silva on 3/17/2021 8:34 PM      CT HEAD WO CONTRAST    (Results Pending)          Consults:     IP CONSULT TO SOCIAL WORK  IP CONSULT TO INFECTIOUS DISEASES  IP CONSULT TO ONCOLOGY  IP CONSULT TO NEUROSURGERY  IP CONSULT TO GENERAL SURGERY  PHARMACY TO DOSE VANCOMYCIN  IP CONSULT TO DIETITIAN  IP CONSULT TO PSYCHIATRY    Disposition:    [] Home       [] TCU       [] Rehab       [] Psych       [x] SNF       [] [place]       [] Other-    Condition at Discharge: Stable    Code Status:  Full Code     Patient Instructions:    Discharge lab work: CBC in 1 week   Activity: activity as tolerated  Diet: DIET DYSPHAGIA PUREED;  Low Microbial, Lactose Controlled  Dietary Nutrition Supplements: Standard High Calorie Oral Supplement      Follow-up visits:   CM STR [facility]  [address] [facility] [address] [place] [address] [address]        [address]elli Fuentes[address]  [facility]    In 2 weeks  Hospital follow up and suture removal     Jeronimo Nunez MD  [address]  [facility] 83  [phone]    In 1 week      Jaylen Cavazos MD  [address] [facility] [address] [place] [address]  [phone]    In 1 week  with repeat CBC prior hematology appointment          Discharge Medications:      Cherri Estimable   Home Medication Instructions [id number]    Printed on:03/29/21 0756   Medication Information                      amLODIPine (NORVASC) 5 MG tablet  Take 5 mg by mouth daily             amoxicillin-clavulanate (AUGMENTIN) 875-125 MG per tablet  Take 1 tablet by mouth every 12 hours for 12 doses Starting 3/29/2021 at 9pm             atorvastatin (LIPITOR) 20 MG tablet  Take 20 mg by mouth nightly             levETIRAcetam (KEPPRA) 1000 MG tablet  Take 1 tablet by mouth 2 times daily             losartan (COZAAR) 100 MG tablet  Take 100 mg by mouth daily             magnesium oxide (MAG-OX) 400 (241.3 Mg) MG TABS tablet  Take 1 tablet by mouth 2 times daily             nystatin (MYCOSTATIN) 741933 UNIT/ML suspension  Take 5 mLs by mouth 4 times daily             risperiDONE (RISPERDAL) 0.5 MG tablet  Take 0.5 mg by mouth 2 times daily             senna (SENOKOT) 8.6 MG tablet  Take 1 tablet by mouth 2 times daily             Simethicone 80 MG TABS  Take 80 mg by mouth every 8 hours as needed             sulfamethoxazole-trimethoprim (BACTRIM DS;SEPTRA DS) 800-160 MG per tablet  Take 1 tablet by mouth every 12 hours for 12 doses Starting 3/29/2021 at 9pm                 Time Spent on discharge is more than 30 minutes in the examination, evaluation, counseling and review of medications and discharge plan. Signed: Thank you Doris Osman MD for the opportunity to be involved in this patient's care.     Electronically signed by Carolin Jones Scott Bedoya MD on 3/29/2021 at 7:56 AM

## 2021-04-09 ENCOUNTER — OFFICE VISIT (OUTPATIENT)
Dept: NEUROSURGERY | Age: 61
End: 2021-04-09

## 2021-04-09 VITALS
SYSTOLIC BLOOD PRESSURE: 108 MMHG | BODY MASS INDEX: 47.09 KG/M2 | TEMPERATURE: 96.9 F | HEART RATE: 66 BPM | HEIGHT: 66 IN | DIASTOLIC BLOOD PRESSURE: 63 MMHG | WEIGHT: 293 LBS

## 2021-04-09 DIAGNOSIS — Z98.2 S/P VP SHUNT: Primary | ICD-10-CM

## 2021-04-09 DIAGNOSIS — I67.1 CEREBRAL ANEURYSM: ICD-10-CM

## 2021-04-09 PROCEDURE — 99024 POSTOP FOLLOW-UP VISIT: CPT | Performed by: PHYSICIAN ASSISTANT

## 2021-04-09 RX ORDER — BISACODYL 10 MG
10 SUPPOSITORY, RECTAL RECTAL DAILY PRN
COMMUNITY
Start: 2020-12-27

## 2021-04-09 NOTE — PROGRESS NOTES
[address] [place] [address]  [address]  Dept: [phone]  Dept Fax: [phone]  Loc: [facility] Follow Visit  Visit Date: 4/9/2021      Lorelei Vasquez  is a 64 y.o. female who is returning to the office today for a post-op follow-up visit. She had surgery on 3/22/2021 with Dr. Zenobia Berry for reexploration revision of ventriculoperitoneal shunt performed Dr. Jaswant Rahman and assisted by Dr. Jose D Gonzalez, without complication. Patient was last seen and evaluated in the hospital setting on 3/23/2021 where she was comfortable with flat dry incisions and dressings intact and remained stable and intact her baseline with some noted chronic confusion. She arrives today for her initial post discharge hospital follow-up accompanied by her caregiver and together they relate 90% improvement since the shunt revision. Her incision is healing nicely and surgical staples will be removed at today's visit with a follow-up appointment arranged in approximately 4 weeks to ascertain continued improvement in healing of her incision site. The shunt setting is checked today and verified at 10. Latest CT scan of the head performed without contrast on 3/23/2021 revealed interval decrease in the size of the ventricles compared to the prior CT with stable placement of the shunt. Based on stable findings on CT scan and improvements from revision we have agreed to follow-up with her in 4 weeks with a new CT scan of the head to be imaged for comparison and review. They are encouraged to reach out to our office with any additional questions or concerns or should she experience any significant changes. They remain very happy with the procedure to date with questions and concerns addressed and answered.     · Patient was evaluated today and is doing very well overall. · No new complaints were voiced.   · Patient  lives in an assisted living facility  · Wound: wound healing as expected  · Follow-up Studies: No orders of the defined types were placed in this encounter. ·      Assessment/Plan:  · Status Post  shunt revision performed by Dr. Alley Mejia and assisted by Dr. Pascale Gutierrez, without complication. · Doing very well overall  · Encouraged gradual increase in physical and mental activity. · Fall precaution and home safety education provided to patient. · Follow-up: 4-week follow-up with new CT scan of the head to be imaged with and without contrast for comparison and review.       Electronically signed by Sugar Sim PA-C on 4/9/21 at 12:36 PM EDT

## 2021-04-29 ENCOUNTER — TELEPHONE (OUTPATIENT)
Dept: NEUROSURGERY | Age: 61
End: 2021-04-29

## 2021-07-07 ENCOUNTER — OFFICE VISIT (OUTPATIENT)
Dept: NEUROSURGERY | Age: 61
End: 2021-07-07
Payer: MEDICARE

## 2021-07-07 VITALS
HEIGHT: 66 IN | BODY MASS INDEX: 47.09 KG/M2 | HEART RATE: 54 BPM | SYSTOLIC BLOOD PRESSURE: 121 MMHG | DIASTOLIC BLOOD PRESSURE: 84 MMHG | WEIGHT: 293 LBS | TEMPERATURE: 97.7 F

## 2021-07-07 DIAGNOSIS — Z98.2 S/P VP SHUNT: Primary | ICD-10-CM

## 2021-07-07 PROCEDURE — G8417 CALC BMI ABV UP PARAM F/U: HCPCS | Performed by: PHYSICIAN ASSISTANT

## 2021-07-07 PROCEDURE — 3017F COLORECTAL CA SCREEN DOC REV: CPT | Performed by: PHYSICIAN ASSISTANT

## 2021-07-07 PROCEDURE — G8427 DOCREV CUR MEDS BY ELIG CLIN: HCPCS | Performed by: PHYSICIAN ASSISTANT

## 2021-07-07 PROCEDURE — 99214 OFFICE O/P EST MOD 30 MIN: CPT | Performed by: PHYSICIAN ASSISTANT

## 2021-07-07 PROCEDURE — 1036F TOBACCO NON-USER: CPT | Performed by: PHYSICIAN ASSISTANT

## 2021-07-07 RX ORDER — CIPROFLOXACIN 500 MG/1
250 TABLET, FILM COATED ORAL 2 TIMES DAILY
Status: ON HOLD | COMMUNITY
Start: 2021-01-26 | End: 2022-06-07

## 2021-07-07 NOTE — PROGRESS NOTES
[address] [place] [address]  Dept: [phone]  Dept Fax: [phone]  Loc: [facility] Follow Visit  Visit Date: 7/7/2021      Dalia Walker  is a 64 y.o. female who is returning to the office today for a post-op follow-up visit. She had surgery on 3/22/2021 with Dr. Alex Patrick for reexploration revision of ventriculoperitoneal shunt performed Dr. Jaskaran Ferguson and assisted by Dr. Sandra Odonnell, without complication. She was last seen and evaluated in our office setting on 4/9/2021 for her initial post discharge hospital follow-up accompanied by a caregiver where it was related that she was receiving 90% improvement following a revision of her shunt. Her incision was nicely healed with surgical staples removed and a shunt setting checked and verified at 10. Days follow-up appointment includes a new CT scan of the head to be imaged for comparison and review and to ascertain continued healing of the incision site. Of note: She was unable to obtain the CT scan due to an authorization issue and the order for the scan will be repeated. At today's visit arrives in a wheelchair and remained in the wheelchair throughout the exam process. She is doing quite well and is noticed significant improvement of her prior symptom presentation following the revision of her shunt. The incision is well-healed and the shunt setting was checked and verified again today at 10. We will renew efforts to obtain a CT scan for comparison and to rule out thickened changes in the ventricular system that would warrant an adjustment to the shunt setting. The image will also serve to rule out any bleeding or development of a hematoma which is a concern following revision surgery.   A follow-up appointment to review these image findings can be held virtually in approximately 6 weeks with encouragement for the patient and patient's family to reach out to our office with any additional questions or concerns or should she experience any significant changes in her general health. They are very happy with the procedure with her recovery to date and improvement and with today's visit having a question concerns addressed and answered.     · Patient was evaluated today and is doing well overall. · No new complaints were voiced. · Patient  lives with their family  · Wound: wound healing as expected  · Follow-up Studies: No orders of the defined types were placed in this encounter. ·      Assessment/Plan:  · Status Post  shunt revision for normal pressure hydrocephalus. · Doing well overall  · Encouraged gradual increase in physical and mental activity. · Fall precaution and home safety education provided to patient. · Follow-up: 6-week follow-up with CT scan of the head to be imaged for comparison and review following recent implantation of ventriculoperitoneal shunt to address normal pressure hydrocephalus.       Electronically signed by Roberto Diggs PA-C on 7/7/21 at 2:55 PM EDT

## 2021-10-28 DIAGNOSIS — Z98.2 S/P VP SHUNT: ICD-10-CM

## 2021-11-05 ENCOUNTER — HOSPITAL ENCOUNTER (OUTPATIENT)
Dept: CT IMAGING | Age: 61
Discharge: HOME | End: 2021-11-05

## 2021-11-05 ENCOUNTER — TELEMEDICINE (OUTPATIENT)
Dept: NEUROSURGERY | Age: 61
End: 2021-11-05
Payer: MEDICARE

## 2021-11-05 DIAGNOSIS — Z98.2 S/P VP SHUNT: Primary | ICD-10-CM

## 2021-11-05 DIAGNOSIS — R52 PAIN: ICD-10-CM

## 2021-11-05 PROCEDURE — 1036F TOBACCO NON-USER: CPT | Performed by: PHYSICIAN ASSISTANT

## 2021-11-05 PROCEDURE — 99213 OFFICE O/P EST LOW 20 MIN: CPT | Performed by: PHYSICIAN ASSISTANT

## 2021-11-05 PROCEDURE — 3017F COLORECTAL CA SCREEN DOC REV: CPT | Performed by: PHYSICIAN ASSISTANT

## 2021-11-05 PROCEDURE — G8417 CALC BMI ABV UP PARAM F/U: HCPCS | Performed by: PHYSICIAN ASSISTANT

## 2021-11-05 PROCEDURE — G8484 FLU IMMUNIZE NO ADMIN: HCPCS | Performed by: PHYSICIAN ASSISTANT

## 2021-11-05 PROCEDURE — G8427 DOCREV CUR MEDS BY ELIG CLIN: HCPCS | Performed by: PHYSICIAN ASSISTANT

## 2021-11-05 NOTE — PROGRESS NOTES
[address] [place] [address]  Dept: [phone]  Dept Fax: [phone]  Loc: [facility] Follow Visit  Visit Date: 11/5/2021      Hilda Ugarte  is a 64 y.o. female who is presenting virtually today for a post-op follow-up visit. She had surgery on 3/22/2021 with Dr. Terrence Fuentes for reexploration revision of ventriculoperitoneal shunt performed Dr. Scott Velazquez and assisted by Dr. Richard Woods, without complication. She was last seen and evaluated in our office setting on 7/7/2021 with a well-healed incision and shunt setting checked and verified at 10. At that visit she was in a wheelchair and remained in the wheelchair throughout the exam process and was doing quite well with significant improvement noted for her presentation following the revision of the shunt. Today's virtual appointment is to review the latest CT scan findings after shunt adjustment setting to rule out any intracranial abnormalities or bleed. CT scan is reviewed and is stable with stable ventricular size and the patient relates no significant changes since her prior evaluation with continued improvement following the revision of her shunt. Based on the stable intact nature of today's visit and stable findings on imaging we have agreed to repeat the imaging in 6 months with a follow-up appointment virtually to review those image findings. They are encouraged to reach out to our office with any additional question or concerns or should she experience any significant decline in her general presentation.     · Patient was evaluated today and is doing well overall. · No new complaints were voiced. · Patient  lives with their family  · Wound: wound healing as expected  · Follow-up Studies: No orders of the defined types were placed in this encounter.   ·      Assessment/Plan:  · Status Post  shunt revision follow-up  · Doing well overall  · Encouraged gradual increase in physical and mental activity. · Fall precaution and home safety education provided to patient. · Follow-up: 6-month follow-up with new CT scan of the head to be imaged for comparison and review with encouragement for him to reach out to our office with any additional questions or concerns or should she experience any significant decline in her general presentation. They remain very happy with their care to date and especially with today's appointment having questions and concerns addressed and answered.       Electronically signed by Darrell Santoyo PA-C on 11/5/21 at 12:24 PM EDT

## 2022-03-18 ENCOUNTER — OFFICE VISIT (OUTPATIENT)
Dept: NEUROSURGERY | Age: 62
End: 2022-03-18
Payer: MEDICARE

## 2022-03-18 ENCOUNTER — HOSPITAL ENCOUNTER (OUTPATIENT)
Dept: CT IMAGING | Age: 62
Discharge: HOME | End: 2022-03-18

## 2022-03-18 VITALS
WEIGHT: 293 LBS | DIASTOLIC BLOOD PRESSURE: 80 MMHG | HEART RATE: 57 BPM | HEIGHT: 66 IN | SYSTOLIC BLOOD PRESSURE: 138 MMHG | BODY MASS INDEX: 47.09 KG/M2

## 2022-03-18 DIAGNOSIS — R56.9 SEIZURE (HCC): ICD-10-CM

## 2022-03-18 DIAGNOSIS — Z98.2 S/P VP SHUNT: Primary | ICD-10-CM

## 2022-03-18 DIAGNOSIS — G25.0 ESSENTIAL TREMOR: ICD-10-CM

## 2022-03-18 DIAGNOSIS — Z00.6 EXAMINATION FOR NORMAL COMPARISON FOR CLINICAL RESEARCH: ICD-10-CM

## 2022-03-18 PROCEDURE — 99213 OFFICE O/P EST LOW 20 MIN: CPT

## 2022-03-18 RX ORDER — LORATADINE 10 MG/1
1 TABLET ORAL DAILY
COMMUNITY
Start: 2021-08-03

## 2022-03-18 RX ORDER — ONDANSETRON 4 MG/1
4 TABLET, ORALLY DISINTEGRATING ORAL EVERY 6 HOURS PRN
COMMUNITY
Start: 2022-03-04

## 2022-03-18 RX ORDER — KETOCONAZOLE 20 MG/G
CREAM TOPICAL WEEKLY
Status: ON HOLD | COMMUNITY
Start: 2021-08-03 | End: 2022-06-13 | Stop reason: HOSPADM

## 2022-03-18 RX ORDER — ACETAMINOPHEN 500 MG
1000 TABLET ORAL EVERY 6 HOURS PRN
COMMUNITY

## 2022-03-18 ASSESSMENT — ENCOUNTER SYMPTOMS
NAUSEA: 0
BACK PAIN: 0
CONSTIPATION: 0
SHORTNESS OF BREATH: 0
COLOR CHANGE: 0
TROUBLE SWALLOWING: 0
COUGH: 0

## 2022-03-18 NOTE — PROGRESS NOTES
Neurosurgery Follow up Note    Date:3/18/2022         Patient Mejia Mendez     Date of Birth:[birth year]     Age:61 y.o. Reason for Follow up: Follow up for 6 month follow up with CT       Chief Complaint:   Chief Complaint   Patient presents with    Follow-up     6 mo follow up        Subjective   Rachelle Bowers is a 64year old female who presents in a wheelchair with her brother. Patient is a resident at [facility]  Patient had surgery on 3/22/2021 with Dr. Huber Minaya for reexploration revision of ventral peritoneal shunt performed by Dr. Chau Glass in assisted by Dr. Lux Kenyon, without complication. She was last seen and evaluated in our office 11/5/2021 with a well-healed incision and shunt setting checked and verified at 10. Patient stated in December 2021 patient had episodes of slurred and stuttering speech. Patient stated that she has been taking her Keppra as prescribed. Patient went to [facility] where a CT of her head was completed which showed no evidence of acute intracranial findings. Stable right frontal approach ventriculostomy catheter crossing midline with tip entering the left caudate. Patient stated that the emergency room physician at [facility] stated that she had a seizure. Patient has had slurred and stuttering of speech since December. Patient stated that she is having memory issues. She stated that she is undergoing ST at [facility]. Patient stated she has also had tremors of her upper extremities. Patient denies headache, dizziness, weakness, blurred vision, double vision or lightheadedness. Patient denies seizure activity patient is a patient of Dr. Princess Spence. Patient states she does not remember the last time that she followed with Dr. Princess Spence. Patient denies recent fall or trauma. Review of Systems   Review of Systems   Constitutional: Negative for activity change, appetite change, fatigue and fever.    HENT: Negative for trouble swallowing. Stuttering speech   Eyes: Negative for visual disturbance. Respiratory: Negative for cough and shortness of breath. Cardiovascular: Negative for chest pain. Gastrointestinal: Negative for constipation and nausea. Genitourinary: Negative for difficulty urinating. Musculoskeletal: Negative for back pain and gait problem. Skin: Negative for color change, rash and wound. Neurological: Positive for tremors and speech difficulty. Negative for dizziness, seizures, weakness and headaches. Psychiatric/Behavioral: Negative for confusion and sleep disturbance. The patient is not nervous/anxious.         Medications     Current Outpatient Medications on File Prior to Visit   Medication Sig Dispense Refill    acetaminophen (TYLENOL) 325 MG tablet Take 1,000 mg by mouth every 6 hours      ondansetron (ZOFRAN-ODT) 4 MG disintegrating tablet Take 4 mg by mouth daily      ketoconazole (NIZORAL) 2 % cream Apply topically 2 times daily      loratadine (CLARITIN) 10 MG tablet Take 1 tablet by mouth daily      apixaban (ELIQUIS) 5 MG TABS tablet Take 5 mg by mouth daily      bisacodyl (DULCOLAX) 10 MG suppository 10 mg daily      levETIRAcetam (KEPPRA) 1000 MG tablet Take 1 tablet by mouth 2 times daily 60 tablet 0    magnesium oxide (MAG-OX) 400 (241.3 Mg) MG TABS tablet Take 1 tablet by mouth 2 times daily 30 tablet     nystatin (MYCOSTATIN) 893179 UNIT/ML suspension Take 5 mLs by mouth 4 times daily      risperiDONE (RISPERDAL) 0.5 MG tablet Take 0.5 mg by mouth 2 times daily      senna (SENOKOT) 8.6 MG tablet Take 1 tablet by mouth 2 times daily      amLODIPine (NORVASC) 5 MG tablet Take 5 mg by mouth daily      atorvastatin (LIPITOR) 20 MG tablet Take 20 mg by mouth nightly      losartan (COZAAR) 100 MG tablet Take 100 mg by mouth daily      Simethicone 80 MG TABS Take 80 mg by mouth every 8 hours as needed      ciprofloxacin (CIPRO) 500 MG tablet Take 250 mg by mouth 2 times daily (Patient not taking: Reported on 3/18/2022)       No current facility-administered medications on file prior to visit. Past History    Past Medical History:   has a past medical history of Brain aneurysm, Hypertension, and Seizures ([address] Social History:   reports that she quit smoking about 6 years ago. She has never used smokeless tobacco. She reports that she does not drink alcohol and does not use drugs. Family History:   Family History   Problem Relation Age of Onset   Rosado Saliva Cancer Mother         Breast, Lung    Diabetes Father     Cancer Father         colon    Alzheimer's Disease Father     ADHD Father        Physical Examination      Vitals:  /80 (Site: Right Lower Arm, Position: Sitting, Cuff Size: Small Adult)   Pulse 57   Ht 5' 5.98\" (1.676 m)   Wt 294 lb 15.6 oz (133.8 kg)   BMI 47.63 kg/m²       Physical Exam  Constitutional:       Appearance: Normal appearance. She is obese. She is not ill-appearing. HENT:      Mouth/Throat:      Mouth: Mucous membranes are moist.   Eyes:      Pupils: Pupils are equal, round, and reactive to light. Cardiovascular:      Rate and Rhythm: Normal rate. Pulses: Normal pulses. Pulmonary:      Effort: Pulmonary effort is normal.   Abdominal:      General: Bowel sounds are normal.   Musculoskeletal:         General: No tenderness. Cervical back: No rigidity or tenderness. Skin:     General: Skin is warm and dry. Neurological:      Mental Status: She is alert and oriented to person, place, and time. Sensory: No sensory deficit. Motor: No weakness. Comments: Tremors in upper extremities   Psychiatric:         Mood and Affect: Mood normal.         Behavior: Behavior normal.         Thought Content: Thought content normal.         Judgment: Judgment normal.       Neurologic Exam     Mental Status   Oriented to person, place, and time.      Cranial Nerves     CN III, IV, VI   Pupils are equal, round, and reactive to light.       Imaging   Imaging last 30 days:  No results found. Assessment and Plan:          1. 6 month follow up with CT of the head  2. CT of the head discussed with patient   3. Doing adequate overall  4. Fall precautions  5. Follow up with Dr. Susan Morelos as she has not followed since she was told she had seizures in December   6. Shunt series Xray  7. CT head with  8. Obtain Speech therapy evals form nursing home   9. Shunt setting verified at 10.  10. Seizure precautions  11. Follow up in 2 weeks. 12. All patient questions answered. Pt voiced understanding.  Patient instructed to call the office with any questions or concerns      Electronically signed by NEPTALI Faustin CNP on 3/18/22 at 9:05 AM EDT

## 2022-06-07 ENCOUNTER — TELEPHONE (OUTPATIENT)
Dept: NEUROLOGY | Age: 62
End: 2022-06-07

## 2022-06-07 ENCOUNTER — HOSPITAL ENCOUNTER (INPATIENT)
Age: 62
LOS: 6 days | Discharge: NURSING, REHAB OR LONG-TERM CARE | DRG: 270 | End: 2022-06-13
Attending: FAMILY MEDICINE | Admitting: INTERNAL MEDICINE
Payer: MEDICARE

## 2022-06-07 DIAGNOSIS — I67.1 ANEURYSM OF RIGHT INTERNAL CAROTID ARTERY: Primary | ICD-10-CM

## 2022-06-07 LAB
ANION GAP SERPL CALCULATED.3IONS-SCNC: 15 MEQ/L (ref 8–16)
APTT: 32 SECONDS (ref 22–38)
BASOPHILS # BLD: 0.4 %
BASOPHILS ABSOLUTE: 0 THOU/MM3 (ref 0–0.1)
BUN BLDV-MCNC: 18 MG/DL (ref 7–22)
CALCIUM SERPL-MCNC: 10.6 MG/DL (ref 8.5–10.5)
CHLORIDE BLD-SCNC: 103 MEQ/L (ref 98–111)
CO2: 23 MEQ/L (ref 23–33)
CREAT SERPL-MCNC: 0.7 MG/DL (ref 0.4–1.2)
EOSINOPHIL # BLD: 2.5 %
EOSINOPHILS ABSOLUTE: 0.2 THOU/MM3 (ref 0–0.4)
ERYTHROCYTE [DISTWIDTH] IN BLOOD BY AUTOMATED COUNT: 12 % (ref 11.5–14.5)
ERYTHROCYTE [DISTWIDTH] IN BLOOD BY AUTOMATED COUNT: 46.5 FL (ref 35–45)
GFR SERPL CREATININE-BSD FRML MDRD: 85 ML/MIN/1.73M2
GLUCOSE BLD-MCNC: 90 MG/DL (ref 70–108)
HCT VFR BLD CALC: 43.1 % (ref 37–47)
HEMOGLOBIN: 14.1 GM/DL (ref 12–16)
IMMATURE GRANS (ABS): 0.03 THOU/MM3 (ref 0–0.07)
IMMATURE GRANULOCYTES: 0.4 %
INR BLD: 1.23 (ref 0.85–1.13)
LYMPHOCYTES # BLD: 26.9 %
LYMPHOCYTES ABSOLUTE: 2 THOU/MM3 (ref 1–4.8)
MCH RBC QN AUTO: 34.6 PG (ref 26–33)
MCHC RBC AUTO-ENTMCNC: 32.7 GM/DL (ref 32.2–35.5)
MCV RBC AUTO: 105.6 FL (ref 81–99)
MONOCYTES # BLD: 14.2 %
MONOCYTES ABSOLUTE: 1 THOU/MM3 (ref 0.4–1.3)
NUCLEATED RED BLOOD CELLS: 0 /100 WBC
OSMOLALITY CALCULATION: 282.7 MOSMOL/KG (ref 275–300)
PLATELET # BLD: 108 THOU/MM3 (ref 130–400)
PMV BLD AUTO: 10.9 FL (ref 9.4–12.4)
POTASSIUM SERPL-SCNC: 4.5 MEQ/L (ref 3.5–5.2)
RBC # BLD: 4.08 MILL/MM3 (ref 4.2–5.4)
SEG NEUTROPHILS: 55.6 %
SEGMENTED NEUTROPHILS ABSOLUTE COUNT: 4.1 THOU/MM3 (ref 1.8–7.7)
SODIUM BLD-SCNC: 141 MEQ/L (ref 135–145)
TROPONIN T: 0.03 NG/ML
WBC # BLD: 7.3 THOU/MM3 (ref 4.8–10.8)

## 2022-06-07 PROCEDURE — 2580000003 HC RX 258: Performed by: STUDENT IN AN ORGANIZED HEALTH CARE EDUCATION/TRAINING PROGRAM

## 2022-06-07 PROCEDURE — 85730 THROMBOPLASTIN TIME PARTIAL: CPT

## 2022-06-07 PROCEDURE — 80048 BASIC METABOLIC PNL TOTAL CA: CPT

## 2022-06-07 PROCEDURE — 85025 COMPLETE CBC W/AUTO DIFF WBC: CPT

## 2022-06-07 PROCEDURE — 36415 COLL VENOUS BLD VENIPUNCTURE: CPT

## 2022-06-07 PROCEDURE — 99285 EMERGENCY DEPT VISIT HI MDM: CPT

## 2022-06-07 PROCEDURE — 6370000000 HC RX 637 (ALT 250 FOR IP): Performed by: STUDENT IN AN ORGANIZED HEALTH CARE EDUCATION/TRAINING PROGRAM

## 2022-06-07 PROCEDURE — 99223 1ST HOSP IP/OBS HIGH 75: CPT | Performed by: SOCIAL WORKER

## 2022-06-07 PROCEDURE — 85610 PROTHROMBIN TIME: CPT

## 2022-06-07 PROCEDURE — 2060000000 HC ICU INTERMEDIATE R&B

## 2022-06-07 PROCEDURE — 93005 ELECTROCARDIOGRAM TRACING: CPT | Performed by: FAMILY MEDICINE

## 2022-06-07 PROCEDURE — 84484 ASSAY OF TROPONIN QUANT: CPT

## 2022-06-07 RX ORDER — SODIUM CHLORIDE 9 MG/ML
INJECTION, SOLUTION INTRAVENOUS PRN
Status: DISCONTINUED | OUTPATIENT
Start: 2022-06-07 | End: 2022-06-10 | Stop reason: SDUPTHER

## 2022-06-07 RX ORDER — SODIUM CHLORIDE 0.9 % (FLUSH) 0.9 %
5-40 SYRINGE (ML) INJECTION PRN
Status: DISCONTINUED | OUTPATIENT
Start: 2022-06-07 | End: 2022-06-10 | Stop reason: SDUPTHER

## 2022-06-07 RX ORDER — CETIRIZINE HYDROCHLORIDE 10 MG/1
10 TABLET ORAL DAILY
Status: DISCONTINUED | OUTPATIENT
Start: 2022-06-07 | End: 2022-06-07

## 2022-06-07 RX ORDER — ATORVASTATIN CALCIUM 20 MG/1
20 TABLET, FILM COATED ORAL NIGHTLY
Status: DISCONTINUED | OUTPATIENT
Start: 2022-06-07 | End: 2022-06-13 | Stop reason: HOSPADM

## 2022-06-07 RX ORDER — ASPIRIN 81 MG/1
81 TABLET, CHEWABLE ORAL ONCE
Status: COMPLETED | OUTPATIENT
Start: 2022-06-07 | End: 2022-06-07

## 2022-06-07 RX ORDER — LEVETIRACETAM 500 MG/1
1000 TABLET ORAL 2 TIMES DAILY
Status: DISCONTINUED | OUTPATIENT
Start: 2022-06-07 | End: 2022-06-13 | Stop reason: HOSPADM

## 2022-06-07 RX ORDER — LOSARTAN POTASSIUM 50 MG/1
50 TABLET ORAL DAILY
Status: DISCONTINUED | OUTPATIENT
Start: 2022-06-08 | End: 2022-06-13 | Stop reason: HOSPADM

## 2022-06-07 RX ORDER — CETIRIZINE HYDROCHLORIDE 10 MG/1
10 TABLET ORAL DAILY
Status: DISCONTINUED | OUTPATIENT
Start: 2022-06-08 | End: 2022-06-13 | Stop reason: HOSPADM

## 2022-06-07 RX ORDER — ACETAMINOPHEN 325 MG/1
650 TABLET ORAL EVERY 6 HOURS PRN
Status: DISCONTINUED | OUTPATIENT
Start: 2022-06-07 | End: 2022-06-10 | Stop reason: SDUPTHER

## 2022-06-07 RX ORDER — ASPIRIN 81 MG/1
81 TABLET, CHEWABLE ORAL DAILY
Status: DISCONTINUED | OUTPATIENT
Start: 2022-06-08 | End: 2022-06-13 | Stop reason: HOSPADM

## 2022-06-07 RX ORDER — RISPERIDONE 0.25 MG/1
0.5 TABLET, FILM COATED ORAL 2 TIMES DAILY
Status: DISCONTINUED | OUTPATIENT
Start: 2022-06-07 | End: 2022-06-13 | Stop reason: HOSPADM

## 2022-06-07 RX ORDER — SENNA PLUS 8.6 MG/1
1 TABLET ORAL 2 TIMES DAILY
Status: DISCONTINUED | OUTPATIENT
Start: 2022-06-07 | End: 2022-06-13 | Stop reason: HOSPADM

## 2022-06-07 RX ORDER — AMLODIPINE BESYLATE 5 MG/1
5 TABLET ORAL DAILY
Status: DISCONTINUED | OUTPATIENT
Start: 2022-06-08 | End: 2022-06-13 | Stop reason: HOSPADM

## 2022-06-07 RX ORDER — AMLODIPINE BESYLATE 5 MG/1
5 TABLET ORAL DAILY
Status: DISCONTINUED | OUTPATIENT
Start: 2022-06-07 | End: 2022-06-07

## 2022-06-07 RX ORDER — SODIUM CHLORIDE 0.9 % (FLUSH) 0.9 %
5-40 SYRINGE (ML) INJECTION EVERY 12 HOURS SCHEDULED
Status: DISCONTINUED | OUTPATIENT
Start: 2022-06-07 | End: 2022-06-10 | Stop reason: SDUPTHER

## 2022-06-07 RX ORDER — ONDANSETRON 2 MG/ML
4 INJECTION INTRAMUSCULAR; INTRAVENOUS EVERY 6 HOURS PRN
Status: DISCONTINUED | OUTPATIENT
Start: 2022-06-07 | End: 2022-06-13 | Stop reason: HOSPADM

## 2022-06-07 RX ORDER — CLOPIDOGREL 300 MG/1
300 TABLET, FILM COATED ORAL ONCE
Status: COMPLETED | OUTPATIENT
Start: 2022-06-07 | End: 2022-06-07

## 2022-06-07 RX ORDER — POLYETHYLENE GLYCOL 3350 17 G/17G
17 POWDER, FOR SOLUTION ORAL DAILY PRN
Status: DISCONTINUED | OUTPATIENT
Start: 2022-06-07 | End: 2022-06-13 | Stop reason: HOSPADM

## 2022-06-07 RX ORDER — LOSARTAN POTASSIUM 50 MG/1
50 TABLET ORAL DAILY
Status: DISCONTINUED | OUTPATIENT
Start: 2022-06-07 | End: 2022-06-07

## 2022-06-07 RX ORDER — SIMETHICONE 80 MG
80 TABLET,CHEWABLE ORAL EVERY 8 HOURS PRN
Status: DISCONTINUED | OUTPATIENT
Start: 2022-06-07 | End: 2022-06-13 | Stop reason: HOSPADM

## 2022-06-07 RX ORDER — ONDANSETRON 4 MG/1
4 TABLET, ORALLY DISINTEGRATING ORAL EVERY 8 HOURS PRN
Status: DISCONTINUED | OUTPATIENT
Start: 2022-06-07 | End: 2022-06-13 | Stop reason: HOSPADM

## 2022-06-07 RX ORDER — ACETAMINOPHEN 650 MG/1
650 SUPPOSITORY RECTAL EVERY 6 HOURS PRN
Status: DISCONTINUED | OUTPATIENT
Start: 2022-06-07 | End: 2022-06-10 | Stop reason: SDUPTHER

## 2022-06-07 RX ORDER — CLOPIDOGREL BISULFATE 75 MG/1
300 TABLET ORAL ONCE
Status: COMPLETED | OUTPATIENT
Start: 2022-06-08 | End: 2022-06-08

## 2022-06-07 RX ADMIN — APIXABAN 5 MG: 5 TABLET, FILM COATED ORAL at 21:01

## 2022-06-07 RX ADMIN — ATORVASTATIN CALCIUM 20 MG: 20 TABLET, FILM COATED ORAL at 21:01

## 2022-06-07 RX ADMIN — RISPERIDONE 0.5 MG: 0.25 TABLET ORAL at 21:00

## 2022-06-07 RX ADMIN — LEVETIRACETAM 1000 MG: 500 TABLET, FILM COATED ORAL at 21:00

## 2022-06-07 RX ADMIN — ASPIRIN 81 MG 81 MG: 81 TABLET ORAL at 17:48

## 2022-06-07 RX ADMIN — SENNOSIDES 8.6 MG: 8.6 TABLET, COATED ORAL at 21:00

## 2022-06-07 RX ADMIN — SODIUM CHLORIDE, PRESERVATIVE FREE 10 ML: 5 INJECTION INTRAVENOUS at 21:01

## 2022-06-07 RX ADMIN — CLOPIDOGREL BISULFATE 300 MG: 300 TABLET, FILM COATED ORAL at 17:48

## 2022-06-07 ASSESSMENT — ENCOUNTER SYMPTOMS
ABDOMINAL PAIN: 0
BACK PAIN: 0
SHORTNESS OF BREATH: 0
COUGH: 0
COLOR CHANGE: 0
PHOTOPHOBIA: 0
SORE THROAT: 0
DIARRHEA: 0
TROUBLE SWALLOWING: 0
BLOOD IN STOOL: 0
VOMITING: 0
NAUSEA: 0

## 2022-06-07 ASSESSMENT — PAIN SCALES - WONG BAKER: WONGBAKER_NUMERICALRESPONSE: 2

## 2022-06-07 ASSESSMENT — VISUAL ACUITY: VA_NORMAL: 1

## 2022-06-07 NOTE — TELEPHONE ENCOUNTER
After looking at referral from Dr. Ren Shape office for an aneurysm (scanned into [employer]), and [facility] Dr[facility] Idalia Clay told me to call [facility] and have them bring her to the ED to be admitted for being high risk due to the aneurysm that was shown. Spoke with Gary Thomas, Nurse Manager at [facility], regarding Dr. Brittanie Gonzalez recommendations.   She called back and stated she notified POA and transport would have her at [address] [place] [address] ED around 5:30pm.

## 2022-06-07 NOTE — ED NOTES
Pt transported to [facility] in stable condition.  Spoke with DONALD Almazan prior to [place]  06/07/22 [id number]

## 2022-06-07 NOTE — ED PROVIDER NOTES
[place] ENCOUNTER          Pt Name: Mohit Cabral  MRN: [id number]  [place] 1960  Date of evaluation: 6/7/2022  Treating Resident Physician: Beth Miranda MD  Supervising Physician: Bobbi Snell MD    [address] [place] [address]       Chief Complaint   Patient presents with    Abnormal Test Results     History obtained from the patient and Neurointerventional team.      HISTORY OF PRESENT ILLNESS    HPI  Mohit Cabral is a 58 y.o. female with with a known intracerebral aneurysm who presents to the emergency department for evaluation of of the aneurysm. I was notified about this patient by Dr. Niko Henriquez, neuro interventionalists. Patient was instructed to come to ED for further interventional evaluation. Patient reports chronic headaches and right-sided tremors at baseline. She does not report any worsening of the headache, vision changes, neck pain, syncope, new weakness or numbness. The patient has no other acute complaints at this time. REVIEW OF SYSTEMS   Review of Systems   Constitutional: Negative for chills, diaphoresis, fatigue and fever. HENT: Negative for sore throat and trouble swallowing. Eyes: Negative for visual disturbance. Respiratory: Negative for cough and shortness of breath. Cardiovascular: Negative for chest pain, palpitations and leg swelling. Gastrointestinal: Negative for abdominal pain, blood in stool, diarrhea, nausea and vomiting. Genitourinary: Negative for dysuria, hematuria and urgency. Musculoskeletal: Negative for arthralgias, back pain, myalgias and neck pain. Skin: Negative for rash. Neurological: Positive for headaches. Negative for seizures, syncope, weakness and numbness.          PAST MEDICAL AND SURGICAL HISTORY     Past Medical History:   Diagnosis Date    Brain aneurysm     Hypertension     Seizures ([facility] [address]      Past Surgical History:   Procedure Laterality Date     SECTION      x2    GALLBLADDER SURGERY      partial     VENTRICULOPERITONEAL SHUNT N/A 3/22/2021    VENTRICULAR PERITONEAL SHUNT REVISION performed by Jennifer Armendariz MD at [address]     Current Facility-Administered Medications:     clopidogrel (PLAVIX) tablet 300 mg, 300 mg, Oral, Once, Marv Malin MD    aspirin chewable tablet 81 mg, 81 mg, Oral, Once, Marv Malin MD    Current Outpatient Medications:     acetaminophen (TYLENOL) 325 MG tablet, Take 1,000 mg by mouth every 6 hours, Disp: , Rfl:     ondansetron (ZOFRAN-ODT) 4 MG disintegrating tablet, Take 4 mg by mouth daily, Disp: , Rfl:     ketoconazole (NIZORAL) 2 % cream, Apply topically 2 times daily, Disp: , Rfl:     loratadine (CLARITIN) 10 MG tablet, Take 1 tablet by mouth daily, Disp: , Rfl:     apixaban (ELIQUIS) 5 MG TABS tablet, Take 5 mg by mouth daily, Disp: , Rfl:     ciprofloxacin (CIPRO) 500 MG tablet, Take 250 mg by mouth 2 times daily (Patient not taking: Reported on 3/18/2022), Disp: , Rfl:     bisacodyl (DULCOLAX) 10 MG suppository, 10 mg daily, Disp: , Rfl:     levETIRAcetam (KEPPRA) 1000 MG tablet, Take 1 tablet by mouth 2 times daily, Disp: 60 tablet, Rfl: 0    magnesium oxide (MAG-OX) 400 (241.3 Mg) MG TABS tablet, Take 1 tablet by mouth 2 times daily, Disp: 30 tablet, Rfl:     nystatin (MYCOSTATIN) 145157 UNIT/ML suspension, Take 5 mLs by mouth 4 times daily, Disp:  , Rfl:     risperiDONE (RISPERDAL) 0.5 MG tablet, Take 0.5 mg by mouth 2 times daily, Disp: , Rfl:     senna (SENOKOT) 8.6 MG tablet, Take 1 tablet by mouth 2 times daily, Disp: , Rfl:     amLODIPine (NORVASC) 5 MG tablet, Take 5 mg by mouth daily, Disp: , Rfl:     atorvastatin (LIPITOR) 20 MG tablet, Take 20 mg by mouth nightly, Disp: , Rfl:     losartan (COZAAR) 100 MG tablet, Take 100 mg by mouth daily, Disp: , Rfl:     Simethicone 80 MG TABS, Take 80 mg by mouth every 8 hours as needed, Disp: , Rfl:       SOCIAL HISTORY Social History     Social History Narrative    Not on file     Social History     Tobacco Use    Smoking status: Former Smoker     Quit date: 2016     Years since quittin.2    Smokeless tobacco: Never Used   Vaping Use    Vaping Use: Never used   Substance Use Topics    Alcohol use: Never    Drug use: Never         ALLERGIES     Allergies   Allergen Reactions    Geodon [Ziprasidone]          FAMILY HISTORY     Family History   Problem Relation Age of Onset    Cancer Mother         Breast, Lung    Diabetes Father     Cancer Father         colon    Alzheimer's Disease Father     ADHD Father          PREVIOUS RECORDS   Previous records reviewed: I reviewed the patient's past medical records including relevant labs, imaging and procedures. CTA on  revealed 1.4 cm saccular aneurysm along the anterior lateral aspect of the cavernous right ICA projecting towards right orbital apex         PHYSICAL EXAM     ED Triage Vitals [22 1658]   BP Temp Temp Source Heart Rate Resp SpO2 Height Weight   136/89 98.4 °F (36.9 °C) Oral 67 16 94 % 5' 5\" (1.651 m) 294 lb (133.4 kg)     Initial vital signs and nursing assessment reviewed and normal. Body mass index is 48.92 kg/m². Pulsoximetry is normal per my interpretation. Additional Vital Signs:  Vitals:    22 1658   BP: 136/89   Pulse: 67   Resp: 16   Temp: 98.4 °F (36.9 °C)   SpO2: 94%       Physical Exam  Vitals and nursing note reviewed. Constitutional:       Appearance: Normal appearance. HENT:      Head: Normocephalic and atraumatic. Right Ear: Tympanic membrane normal.      Left Ear: Tympanic membrane normal.      Nose: Nose normal.      Mouth/Throat:      Mouth: Mucous membranes are moist.      Pharynx: Oropharynx is clear. No oropharyngeal exudate. Eyes:      General: No scleral icterus. Extraocular Movements: Extraocular movements intact.       Conjunctiva/sclera: Conjunctivae normal.      Pupils: Pupils are equal, round, and reactive to light. Cardiovascular:      Rate and Rhythm: Normal rate and regular rhythm. Pulses: Normal pulses. Heart sounds: Normal heart sounds. No murmur heard. No friction rub. No gallop. Pulmonary:      Effort: Pulmonary effort is normal. No respiratory distress. Breath sounds: Normal breath sounds. Abdominal:      Palpations: Abdomen is soft. Tenderness: There is no abdominal tenderness. There is no right CVA tenderness, left CVA tenderness, guarding or rebound. Musculoskeletal:         General: No swelling or tenderness. Normal range of motion. Cervical back: Normal range of motion and neck supple. Right lower leg: No edema. Left lower leg: No edema. Skin:     General: Skin is warm and dry. Capillary Refill: Capillary refill takes less than 2 seconds. Neurological:      General: No focal deficit present. Mental Status: She is alert and oriented to person, place, and time. Cranial Nerves: No cranial nerve deficit. Motor: No weakness. Comments: Tremors in the right upper extremity             MEDICAL DECISION MAKING   Initial Assessment:   71-year-old female presenting with known carotid aneurysm      Differential diagnosis includes but is not limited to:  Carotid aneurysm    Although some of these diagnoses are unlikely they were considered in my medical decision making.     Plan:    Discussed with neuro interventional team        ED RESULTS   Laboratory results:  Labs Reviewed   CBC WITH AUTO DIFFERENTIAL   BASIC METABOLIC PANEL   TROPONIN   PROTIME-INR   APTT       Radiologic studies results:  No orders to display       ED Medications administered this visit:   Medications   clopidogrel (PLAVIX) tablet 300 mg (has no administration in time range)   aspirin chewable tablet 81 mg (has no administration in time range)         ED COURSE     ED Course as of 06/07/22 [address] [place] [address] Jun 07, 2022   1803 Neuro interventional Carlitos PONCE states that plan is for endovascular intervention this Friday. Was instructed to load 300 of Plavix and 81 of aspirin in lieu of the procedure [TM]      ED Course User Index  [TM] Catalina Charles MD         MDM:   I discussed the case with neuro interventional team.  Plan is to coil the aneurysm this upcoming Friday. I was instructed to load 300 of Plavix 81 of aspirin and lieu of the procedure. We will admit to hospital medicine for medical management        MEDICATION CHANGES     New Prescriptions    No medications on file         FINAL DISPOSITION     Final diagnoses:   Aneurysm of right internal carotid artery     Condition: condition: stable  Dispo: Admit to CCU/ICU      This transcription was electronically signed. Parts of this transcriptions may have been dictated by use of voice recognition software and electronically transcribed, and parts may have been transcribed with the assistance of an ED scribe. The transcription may contain errors not detected in proofreading. Please refer to my supervising physician's documentation if my documentation differs.     Electronically Signed: Rajendra Ozuna MD, 06/07/22, 5:44 PM         Catalina Charles MD  Resident  06/07/22 4651 [facility] [place] Kalani Grullon MD  Resident  06/07/22 [id number]

## 2022-06-07 NOTE — CONSULTS
Neurology Consult Note    Date:6/7/2022       [id number]  Patient Brian Willard     YOB: 1960     Age:62 y.o. 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 Pt residing at a nursing facility since 2019 after her aneurysmal rupture. Patient reports that she has daily headaches, speech difficulty and lower extremity sensory change that are chronic. She also reports tremors that are a recent development but have been attributed to medication side effect by her outpatient neurologist, med to be weaned by psychiatry. Pt denies syncope, unilateral weakness, vision changes. Review of Systems   Review of Systems   Constitutional: Negative for activity change, fatigue and fever. HENT: Negative for tinnitus and trouble swallowing. Eyes: Negative for photophobia and visual disturbance. Respiratory: Negative for cough and shortness of breath. Cardiovascular: Positive for leg swelling. Negative for chest pain and palpitations. Gastrointestinal: Negative for diarrhea, nausea and vomiting. Genitourinary: Negative for dysuria and flank pain. Musculoskeletal: Negative for neck pain and neck stiffness. Skin: Negative for color change and rash. Neurological: Positive for tremors, speech difficulty, numbness and headaches. Negative for dizziness, facial asymmetry and weakness.    Psychiatric/Behavioral: Negative for agitation and confusion. Medications   Scheduled Meds:    sodium chloride flush  5-40 mL IntraVENous 2 times per day    atorvastatin  20 mg Oral Nightly    apixaban  5 mg Oral BID    levETIRAcetam  1,000 mg Oral BID    risperiDONE  0.5 mg Oral BID    senna  1 tablet Oral BID    [START ON 2022] amLODIPine  5 mg Oral Daily    [START ON 2022] cetirizine  10 mg Oral Daily    [START ON 2022] losartan  50 mg Oral Daily     Continuous Infusions:    sodium chloride       PRN Meds: sodium chloride flush, sodium chloride, ondansetron **OR** ondansetron, polyethylene glycol, acetaminophen **OR** acetaminophen, simethicone    Past History    Past Medical History:   has a past medical history of Brain aneurysm, Hypertension, and Seizures ([facility] [address] Social History:   reports that she quit smoking about 6 years ago. She has never used smokeless tobacco. She reports that she does not drink alcohol and does not use drugs. Family History:   Family History   Problem Relation Age of Onset    Cancer Mother         Breast, Lung    Diabetes Father     Cancer Father         colon    Alzheimer's Disease Father     ADHD Father        Physical Examination      Vitals:  BP (!) 147/93   Pulse 68   Temp 98 °F (36.7 °C) (Oral)   Resp 20   Ht 5' 5\" (1.651 m)   Wt (!) 323 lb 3.1 oz (146.6 kg)   SpO2 92%   BMI 53.78 kg/m²   Temp (24hrs), Av.2 °F (36.8 °C), Min:98 °F (36.7 °C), Max:98.4 °F (36.9 °C)      I/O (24Hr): No intake or output data in the 24 hours ending 22    Physical Exam  Constitutional:       General: She is not in acute distress. Appearance: She is obese. HENT:      Head: Normocephalic and atraumatic. Right Ear: External ear normal.      Left Ear: External ear normal.      Nose: Nose normal.      Mouth/Throat:      Mouth: Mucous membranes are moist.      Pharynx: Oropharynx is clear. Eyes:      General: No scleral icterus.      Extraocular Movements: Extraocular movements intact and EOM normal.      Pupils: Pupils are equal, round, and reactive to light. Cardiovascular:      Rate and Rhythm: Normal rate and regular rhythm. Heart sounds: No murmur heard. No friction rub. No gallop. Pulmonary:      Effort: Pulmonary effort is normal. No respiratory distress. Breath sounds: Normal breath sounds. Abdominal:      General: Abdomen is flat. There is no distension. Palpations: Abdomen is soft. Tenderness: There is no abdominal tenderness. Musculoskeletal:         General: No deformity or signs of injury. Skin:     General: Skin is warm and dry. Neurological:      Mental Status: She is alert. Coordination: Finger-Nose-Finger Test normal. Heel-to-shin test: Unable to complete. Deep Tendon Reflexes:      Reflex Scores:       Bicep reflexes are 2+ on the right side and 2+ on the left side. Brachioradialis reflexes are 2+ on the right side and 2+ on the left side. Psychiatric:         Mood and Affect: Mood normal.         Behavior: Behavior normal.       Neurologic Exam     Mental Status   Oriented to person. Oriented to place. Disoriented to day. Oriented to year and month. Attention: normal.   Level of consciousness: alert  Knowledge: poor. Able to name object. Able to repeat. Abnormal comprehension. Expressive speech clear but delayed and with word finding difficulty. Receptive language with delay and occasional  difficulty with comprehension. Cranial Nerves     CN II   Visual fields full to confrontation. Visual acuity: normal    CN III, IV, VI   Pupils are equal, round, and reactive to light. Extraocular motions are normal.   Right pupil: Size: 3 mm. Shape: regular. Reactivity: brisk. Left pupil: Size: 3 mm. Shape: regular. Reactivity: brisk. CN V   Facial sensation intact. Right facial sensation deficit: none  Left facial sensation deficit: none    CN VII   Facial expression full, symmetric.    Right facial weakness: none  Left facial weakness: none    CN VIII   CN VIII normal.   Hearing: intact    CN IX, X   CN IX normal.   Palate: symmetric    CN XI   CN XI normal.   Right sternocleidomastoid strength: normal  Left sternocleidomastoid strength: normal  Right trapezius strength: normal  Left trapezius strength: normal    CN XII   CN XII normal.   Tongue: not atrophic  Fasciculations: absent  Tongue deviation: none    Motor Exam   Muscle bulk: normal  Overall muscle tone: normal  Strength:  BUE: 4+/5 at shoulders and elbows, 3/5  strength  BLE: 3/5     Sensory Exam   Right arm light touch: normal  Left arm light touch: normal  Right leg light touch: decreased from ankle  Left leg light touch: decreased from ankle (worse on the left)    Gait, Coordination, and Reflexes     Coordination   Finger to nose coordination: normal  Heel-to-shin test: Unable to complete. Tremor   Resting tremor: present  Action tremor: left arm and right arm    Reflexes   Right brachioradialis: 2+  Left brachioradialis: 2+  Right biceps: 2+  Left biceps: 2+     Labs/Imaging/Diagnostics   Labs:  CBC:  Recent Labs     06/07/22  1750   WBC 7.3   RBC 4.08*   HGB 14.1   HCT 43.1   .6*   *     CHEMISTRIES:  Recent Labs     06/07/22  1750      K 4.5      CO2 23   BUN 18   CREATININE 0.7   GLUCOSE 90     PT/INR:  Recent Labs     06/07/22  1750   INR 1.23*     APTT:  Recent Labs     06/07/22  1750   APTT 32.0     LIVER PROFILE:No results for input(s): AST, ALT, BILIDIR, BILITOT, ALKPHOS in the last 72 hours. Imaging Last 24 Hours:  No results found. Assessment and Plan:          1. 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 Also demonstrates anterior communicating artery coils/artifact.    · Plan for angiogram with with aneurysm treatment (possible flow diverter) on Thursday or Friday  · Start ASA 81mg daily  · Plavix 300mg today  · Plavix 300mg tomorrow  · VerifyNow/P2Y12 platelet inhibition test tomorrow evening, ordered  · Maintain BP <130/80  · Neurology following    This case was discussed with Dr. Chad Barajas and he is in agreement with the assessment and plan.     Electronically signed by Neda Richards PA-C on 6/7/22 at 7:55 PM EDT

## 2022-06-07 NOTE — PROGRESS NOTES
Patient admitted to 4A Room 12 from ED. Vital signs obtained. Oriented to room. Policies and procedures for 4a explained. All questions answered with no further questions at this time. Fall prevention and safety discussed with patient. 2 person skin check completed.

## 2022-06-07 NOTE — ED NOTES
Pt to er. Pt brought in for aneurysm in neck. States had CTA about a week ago and was told to come to ER today. Pt states has had headaches lately so that is why she got the CT completed. Pt from [facility] [address]. Pt is bed bound. Pt a & o x 3. Resp regular. Pupils equal and reactive. Call light in reach.       Roxane Wooten RN  06/07/22 4630

## 2022-06-07 NOTE — H&P
History & Physical       Patient: Olga Rudd  YOB: 1960    MRN: [id number]     Acct: [de-identified]    PCP: No primary care provider on file. Date of Admission: 6/7/2022    Date of Service: Patient seen / examined on 06/07/22 and admitted to Inpatient with expected LOS greater than two midnights due to medical therapy. ASSESSMENT / PLAN:  1. Saccular Aneurysm: 1.4cm noted on CTA Head on 5/31/21, full report in Media. Apparently not significantly changed compared to their prior imaging. Patient is asymptomatic. Loaded with ASA and Plavix in the ED. Spoke with Neurology who recommends to continue ASA 81mg QD, Plavix 300mg load again tomorrow, NPO after midnight on Wednesday with plans for possible Flow Diverter placement +/- coiling either Thursday or Friday. Neuro also recommends to continue Eliquis BID (Unsure of indication as patient does not have any noted history of DVT/PE, Atrial Fibrillation - Will need to call NH to clarify). 2. Hx Brain Aneurysms: In 2019 that ruptured s/p  Shunt placement in 2019.   3. Elevated Troponin: Initial Trop 0.027. EKG does not show any acute ischemic changes, not significantly changed compared to prior. Patient denies any CP. Will obtain Trop x2.   4. HTN: Continue home meds  5. Seizures: Continue Keppra 1000mg BID. Seizure, fall precautions. 6. HLD: Statin    Chief Complaint:  Abnormal CTA Head Result    History of Present Illness:  Patient is a 59 y/o Female with PMH Essential HTN, HLD, Seizures, Brain Aneurysms s/p  Shunt Placement in 2019. She presents to [facility] ED after having outpatient imaging that reported a 1.4cm saccular aneurysm along the anterolateral aspect of the cavernous right internal carotid artery projecting anteriorly towards the right orbital apex although unchanged compared to prior on 5/31/21.  She states that she was advised by her Neurologist Dr. Linda Mcqueen to get admitted for further evaluation and management. Neurology was notified by the ED Provider who spoke with Dr. Ortega Jenkins who recommended her start on Aspirin and Plavix 300mg load with plan for possible coiling on Friday. Patient herself states she feels well and has no complaints at this time. She denies any dizziness, lightheadedness, chest pain, palpitations, shortness of breath, abdominal pain, N/V/D. Past Medical History:    Past Medical History:   Diagnosis Date    Brain aneurysm     Hypertension     Seizures ([address]      Past Surgical History:    Past Surgical History:   Procedure Laterality Date     SECTION      x2    GALLBLADDER SURGERY      partial     VENTRICULOPERITONEAL SHUNT N/A 3/22/2021    VENTRICULAR PERITONEAL SHUNT REVISION performed by [place]ita [place] MD at [facility]      Medications Prior to Admission:   No current facility-administered medications on file prior to encounter.      Current Outpatient Medications on File Prior to Encounter   Medication Sig Dispense Refill    acetaminophen (TYLENOL) 325 MG tablet Take 1,000 mg by mouth every 6 hours      ondansetron (ZOFRAN-ODT) 4 MG disintegrating tablet Take 4 mg by mouth daily      ketoconazole (NIZORAL) 2 % cream Apply topically 2 times daily      loratadine (CLARITIN) 10 MG tablet Take 1 tablet by mouth daily      apixaban (ELIQUIS) 5 MG TABS tablet Take 5 mg by mouth daily      ciprofloxacin (CIPRO) 500 MG tablet Take 250 mg by mouth 2 times daily (Patient not taking: Reported on 3/18/2022)      bisacodyl (DULCOLAX) 10 MG suppository 10 mg daily      levETIRAcetam (KEPPRA) 1000 MG tablet Take 1 tablet by mouth 2 times daily 60 tablet 0    magnesium oxide (MAG-OX) 400 (241.3 Mg) MG TABS tablet Take 1 tablet by mouth 2 times daily 30 tablet     nystatin (MYCOSTATIN) 459819 UNIT/ML suspension Take 5 mLs by mouth 4 times daily      risperiDONE (RISPERDAL) 0.5 MG tablet Take 0.5 mg by mouth 2 times daily      senna (SENOKOT) 8.6 MG tablet Take 1 tablet by mouth 2 times daily      amLODIPine (NORVASC) 5 MG tablet Take 5 mg by mouth daily      atorvastatin (LIPITOR) 20 MG tablet Take 20 mg by mouth nightly      losartan (COZAAR) 100 MG tablet Take 100 mg by mouth daily      Simethicone 80 MG TABS Take 80 mg by mouth every 8 hours as needed       Allergies:   Geodon [ziprasidone]    Social History:   Social History     Socioeconomic History    Marital status: Legally      Spouse name: Not on file    Number of children: Not on file    Years of education: Not on file    Highest education level: Not on file   Occupational History    Not on file   Tobacco Use    Smoking status: Former Smoker     Quit date: 2016     Years since quittin.2    Smokeless tobacco: Never Used   Vaping Use    Vaping Use: Never used   Substance and Sexual Activity    Alcohol use: Never    Drug use: Never    Sexual activity: Not on file   Other Topics Concern    Not on file   Social History Narrative    Not on file     Social Determinants of Health     Financial Resource Strain:     Difficulty of Paying Living Expenses: Not on file   Food Insecurity:     Worried About Running Out of Food in the Last Year: Not on file    Chrissy of Food in the Last Year: Not on file   Transportation Needs:     Lack of Transportation (Medical): Not on file    Lack of Transportation (Non-Medical):  Not on file   Physical Activity:     Days of Exercise per Week: Not on file    Minutes of Exercise per Session: Not on file   Stress:     Feeling of Stress : Not on file   Social Connections:     Frequency of Communication with Friends and Family: Not on file    Frequency of Social Gatherings with Friends and Family: Not on file    Attends Catholic Services: Not on file    Active Member of Clubs or Organizations: Not on file    Attends Club or Organization Meetings: Not on file    Marital Status: Not on file   Intimate Partner Violence:     Fear of Current or Ex-Partner: Not on file  Emotionally Abused: Not on file    Physically Abused: Not on file    Sexually Abused: Not on file   Housing Stability:     Unable to Pay for Housing in the Last Year: Not on file    Number of Places Lived in the Last Year: Not on file    Unstable Housing in the Last Year: Not on file     Family History:    Family History   Problem Relation Age of Onset    Cancer Mother         Breast, Lung    Diabetes Father     Cancer Father         colon    Alzheimer's Disease Father     ADHD Father      REVIEW OF SYSTEMS:  A 14-point ROS was obtained and negative, with the exception of pertinent positives as listed below:  - None    PHYSICAL EXAM:  Vitals:    06/07/22 1658 06/07/22 1836   BP: 136/89 (!) 147/93   Pulse: 67 68   Resp: 16 20   Temp: 98.4 °F (36.9 °C) 98 °F (36.7 °C)   TempSrc: Oral Oral   SpO2: 94% 92%   Weight: 294 lb (133.4 kg)    Height: 5' 5\" (1.651 m)      General appearance: Morbidly obese, bed bound female. Cooperative. NAD. HEENT:  Normocephalic / atraumatic. PERRL. EOM intact. Conjunctivae appear normal.  Neck: Supple. No JVD. Respiratory: Normal respiratory effort on RA. CTAB. No wheezes / rales / rhonchi. Cardiovascular: RRR. Normal S1/S2. No murmurs / rubs / gallops. Abdomen: Soft / non-tender / non-distended. BS present. Musculoskeletal: No cyanosis or edema. Resting tremors to right arm (not new). ACE wraps in place to BLE. Skin: Warm / dry. Normal turgor. Neurologic: A/O x 3. Speech normal. Answers questions appropriately. CN intact. No obvious focal neurologic deficits. Psychiatric: Thought content / judgment / insight appear appropriate. Capillary refill: Brisk bilaterally. Peripheral pulses: +2 bilaterally.     Labs:   Results for orders placed or performed during the hospital encounter of 06/07/22   CBC with Auto Differential   Result Value Ref Range    WBC 7.3 4.8 - 10.8 thou/mm3    RBC 4.08 (L) 4.20 - 5.40 mill/mm3    Hemoglobin 14.1 12.0 - 16.0 gm/dl    Hematocrit 43.1 37.0 - 47.0 %    .6 (H) 81.0 - 99.0 fL    MCH 34.6 (H) 26.0 - 33.0 pg    MCHC 32.7 32.2 - 35.5 gm/dl    RDW-CV 12.0 11.5 - 14.5 %    RDW-SD 46.5 (H) 35.0 - 45.0 fL    Platelets 625 (L) 990 - 400 thou/mm3    MPV 10.9 9.4 - 12.4 fL    Seg Neutrophils 55.6 %    Lymphocytes 26.9 %    Monocytes 14.2 %    Eosinophils 2.5 %    Basophils 0.4 %    Immature Granulocytes 0.4 %    Segs Absolute 4.1 1.8 - 7.7 thou/mm3    Lymphocytes Absolute 2.0 1.0 - 4.8 thou/mm3    Monocytes Absolute 1.0 0.4 - 1.3 thou/mm3    Eosinophils Absolute 0.2 0.0 - 0.4 thou/mm3    Basophils Absolute 0.0 0.0 - 0.1 thou/mm3    Immature Grans (Abs) 0.03 0.00 - 0.07 thou/mm3    nRBC 0 /100 wbc   Troponin   Result Value Ref Range    Troponin T 0.027 (A) ng/ml   Protime-INR   Result Value Ref Range    INR 1.23 (H) 0.85 - 1.13   APTT   Result Value Ref Range    aPTT 32.0 22.0 - 38.0 seconds   EKG Emergency   Result Value Ref Range    Ventricular Rate 67 BPM    Atrial Rate 67 BPM    P-R Interval 156 ms    QRS Duration 74 ms    Q-T Interval 372 ms    QTc Calculation (Bazett) 393 ms    P Axis 64 degrees    R Axis 2 degrees    T Axis 69 degrees       EKG / Radiology:     EKG:  Reviewed by me -- NSR, no evidence of acute ischemia compared to prior    No results found. FEN/GI/DVT:  IVF: None  Electrolytes: Monitor and replace per protocols  Diet: Cardiac  GI PPX: No  DVT Prophylaxis: SCDs    CODE STATUS:  Full    Thank you No primary care provider on file. for the opportunity to be involved in this patient's care.     Electronically signed by Madelyn Maddox DO on 6/7/2022 at 6:38 PM

## 2022-06-08 LAB
ANION GAP SERPL CALCULATED.3IONS-SCNC: 11 MEQ/L (ref 8–16)
BUN BLDV-MCNC: 16 MG/DL (ref 7–22)
CALCIUM SERPL-MCNC: 10 MG/DL (ref 8.5–10.5)
CHLORIDE BLD-SCNC: 103 MEQ/L (ref 98–111)
CO2: 27 MEQ/L (ref 23–33)
CREAT SERPL-MCNC: 0.6 MG/DL (ref 0.4–1.2)
EKG ATRIAL RATE: 52 BPM
EKG ATRIAL RATE: 67 BPM
EKG P AXIS: 64 DEGREES
EKG P AXIS: 73 DEGREES
EKG P-R INTERVAL: 140 MS
EKG P-R INTERVAL: 156 MS
EKG Q-T INTERVAL: 372 MS
EKG Q-T INTERVAL: 450 MS
EKG QRS DURATION: 74 MS
EKG QRS DURATION: 94 MS
EKG QTC CALCULATION (BAZETT): 393 MS
EKG QTC CALCULATION (BAZETT): 418 MS
EKG R AXIS: 19 DEGREES
EKG R AXIS: 2 DEGREES
EKG T AXIS: 53 DEGREES
EKG T AXIS: 69 DEGREES
EKG VENTRICULAR RATE: 52 BPM
EKG VENTRICULAR RATE: 67 BPM
ERYTHROCYTE [DISTWIDTH] IN BLOOD BY AUTOMATED COUNT: 12.2 % (ref 11.5–14.5)
ERYTHROCYTE [DISTWIDTH] IN BLOOD BY AUTOMATED COUNT: 46.5 FL (ref 35–45)
GFR SERPL CREATININE-BSD FRML MDRD: > 90 ML/MIN/1.73M2
GLUCOSE BLD-MCNC: 171 MG/DL (ref 70–108)
HCT VFR BLD CALC: 42.5 % (ref 37–47)
HEMOGLOBIN: 14.3 GM/DL (ref 12–16)
MCH RBC QN AUTO: 35 PG (ref 26–33)
MCHC RBC AUTO-ENTMCNC: 33.6 GM/DL (ref 32.2–35.5)
MCV RBC AUTO: 104.2 FL (ref 81–99)
P2Y12 ASSAY: 197 PRU (ref 180–376)
PLATELET # BLD: 113 THOU/MM3 (ref 130–400)
PMV BLD AUTO: 10.9 FL (ref 9.4–12.4)
POTASSIUM REFLEX MAGNESIUM: 4.3 MEQ/L (ref 3.5–5.2)
RBC # BLD: 4.08 MILL/MM3 (ref 4.2–5.4)
REASON FOR REJECTION: NORMAL
REASON FOR REJECTION: NORMAL
REJECTED TEST: NORMAL
REJECTED TEST: NORMAL
SODIUM BLD-SCNC: 141 MEQ/L (ref 135–145)
TROPONIN T: 0.02 NG/ML
TROPONIN T: 0.02 NG/ML
WBC # BLD: 6.6 THOU/MM3 (ref 4.8–10.8)

## 2022-06-08 PROCEDURE — 99232 SBSQ HOSP IP/OBS MODERATE 35: CPT | Performed by: HOSPITALIST

## 2022-06-08 PROCEDURE — 93010 ELECTROCARDIOGRAM REPORT: CPT | Performed by: INTERNAL MEDICINE

## 2022-06-08 PROCEDURE — 84484 ASSAY OF TROPONIN QUANT: CPT

## 2022-06-08 PROCEDURE — 6370000000 HC RX 637 (ALT 250 FOR IP): Performed by: SOCIAL WORKER

## 2022-06-08 PROCEDURE — 2060000000 HC ICU INTERMEDIATE R&B

## 2022-06-08 PROCEDURE — 93005 ELECTROCARDIOGRAM TRACING: CPT | Performed by: STUDENT IN AN ORGANIZED HEALTH CARE EDUCATION/TRAINING PROGRAM

## 2022-06-08 PROCEDURE — 2580000003 HC RX 258: Performed by: STUDENT IN AN ORGANIZED HEALTH CARE EDUCATION/TRAINING PROGRAM

## 2022-06-08 PROCEDURE — 80048 BASIC METABOLIC PNL TOTAL CA: CPT

## 2022-06-08 PROCEDURE — 6370000000 HC RX 637 (ALT 250 FOR IP): Performed by: STUDENT IN AN ORGANIZED HEALTH CARE EDUCATION/TRAINING PROGRAM

## 2022-06-08 PROCEDURE — 99232 SBSQ HOSP IP/OBS MODERATE 35: CPT | Performed by: PHYSICIAN ASSISTANT

## 2022-06-08 PROCEDURE — 36415 COLL VENOUS BLD VENIPUNCTURE: CPT

## 2022-06-08 PROCEDURE — 85027 COMPLETE CBC AUTOMATED: CPT

## 2022-06-08 PROCEDURE — 85576 BLOOD PLATELET AGGREGATION: CPT

## 2022-06-08 RX ORDER — MENTHOL 40 MG/ML
GEL TOPICAL 2 TIMES DAILY PRN
Status: ON HOLD | COMMUNITY
End: 2022-06-13 | Stop reason: HOSPADM

## 2022-06-08 RX ORDER — HYDRALAZINE HYDROCHLORIDE 20 MG/ML
10 INJECTION INTRAMUSCULAR; INTRAVENOUS EVERY 4 HOURS PRN
Status: DISCONTINUED | OUTPATIENT
Start: 2022-06-08 | End: 2022-06-12

## 2022-06-08 RX ORDER — CLOPIDOGREL BISULFATE 75 MG/1
75 TABLET ORAL DAILY
Status: DISCONTINUED | OUTPATIENT
Start: 2022-06-09 | End: 2022-06-09

## 2022-06-08 RX ORDER — NYSTATIN 100000 [USP'U]/G
POWDER TOPICAL DAILY
Status: ON HOLD | COMMUNITY
End: 2022-06-13 | Stop reason: HOSPADM

## 2022-06-08 RX ADMIN — SODIUM CHLORIDE, PRESERVATIVE FREE 10 ML: 5 INJECTION INTRAVENOUS at 20:50

## 2022-06-08 RX ADMIN — APIXABAN 5 MG: 5 TABLET, FILM COATED ORAL at 09:24

## 2022-06-08 RX ADMIN — SODIUM CHLORIDE, PRESERVATIVE FREE 10 ML: 5 INJECTION INTRAVENOUS at 09:28

## 2022-06-08 RX ADMIN — AMLODIPINE BESYLATE 5 MG: 5 TABLET ORAL at 09:24

## 2022-06-08 RX ADMIN — CLOPIDOGREL BISULFATE 300 MG: 75 TABLET ORAL at 09:24

## 2022-06-08 RX ADMIN — LOSARTAN POTASSIUM 50 MG: 50 TABLET, FILM COATED ORAL at 09:24

## 2022-06-08 RX ADMIN — ASPIRIN 81 MG CHEWABLE TABLET 81 MG: 81 TABLET CHEWABLE at 09:24

## 2022-06-08 RX ADMIN — LEVETIRACETAM 1000 MG: 500 TABLET, FILM COATED ORAL at 09:24

## 2022-06-08 RX ADMIN — RISPERIDONE 0.5 MG: 0.25 TABLET ORAL at 09:24

## 2022-06-08 RX ADMIN — SENNOSIDES 8.6 MG: 8.6 TABLET, COATED ORAL at 20:50

## 2022-06-08 RX ADMIN — LEVETIRACETAM 1000 MG: 500 TABLET, FILM COATED ORAL at 20:50

## 2022-06-08 RX ADMIN — CETIRIZINE HYDROCHLORIDE 10 MG: 10 TABLET, FILM COATED ORAL at 09:24

## 2022-06-08 RX ADMIN — RISPERIDONE 0.5 MG: 0.25 TABLET ORAL at 20:50

## 2022-06-08 RX ADMIN — ATORVASTATIN CALCIUM 20 MG: 20 TABLET, FILM COATED ORAL at 20:50

## 2022-06-08 ASSESSMENT — VISUAL ACUITY: VA_NORMAL: 1

## 2022-06-08 NOTE — PROGRESS NOTES
**OR** acetaminophen, simethicone    Past History    Past Medical History:   has a past medical history of Brain aneurysm, Hypertension, and Seizures ([address] Social History:   reports that she quit smoking about 6 years ago. She has never used smokeless tobacco. She reports that she does not drink alcohol and does not use drugs. Family History:   Family History   Problem Relation Age of Onset    Cancer Mother         Breast, Lung    Diabetes Father     Cancer Father         colon    Alzheimer's Disease Father     ADHD Father        Physical Examination      Vitals:  BP (!) 105/91   Pulse 66   Temp 98 °F (36.7 °C) (Oral)   Resp 18   Ht 5' 5\" (1.651 m)   Wt (!) 336 lb 8 oz (152.6 kg)   SpO2 95%   BMI 56.00 kg/m²   Temp (24hrs), Av.2 °F (36.8 °C), Min:98 °F (36.7 °C), Max:98.6 °F (37 °C)      I/O (24Hr): Intake/Output Summary (Last 24 hours) at 2022 0955  Last data filed at 2022 2349  Gross per 24 hour   Intake 200 ml   Output 800 ml   Net -600 ml       Physical Exam  Constitutional:       General: She is not in acute distress. Appearance: She is obese. HENT:      Head: Normocephalic and atraumatic. Right Ear: External ear normal.      Left Ear: External ear normal.      Nose: Nose normal.      Mouth/Throat:      Mouth: Mucous membranes are moist.      Pharynx: Oropharynx is clear. Eyes:      General: No scleral icterus. Extraocular Movements: Extraocular movements intact and EOM normal.      Pupils: Pupils are equal, round, and reactive to light. Cardiovascular:      Rate and Rhythm: Normal rate and regular rhythm. Heart sounds: No murmur heard. No friction rub. No gallop. Pulmonary:      Effort: Pulmonary effort is normal. No respiratory distress. Breath sounds: Normal breath sounds. Abdominal:      General: Abdomen is flat. There is no distension. Palpations: Abdomen is soft. Tenderness: There is no abdominal tenderness.    Musculoskeletal: General: No deformity or signs of injury. Skin:     General: Skin is warm and dry. Neurological:      Mental Status: She is alert and oriented to person, place, and time. Psychiatric:         Mood and Affect: Mood normal.         Speech: Speech normal.         Behavior: Behavior normal.       Neurologic Exam     Mental Status   Oriented to person, place, and time. Attention: normal. Concentration: normal.   Speech: speech is normal   Level of consciousness: alert  Normal comprehension. Cranial Nerves     CN II   Visual fields full to confrontation. Visual acuity: normal    CN III, IV, VI   Pupils are equal, round, and reactive to light. Extraocular motions are normal.   Right pupil: Size: 3 mm. Shape: regular. Reactivity: brisk. Left pupil: Size: 3 mm. Shape: regular. Reactivity: brisk. CN V   Facial sensation intact. Right facial sensation deficit: none  Left facial sensation deficit: none    CN VII   Facial expression full, symmetric.    Right facial weakness: none  Left facial weakness: none    CN VIII   CN VIII normal.   Hearing: intact    CN IX, X   CN IX normal.   Palate: symmetric    CN XI   CN XI normal.   Right trapezius strength: normal  Left trapezius strength: normal    CN XII   CN XII normal.   Tongue: not atrophic  Fasciculations: absent  Tongue deviation: none    Motor Exam   Muscle bulk: normal  Overall muscle tone: normal  Right arm pronator drift: absent  Left arm pronator drift: absent  Strength:  BUE: 4/5  BLE: 2/5     Sensory Exam   Right arm light touch: normal  Left arm light touch: normal  Right leg light touch: decreased from ankle  Left leg light touch: decreased from ankle (worse on the left)    Gait, Coordination, and Reflexes     Tremor   Resting tremor: present     Labs/Imaging/Diagnostics   Labs:  CBC:  Recent Labs     06/07/22  1750 06/08/22  0716   WBC 7.3 6.6   RBC 4.08* 4.08*   HGB 14.1 14.3   HCT 43.1 42.5   .6* 104.2*   * 113* CHEMISTRIES:  Recent Labs     06/07/22  1750 06/08/22  0812    141   K 4.5 4.3    103   CO2 23 27   BUN 18 16   CREATININE 0.7 0.6   GLUCOSE 90 171*     PT/INR:  Recent Labs     06/07/22  1750   INR 1.23*     APTT:  Recent Labs     06/07/22  1750   APTT 32.0     LIVER PROFILE:No results for input(s): AST, ALT, BILIDIR, BILITOT, ALKPHOS in the last 72 hours. Imaging Last 24 Hours:  No results found. Assessment and Plan:          1. 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 Also demonstrates anterior communicating artery coils/artifact. · Plan for angiogram with with aneurysm treatment (possible flow diverter) on Thursday or Friday  · Start ASA 81mg daily  · Plavix 300mg given yesterday and today. · Start Plavix 75mg daily tomorrow  · VerifyNow/P2Y12 platelet inhibition test tonight. · Assuming the patient has adequate platelet inhibition, we will plan to proceed with treatment of her right ICA aneurysm via flow diverter device on Friday. This was discussed with the patient and her brother who is at bedside and is the patient's medical power of . He will be able to be present for the procedure. · Maintain BP <130/80  · Neurology following    This case was discussed with Dr. Vidya Drake and he is in agreement with the assessment and plan.     Electronically signed by Bk Galvin PA-C on 6/8/22 at 2:58 PM EDT

## 2022-06-08 NOTE — PROGRESS NOTES
Pharmacy Medication History Note      List of current medications patient is taking is complete. Source of information: Drea PATE MAR, external fill history    Changes made to medication list:  Medications removed (include reason, ex. therapy complete or physician discontinued):  None    Medications added/doses adjusted:  Adjusted Eliquis from 5 mg daily to 5 mg BID  Adjusted Bisacodyl 10 mg suppository from daily to daily PRN  Adjusted ketoconazole 2% from BID to once weekly on Mondays  Adjusted Milk of Magnesium 400 mg/5 mL take 5 mL daily PRN to take 30 mL daily PRN  Adjusted Zofran 4 mg daily to 4 mg Q6H PRN  Added miconazole 2% powder BID  Added Biofreeze 4% gel twice daily PRN to neck and shoulders  Added nysatin 100,000 unit/gram powder daily under bilateral breasts     Other notes (ex. Recent course of antibiotics, Coumadin dosing):  Denies use of other OTC or herbal medications.     Electronically signed by MARIBELL Ramírez [facility] on 6/8/2022 at 1:34 PM

## 2022-06-08 NOTE — PROGRESS NOTES
Hospitalist Progress Note    Patient:  Dalila Saldana      Unit/Bed:4A-12/012-A    YOB: 1960    MRN: [id number]       Acct: [de-identified]     PCP: No primary care provider on file. Date of Admission: 6/7/2022    Assessment/Plan:    1. Saccular Aneurysm of Right ICA: 1.4cm noted on CTA Head on 5/31/21, full report in Media. Apparently not significantly changed compared to their prior imaging. Patient is asymptomatic. Loaded with ASA and Plavix in the ED. Given another Plavix 300mg today per Neurology. Continue ASA 81mg. Plan for Cerebral Angiogram with possible flow diverter Thursday or Friday. Maintain BP <130/80. 2. Hx Brain Aneurysms: s/p coiling and  shunt placement in 2019.   3. Elevated Troponin: Serial troponins have been flat. Suspect demand ischemia. Repeat EKG does not show any acute ischemic changes. Patient does not complain of CP. Will monitor for now. 4. HTN: Continue home meds. Hydralazine PRN to maintain BP <130/80. 5. Seizures: Continue Keppra 1000mg BID. Seizure, fall precautions. 6. HLD: Statin  7. Other: Discussed with PCP (Dr. Renata Vega) regarding patient being on Eliquis; he states she may have been on it in the past as DVT prophylaxis due to her prior ankle fracture. Discussed with Neurology who recommends to discontinue Eliquis as she is not indicated for it. No prior history of DVT/PE nor atrial fibrillation. Expected discharge date:  TBD    Disposition:    [] Home       [] TCU       [] Rehab       [] Psych       [] SNF       [x] [address]  [facility]       [] Other-    Chief Complaint: Abnormal CTA [facility] Course:    Patient is a 59 y/o Female with PMH Essential HTN, HLD, Seizures, Brain Aneurysms s/p  Shunt Placement in 2019.  She presents to [facility] ED after having outpatient imaging that reported a 1.4cm saccular aneurysm along the anterolateral aspect of the cavernous right internal carotid artery projecting anteriorly towards the right orbital apex although unchanged compared to prior on 5/31/21. She states that she was advised by her Neurologist Dr. Sebastián Campos to get admitted for further evaluation and management. Neurology was notified by the ED Provider who spoke with Dr. Dileep Seymour who recommended her start on Aspirin and Plavix 300mg load with plan for possible coiling on Friday. Patient herself states she feels well and has no complaints at this time. She denies any dizziness, lightheadedness, chest pain, palpitations, shortness of breath, abdominal pain, N/V/D. Subjective (past 24 hours): No significant events overnight. Patient is doing well and has no new focal neurological deficits. She denies any headaches, dizziness, lightheadedness. Tolerating breakfast. She has no questions or concerns at this time. ROS (12 point review of systems completed. Pertinent positives noted. Otherwise ROS is negative). Medications:  Reviewed    Infusion Medications    sodium chloride       Scheduled Medications    sodium chloride flush  5-40 mL IntraVENous 2 times per day    atorvastatin  20 mg Oral Nightly    apixaban  5 mg Oral BID    levETIRAcetam  1,000 mg Oral BID    risperiDONE  0.5 mg Oral BID    senna  1 tablet Oral BID    amLODIPine  5 mg Oral Daily    cetirizine  10 mg Oral Daily    losartan  50 mg Oral Daily    aspirin  81 mg Oral Daily    clopidogrel  300 mg Oral Once     PRN Meds: hydrALAZINE, sodium chloride flush, sodium chloride, ondansetron **OR** ondansetron, polyethylene glycol, acetaminophen **OR** acetaminophen, simethicone      Intake/Output Summary (Last 24 hours) at 6/8/2022 0725  Last data filed at 6/7/2022 2341  Gross per 24 hour   Intake 200 ml   Output 800 ml   Net -600 ml       Diet:  ADULT DIET;  Regular; Low Fat/Low Chol/High Fiber/2 gm Na    Exam:  /87   Pulse 64   Temp 98.3 °F (36.8 °C) (Oral)   Resp 18   Ht 5' 5\" (1.651 m)   Wt (!) 336 lb 8 oz (152.6 kg) Status: Full Code    PT/OT Eval Status: N/A    Tele:   [x] yes             [] no    Electronically signed by Tory Henriquez DO on 6/8/2022 at 7:25 AM

## 2022-06-08 NOTE — CARE COORDINATION
6/8/22, 10:55 AM EDT    DISCHARGE PLANNING EVALUATION    Spoke with Jono Pierson this morning, she is agreeable to returning to [facility] at discharge, as she is a long-term resident there. Left a message for admissions asking for a call back. 6/8/22, 11:02 AM EDT  Discharge Planning Evaluation  Social work consult received, patient from [facility]. Patient/Family preference is to return to [facility]. The patient's current payor source at the facility is [employer]/Medicaid. Medicare skilled days available: Yes  Insurance precert: Yes  Spoke with Oscar Chairez at the facility.   Patient bed hold: Medicaid bed hold  Anticipated transport plan: Unsure  Do they require COVID 19 test to return to ECF: Yes  Is there a required time frame which which COVID test needs done: 24 hours  Patient's Healthcare Decision Maker: Legal Next of Kin

## 2022-06-08 NOTE — PLAN OF CARE
Problem: Discharge Planning  Goal: Discharge to home or other facility with appropriate resources  Outcome: Progressing       Consult received. Please see SW note dated 6/08.

## 2022-06-08 NOTE — CARE COORDINATION
6/8/22, 7:59 AM EDT  DISCHARGE PLANNING EVALUATION:    Cullen Sierra       Admitted: 6/7/2022/ [facility] day: 1   Location: [facility] Reason for admit: Saccular aneurysm [I67.1]  Aneurysm of right internal carotid artery [I67.1]   PMH:  has a past medical history of Brain aneurysm, Hypertension, and Seizures ([facility] [address] Procedure: none  Barriers to Discharge:  From ED. Telemetry, seizure precautions, Neurology consult. Eliquis, Asa, Lipitor, Plavix, Keppra. PCP: No primary care provider on file. Readmission Risk Score: 14 ( )%  Patient's Healthcare Decision Maker: Legal Next of Kin  Pt states has POA. Request paperwork be brought in. Patient Goals/Plan/Treatment Preferences: Met with Charo Calles. She currently resides at [address] [place] [address]. Plan is to return at discharge.  consult in place, following. Transportation/Food Security/Housekeeping Addressed:  No issues identified.

## 2022-06-08 NOTE — DISCHARGE INSTR - COC
Continuity of Care Form    Patient Name: Gennaro Kirk   :  1960  MRN:  [id number]    Admit date:  2022  Discharge date:  ***    Code Status Order: Full Code   Advance Directives:      Admitting Physician:  Isaiah Diggs MD  PCP: No primary care provider on file. Discharging Nurse: [facility] Unit/Room#: 4A-12/012-A  Discharging Unit Phone Number: [phone]    Emergency Contact:   Extended Emergency Contact Information  Primary Emergency Contact: Stacey Hercules [facility]  Home Phone: [phone]  Mobile Phone: [phone]  Relation: Other   needed? No  Secondary Emergency Contact: Gracie Du  Home Phone: [phone]  Work Phone: [phone]  Mobile Phone: [phone]  Relation: Brother/Sister    Past Surgical History:  Past Surgical History:   Procedure Laterality Date     SECTION      x2    GALLBLADDER SURGERY      partial     VENTRICULOPERITONEAL SHUNT N/A 3/22/2021    VENTRICULAR PERITONEAL SHUNT REVISION performed by Dixon Bauer MD at [facility]       Immunization History: There is no immunization history on file for this patient.     Active Problems:  Patient Active Problem List   Diagnosis Code    Neutropenic fever (HCC) D70.9, R50.81    Septicemia ([facility] [address] A41.9    Altered mental status R41.82    S/P  shunt Z98.2    Acute encephalopathy G93.40    Pancytopenia (HCC) D61.818    Elevated LFTs R79.89    Bilateral lower extremity edema R60.0    Hypokalemia E87.6    Hypoalbuminemia E88.09    Low BUN R79.89    Hx of seizure disorder Z86.69    Essential hypertension I10    Hyperlipidemia E78.5    Unspecified mood (affective) disorder (HCC) F39       Isolation/Infection:   Isolation            No Isolation          Patient Infection Status       Infection Onset Added Last Indicated Last Indicated By Review Planned Expiration Resolved Resolved By    None active    Resolved    COVID-19 (Rule Out) 21 COVID-19, Rapid (Ordered) 03/17/21 Rule-Out Test Resulted            Nurse Assessment:  Last Vital Signs: BP (!) 133/49   Pulse 66   Temp 98.1 °F (36.7 °C) (Oral)   Resp 16   Ht 5' 5\" (1.651 m)   Wt (!) 336 lb 8 oz (152.6 kg)   SpO2 94%   BMI 56.00 kg/m²     Last documented pain score (0-10 scale):    Last Weight:   Wt Readings from Last 1 Encounters:   06/08/22 (!) 336 lb 8 oz (152.6 kg)     Mental Status:  oriented    IV Access:  - None    Nursing Mobility/ADLs:  Walking   Dependent  Transfer  Dependent  Bathing  Dependent  Dressing  Dependent  Toileting  Dependent  Feeding  Independent  Med Admin  Independent  Med Delivery   whole    Wound Care Documentation and Therapy:  Wound 03/18/21 Buttocks Stage II (Active)   Number of days: 447       Incision 03/22/21 Head Right;Upper; Lateral (Active)   Number of days: 443       Incision 03/22/21 Neck Right;Lateral (Active)   Number of days: 443       Incision 03/22/21 Abdomen Lower;Medial (Active)   Number of days: 443        Elimination:  Continence: Bowel: No  Bladder: No  Urinary Catheter: None   Colostomy/Ileostomy/Ileal Conduit: No       Date of Last BM: 6/8/2022    Intake/Output Summary (Last 24 hours) at 6/8/2022 1320  Last data filed at 6/8/2022 1233  Gross per 24 hour   Intake 200 ml   Output 2250 ml   Net -2050 ml     I/O last 3 completed shifts: In: 200 [P.O.:200]  Out: 800 [Urine:800]    Safety Concerns:     None    Impairments/Disabilities:      None    Nutrition Therapy:  Current Nutrition Therapy:   - Oral Diet:  General    Routes of Feeding: Oral  Liquids: No Restrictions  Daily Fluid Restriction: no  Last Modified Barium Swallow with Video (Video Swallowing Test): not done    Treatments at the Time of Hospital Discharge:   Respiratory Treatments: ***  Oxygen Therapy:  is not on home oxygen therapy.   Ventilator:    - No ventilator support    Rehab Therapies: Physical Therapy  Weight Bearing Status/Restrictions: No weight bearing restrictions  Other Medical Equipment (for information only, NOT a DME order):  ***  Other Treatments: ***    Patient's personal belongings (please select all that are sent with patient):  None    RN SIGNATURE:  Electronically signed by Jacquelyn Contreras RN on 6/13/22 at 1:06 PM EDT    CASE MANAGEMENT/SOCIAL WORK SECTION    Inpatient Status Date: 6/07/22    Readmission Risk Assessment Score:  Readmission Risk              Risk of Unplanned Readmission:  15           Discharging to Facility/ Agency   Name: [facility]  Address: [address] [place] [address],[address] [place], [place]. [address]  Phone: [phone]  Fax: [phone]    Dialysis Facility (if applicable)   Name:  Address:  Dialysis Schedule:  Phone:  Fax:    / signature: Electronically signed by ARIANA Conway on 6/8/22 at 1:21 PM EDT    PHYSICIAN SECTION    Prognosis: Good    Condition at Discharge: Stable    Rehab Potential (if transferring to Rehab): Fair    Recommended Labs or Other Treatments After Discharge: None    Physician Certification: I certify the above information and transfer of Dalila Saldana  is necessary for the continuing treatment of the diagnosis listed and that she requires LTAC for greater 30 days.      Update Admission H&P: No change in H&P    PHYSICIAN SIGNATURE:  Electronically signed by Ghada Lawson DO on 6/13/22 at 8:16 AM EDT

## 2022-06-09 LAB
ANION GAP SERPL CALCULATED.3IONS-SCNC: 9 MEQ/L (ref 8–16)
BUN BLDV-MCNC: 18 MG/DL (ref 7–22)
CALCIUM SERPL-MCNC: 10.3 MG/DL (ref 8.5–10.5)
CHLORIDE BLD-SCNC: 102 MEQ/L (ref 98–111)
CO2: 27 MEQ/L (ref 23–33)
CREAT SERPL-MCNC: 0.5 MG/DL (ref 0.4–1.2)
ERYTHROCYTE [DISTWIDTH] IN BLOOD BY AUTOMATED COUNT: 12.3 % (ref 11.5–14.5)
ERYTHROCYTE [DISTWIDTH] IN BLOOD BY AUTOMATED COUNT: 48.3 FL (ref 35–45)
GFR SERPL CREATININE-BSD FRML MDRD: > 90 ML/MIN/1.73M2
GLUCOSE BLD-MCNC: 104 MG/DL (ref 70–108)
HCT VFR BLD CALC: 43.9 % (ref 37–47)
HEMOGLOBIN: 14.1 GM/DL (ref 12–16)
MCH RBC QN AUTO: 34.3 PG (ref 26–33)
MCHC RBC AUTO-ENTMCNC: 32.1 GM/DL (ref 32.2–35.5)
MCV RBC AUTO: 106.8 FL (ref 81–99)
PLATELET # BLD: 128 THOU/MM3 (ref 130–400)
PMV BLD AUTO: 11.1 FL (ref 9.4–12.4)
POTASSIUM REFLEX MAGNESIUM: 4.4 MEQ/L (ref 3.5–5.2)
RBC # BLD: 4.11 MILL/MM3 (ref 4.2–5.4)
SODIUM BLD-SCNC: 138 MEQ/L (ref 135–145)
WBC # BLD: 8 THOU/MM3 (ref 4.8–10.8)

## 2022-06-09 PROCEDURE — 99232 SBSQ HOSP IP/OBS MODERATE 35: CPT | Performed by: HOSPITALIST

## 2022-06-09 PROCEDURE — 6370000000 HC RX 637 (ALT 250 FOR IP): Performed by: STUDENT IN AN ORGANIZED HEALTH CARE EDUCATION/TRAINING PROGRAM

## 2022-06-09 PROCEDURE — 80048 BASIC METABOLIC PNL TOTAL CA: CPT

## 2022-06-09 PROCEDURE — 6370000000 HC RX 637 (ALT 250 FOR IP): Performed by: PHYSICIAN ASSISTANT

## 2022-06-09 PROCEDURE — 2060000000 HC ICU INTERMEDIATE R&B

## 2022-06-09 PROCEDURE — 6370000000 HC RX 637 (ALT 250 FOR IP): Performed by: SOCIAL WORKER

## 2022-06-09 PROCEDURE — 36415 COLL VENOUS BLD VENIPUNCTURE: CPT

## 2022-06-09 PROCEDURE — 85027 COMPLETE CBC AUTOMATED: CPT

## 2022-06-09 PROCEDURE — 2580000003 HC RX 258: Performed by: STUDENT IN AN ORGANIZED HEALTH CARE EDUCATION/TRAINING PROGRAM

## 2022-06-09 RX ORDER — CLOPIDOGREL BISULFATE 75 MG/1
75 TABLET ORAL DAILY
Status: DISCONTINUED | OUTPATIENT
Start: 2022-06-10 | End: 2022-06-10

## 2022-06-09 RX ORDER — CLOPIDOGREL BISULFATE 75 MG/1
300 TABLET ORAL ONCE
Status: COMPLETED | OUTPATIENT
Start: 2022-06-09 | End: 2022-06-09

## 2022-06-09 RX ADMIN — SODIUM CHLORIDE, PRESERVATIVE FREE 10 ML: 5 INJECTION INTRAVENOUS at 08:55

## 2022-06-09 RX ADMIN — CLOPIDOGREL BISULFATE 300 MG: 75 TABLET ORAL at 17:01

## 2022-06-09 RX ADMIN — LOSARTAN POTASSIUM 50 MG: 50 TABLET, FILM COATED ORAL at 08:54

## 2022-06-09 RX ADMIN — SENNOSIDES 8.6 MG: 8.6 TABLET, COATED ORAL at 08:54

## 2022-06-09 RX ADMIN — RISPERIDONE 0.5 MG: 0.25 TABLET ORAL at 08:54

## 2022-06-09 RX ADMIN — RISPERIDONE 0.5 MG: 0.25 TABLET ORAL at 21:01

## 2022-06-09 RX ADMIN — ATORVASTATIN CALCIUM 20 MG: 20 TABLET, FILM COATED ORAL at 21:01

## 2022-06-09 RX ADMIN — LEVETIRACETAM 1000 MG: 500 TABLET, FILM COATED ORAL at 21:01

## 2022-06-09 RX ADMIN — ASPIRIN 81 MG CHEWABLE TABLET 81 MG: 81 TABLET CHEWABLE at 08:54

## 2022-06-09 RX ADMIN — SODIUM CHLORIDE, PRESERVATIVE FREE 10 ML: 5 INJECTION INTRAVENOUS at 21:00

## 2022-06-09 RX ADMIN — LEVETIRACETAM 1000 MG: 500 TABLET, FILM COATED ORAL at 08:54

## 2022-06-09 RX ADMIN — CETIRIZINE HYDROCHLORIDE 10 MG: 10 TABLET, FILM COATED ORAL at 08:55

## 2022-06-09 RX ADMIN — SENNOSIDES 8.6 MG: 8.6 TABLET, COATED ORAL at 21:00

## 2022-06-09 ASSESSMENT — VISUAL ACUITY: VA_NORMAL: 1

## 2022-06-09 ASSESSMENT — PAIN SCALES - WONG BAKER: WONGBAKER_NUMERICALRESPONSE: 0

## 2022-06-09 NOTE — FLOWSHEET NOTE
Pt was in bed at the time of the visit. She was dealing with saccular aneurysm. She was not giving up but wanted prayer to heal. She said that she was handicapped and cannot walk. Words of encouragement and prayer was offered. 06/09/22 1332   Encounter Summary   Encounter Overview/Reason  Initial Encounter   Service Provided For: Patient   Referral/Consult From: [facility] Family members   Last Encounter  06/09/22   Complexity of Encounter Moderate   Begin Time 0845   End Time  0823   Total Time Calculated 1418 min   Spiritual/Emotional needs   Type Spiritual Support   Assessment/Intervention/Outcome   Assessment Calm; Hopeful   Intervention Active listening;Empowerment   Outcome Acceptance;Encouraged

## 2022-06-09 NOTE — PLAN OF CARE
A&Ox4, tremors bilateral upper extremities, purewick in place for urine incontinence. Call light within reach. Meds taken whole. Patient refused turn at 2000, able to turn at 2200 with perineal care.     Problem: Pain  Goal: Verbalizes/displays adequate comfort level or baseline comfort level  6/8/2022 2206 by Pam Lomax RN  Outcome: Progressing  6/8/2022 2205 by Pam Lomax RN  Outcome: Progressing     Problem: Safety - Adult  Goal: Free from fall injury  6/8/2022 2206 by Pam Lomax RN  Outcome: Progressing  6/8/2022 2205 by Pam Lomax RN  Outcome: Progressing     Problem: ABCDS Injury Assessment  Goal: Absence of physical injury  6/8/2022 2206 by Pam Lomax RN  Outcome: Progressing  6/8/2022 2205 by Pam Lomax RN  Outcome: Progressing

## 2022-06-09 NOTE — PROGRESS NOTES
Hospitalist Progress Note    Patient:  Derrick Gould      Unit/Bed:4A-12/012-A    YOB: 1960    MRN: [id number]       Acct: [de-identified]     PCP: No primary care provider on file. Date of Admission: 6/7/2022    Assessment/Plan:    1. Saccular Aneurysm of Right ICA: 1.4cm noted on CTA Head on 5/31/21, full report in Media. Apparently not significantly changed compared to their prior imaging. Patient is asymptomatic. Loaded with ASA and Plavix in the ED. Given another Plavix 300mg on 6/8 per Neurology. Continue ASA 81mg. Given Brilinta on 6/9 per Neuro since P2Y12 Assay is non-inhibitory. Plan for Cerebral Angiogram with possible flow diverter Friday. NPO after midnight. Maintain BP <130/80. 2. Hx Brain Aneurysms: s/p coiling and  shunt placement in 2019.   3. Elevated Troponin: Serial troponins have been flat. Suspect demand ischemia. Repeat EKG does not show any acute ischemic changes. Patient does not complain of CP. Will monitor for now. 4. HTN: Continue home meds. Hydralazine PRN to maintain BP <130/80. 5. Seizures: Continue Keppra 1000mg BID. Seizure, fall precautions. 6. HLD: Statin  7. Other: Discussed with PCP (Dr. Breanne Edgar) regarding patient being on Eliquis; he states she may have been on it in the past as DVT prophylaxis due to her prior ankle fracture. Discussed with Neurology who recommends to discontinue Eliquis as she is not indicated for it. No prior history of DVT/PE nor atrial fibrillation. Expected discharge date:  TBD    Disposition:    [] Home       [] TCU       [] Rehab       [] Psych       [] SNF       [x] [address]  [facility]       [] Other-    Chief Complaint: Abnormal CTA [facility] Course:    Patient is a 59 y/o Female with PMH Essential HTN, HLD, Seizures, Brain Aneurysms s/p  Shunt Placement in 2019.  She presents to [facility] ED after having outpatient imaging that reported a 1.4cm saccular aneurysm along the anterolateral aspect of the cavernous right internal carotid artery projecting anteriorly towards the right orbital apex although unchanged compared to prior on 5/31/21. She states that she was advised by her Neurologist Dr. Zapata Overall to get admitted for further evaluation and management. Neurology was notified by the ED Provider who spoke with Dr. Oneida Lopez who recommended her start on Aspirin and Plavix 300mg load with plan for possible coiling on Friday. Patient herself states she feels well and has no complaints at this time. She denies any dizziness, lightheadedness, chest pain, palpitations, shortness of breath, abdominal pain, N/V/D. Subjective (past 24 hours): No significant events overnight. Patient is doing well and has no new focal neurological deficits. She denies any headaches, dizziness, lightheadedness. Tolerating breakfast. She has no questions or concerns at this time. ROS (12 point review of systems completed. Pertinent positives noted. Otherwise ROS is negative). Medications:  Reviewed    Infusion Medications    sodium chloride       Scheduled Medications    ticagrelor  180 mg Oral Once    ticagrelor  90 mg Oral BID    sodium chloride flush  5-40 mL IntraVENous 2 times per day    atorvastatin  20 mg Oral Nightly    levETIRAcetam  1,000 mg Oral BID    risperiDONE  0.5 mg Oral BID    senna  1 tablet Oral BID    amLODIPine  5 mg Oral Daily    cetirizine  10 mg Oral Daily    losartan  50 mg Oral Daily    aspirin  81 mg Oral Daily     PRN Meds: hydrALAZINE, sodium chloride flush, sodium chloride, ondansetron **OR** ondansetron, polyethylene glycol, acetaminophen **OR** acetaminophen, simethicone      Intake/Output Summary (Last 24 hours) at 6/9/2022 1100  Last data filed at 6/9/2022 0549  Gross per 24 hour   Intake 490 ml   Output 3025 ml   Net -2535 ml     Diet:  ADULT DIET;  Regular; Low Fat/Low Chol/High Fiber/2 gm Na  Diet NPO    Exam:  BP (!) 125/53   Pulse 60   Temp 97.3 °F (36.3 °C)   Resp 18   Ht 5' 5\" (1.651 m)   Wt (!) 329 lb 12.9 oz (149.6 kg)   SpO2 92%   BMI 54.88 kg/m²     General appearance: Morbidly obese, bedbound female. Cooperative, NAD. HEENT: Pupils equal, round, and reactive to light. Conjunctivae/corneas clear. Neck: Supple, with full range of motion. No jugular venous distention. Trachea midline. Respiratory:  Normal respiratory effort. Clear to auscultation, bilaterally without Rales/Wheezes/Rhonchi. Cardiovascular: Regular rate and rhythm with normal S1/S2 without murmurs, rubs or gallops. Abdomen: Soft, non-tender, non-distended with normal bowel sounds. Musculoskeletal: passive and active ROM x 4 extremities with decreased strength in all extremities. Intermittent resting tremors to RUE. Skin: Skin color, texture, turgor normal.  No rashes or lesions. Neurologic:  Neurovascularly intact without any focal sensory/motor deficits. Cranial nerves: II-XII intact, grossly non-focal.  Psychiatric: Alert and oriented, thought content appropriate, normal insight  Capillary Refill: Brisk,< 3 seconds   Peripheral Pulses: +2 palpable, equal bilaterally     Labs:   Recent Labs     06/07/22  1750 06/08/22  0716 06/09/22  0506   WBC 7.3 6.6 8.0   HGB 14.1 14.3 14.1   HCT 43.1 42.5 43.9   * 113* 128*     Recent Labs     06/07/22  1750 06/08/22  0812 06/09/22  0506    141 138   K 4.5 4.3 4.4    103 102   CO2 23 27 27   BUN 18 16 18   CREATININE 0.7 0.6 0.5   CALCIUM 10.6* 10.0 10.3     No results for input(s): AST, ALT, BILIDIR, BILITOT, ALKPHOS in the last 72 hours. Recent Labs     06/07/22  1750   INR 1.23*     No results for input(s): Enoch Camara in the last 72 hours.     Microbiology:      Urinalysis:      Lab Results   Component Value Date    NITRU NEGATIVE 03/17/2021    WBCUA 10-15 03/17/2021    BACTERIA MANY 03/17/2021    RBCUA 3-5 03/17/2021    BLOODU NEGATIVE 03/17/2021    GLUCOSEU NEGATIVE 03/17/2021     Radiology:  No orders to display     DVT prophylaxis: [] Lovenox                                 [x] SCDs                                 [] SQ Heparin                                 [] Encourage ambulation           [] Already on Anticoagulation     Code Status: Full Code    PT/OT Eval Status: N/A    Tele:   [x] yes             [] no    Electronically signed by Link Steen DO on 6/9/2022 at 11:00 AM

## 2022-06-09 NOTE — PROGRESS NOTES
Neurology Progress Note    Date:6/9/2022       YXUT:[id number]  Patient Name:Domonique Gil     YOB: 1960     Age:62 y.o. 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 Pt residing at a nursing facility since 2019 after her aneurysmal rupture. Patient reports that she has daily headaches, speech difficulty and lower extremity sensory change that are chronic. She also reports tremors that are a recent development but have been attributed to medication side effect by her outpatient neurologist, med to be weaned by psychiatry. Pt denies syncope, unilateral weakness, vision changes. Interval history 6/8/22: The patient is not having any new neurologic symptoms today. Interval history 6/9/22: The patient is relatively the same today. She has not been resting well. Her P2Y12 assay did not show adequate inhibition. Review of Systems   Review of Systems   Eyes: Negative for visual disturbance. Neurological: Negative for dizziness, facial asymmetry, speech difficulty, weakness, light-headedness, numbness and headaches.      Medications   Scheduled Meds:    ticagrelor  180 mg Oral Once    ticagrelor  90 mg Oral BID    sodium chloride flush  5-40 mL IntraVENous 2 times per day    atorvastatin  20 mg Oral Nightly    levETIRAcetam  1,000 mg Oral BID    risperiDONE  0.5 mg Oral BID    senna  1 tablet Oral BID    amLODIPine  5 mg Oral Daily    cetirizine  10 mg Oral Daily    losartan  50 mg Oral Daily    aspirin  81 mg Oral Daily     Continuous Infusions:    sodium chloride       PRN Meds: hydrALAZINE, sodium chloride flush, sodium chloride, ondansetron **OR** ondansetron, polyethylene glycol, acetaminophen **OR** acetaminophen, simethicone    Past History    Past Medical History:   has a past medical history of Brain aneurysm, Hypertension, and Seizures ([address] Social History:   reports that she quit smoking about 6 years ago. She has never used smokeless tobacco. She reports that she does not drink alcohol and does not use drugs. Family History:   Family History   Problem Relation Age of Onset   [employer] Cancer Mother         Breast, Lung    Diabetes Father     Cancer Father         colon    Alzheimer's Disease Father     ADHD Father        Physical Examination      Vitals:  BP (!) 125/53   Pulse 60   Temp 97.3 °F (36.3 °C)   Resp 18   Ht 5' 5\" (1.651 m)   Wt (!) 329 lb 12.9 oz (149.6 kg)   SpO2 92%   BMI 54.88 kg/m²   Temp (24hrs), Av.8 °F (36.6 °C), Min:97.3 °F (36.3 °C), Max:98.2 °F (36.8 °C)      I/O (24Hr): Intake/Output Summary (Last 24 hours) at 2022 1009  Last data filed at 2022 0549  Gross per 24 hour   Intake 490 ml   Output 3025 ml   Net -2535 ml       Physical Exam  Constitutional:       General: She is not in acute distress. Appearance: She is obese. HENT:      Head: Normocephalic and atraumatic. Right Ear: External ear normal.      Left Ear: External ear normal.      Nose: Nose normal.      Mouth/Throat:      Mouth: Mucous membranes are moist.      Pharynx: Oropharynx is clear. Eyes:      General: No scleral icterus. Extraocular Movements: Extraocular movements intact and EOM normal.      Pupils: Pupils are equal, round, and reactive to light. Cardiovascular:      Rate and Rhythm: Normal rate and regular rhythm. Heart sounds: No murmur heard. No friction rub. No gallop. Pulmonary:      Effort: Pulmonary effort is normal. No respiratory distress.       Breath sounds: Normal breath sounds. Abdominal:      General: Abdomen is flat. There is no distension. Palpations: Abdomen is soft. Tenderness: There is no abdominal tenderness. Musculoskeletal:         General: No deformity or signs of injury. Skin:     General: Skin is warm and dry. Neurological:      Mental Status: She is alert and oriented to person, place, and time. Psychiatric:         Mood and Affect: Mood normal.         Speech: Speech normal.         Behavior: Behavior normal.       Neurologic Exam     Mental Status   Oriented to person, place, and time. Attention: normal. Concentration: normal.   Speech: speech is normal   Level of consciousness: alert  Normal comprehension. Cranial Nerves     CN II   Visual fields full to confrontation. Visual acuity: normal    CN III, IV, VI   Pupils are equal, round, and reactive to light. Extraocular motions are normal.   Right pupil: Size: 3 mm. Shape: regular. Reactivity: brisk. Left pupil: Size: 3 mm. Shape: regular. Reactivity: brisk. CN V   Facial sensation intact. Right facial sensation deficit: none  Left facial sensation deficit: none    CN VII   Facial expression full, symmetric.    Right facial weakness: none  Left facial weakness: none    CN VIII   CN VIII normal.   Hearing: intact    CN IX, X   CN IX normal.   Palate: symmetric    CN XI   CN XI normal.   Right trapezius strength: normal  Left trapezius strength: normal    CN XII   CN XII normal.   Tongue: not atrophic  Fasciculations: absent  Tongue deviation: none    Motor Exam   Muscle bulk: normal  Overall muscle tone: normal  Right arm pronator drift: absent  Left arm pronator drift: absent  Strength:  BUE: 4/5  BLE: 2/5     Sensory Exam   Right arm light touch: normal  Left arm light touch: normal  Right leg light touch: decreased from ankle  Left leg light touch: decreased from ankle (worse on the left)    Gait, Coordination, and Reflexes     Tremor   Resting tremor: present     Labs/Imaging/Diagnostics   Labs:  CBC:  Recent Labs     06/07/22  1750 06/08/22  0716 06/09/22  0506   WBC 7.3 6.6 8.0   RBC 4.08* 4.08* 4.11*   HGB 14.1 14.3 14.1   HCT 43.1 42.5 43.9   .6* 104.2* 106.8*   * 113* 128*     CHEMISTRIES:  Recent Labs     06/07/22  1750 06/08/22  0812 06/09/22  0506    141 138   K 4.5 4.3 4.4    103 102   CO2 23 27 27   BUN 18 16 18   CREATININE 0.7 0.6 0.5   GLUCOSE 90 171* 104     PT/INR:  Recent Labs     06/07/22  1750   INR 1.23*     APTT:  Recent Labs     06/07/22  1750   APTT 32.0     LIVER PROFILE:No results for input(s): AST, ALT, BILIDIR, BILITOT, ALKPHOS in the last 72 hours. Imaging Last 24 Hours:  No results found. Assessment and Plan:          1. 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 Also demonstrates anterior communicating artery coils/artifact. · Continue ASA 81mg daily  · Give additional 300mg of Plavix tonight (6/9/2022) and repeat P2Y12 assay tomorrow at 0100. Assuming appropriate inhibition, will proceed with treatment of aneurysm. The patient is currently scheduled for embolization of her aneurysm with anesthesia at 9am on 6/10/2022. · Written informed consent will be obtained from the patient's brother who is her medical power of . · VerifyNow/P2Y12 platelet inhibition test on 6/8/2022 did not show adequate inhibition  · Maintain BP <130/80  · Neurology following    This case was discussed with Dr. Jazmine Mosher and he is in agreement with the assessment and plan.     Electronically signed by Conrado Garcia PA-C on 6/9/22 at 5:10 PM EDT

## 2022-06-10 ENCOUNTER — APPOINTMENT (OUTPATIENT)
Dept: GENERAL RADIOLOGY | Age: 62
DRG: 270 | End: 2022-06-10
Payer: MEDICARE

## 2022-06-10 ENCOUNTER — ANESTHESIA (OUTPATIENT)
Dept: CARDIAC CATH/INVASIVE PROCEDURES | Age: 62
DRG: 270 | End: 2022-06-10
Payer: MEDICARE

## 2022-06-10 ENCOUNTER — ANESTHESIA EVENT (OUTPATIENT)
Dept: CARDIAC CATH/INVASIVE PROCEDURES | Age: 62
DRG: 270 | End: 2022-06-10
Payer: MEDICARE

## 2022-06-10 ENCOUNTER — APPOINTMENT (OUTPATIENT)
Dept: CARDIAC CATH/INVASIVE PROCEDURES | Age: 62
DRG: 270 | End: 2022-06-10
Payer: MEDICARE

## 2022-06-10 ENCOUNTER — APPOINTMENT (OUTPATIENT)
Dept: INTERVENTIONAL RADIOLOGY/VASCULAR | Age: 62
DRG: 270 | End: 2022-06-10
Payer: MEDICARE

## 2022-06-10 LAB
ABO: NORMAL
ACTIVATED CLOTTING TIME: 237 SECONDS (ref 1–150)
ANION GAP SERPL CALCULATED.3IONS-SCNC: 10 MEQ/L (ref 8–16)
ANION GAP SERPL CALCULATED.3IONS-SCNC: 8 MEQ/L (ref 8–16)
ANTIBODY SCREEN: NORMAL
APTT: 30.1 SECONDS (ref 22–38)
BUN BLDV-MCNC: 21 MG/DL (ref 7–22)
BUN BLDV-MCNC: 21 MG/DL (ref 7–22)
CALCIUM SERPL-MCNC: 10 MG/DL (ref 8.5–10.5)
CALCIUM SERPL-MCNC: 10.1 MG/DL (ref 8.5–10.5)
CHLORIDE BLD-SCNC: 101 MEQ/L (ref 98–111)
CHLORIDE BLD-SCNC: 102 MEQ/L (ref 98–111)
CO2: 28 MEQ/L (ref 23–33)
CO2: 28 MEQ/L (ref 23–33)
CREAT SERPL-MCNC: 0.6 MG/DL (ref 0.4–1.2)
CREAT SERPL-MCNC: 0.7 MG/DL (ref 0.4–1.2)
EKG ATRIAL RATE: 84 BPM
EKG P AXIS: 71 DEGREES
EKG P-R INTERVAL: 136 MS
EKG Q-T INTERVAL: 408 MS
EKG QRS DURATION: 94 MS
EKG QTC CALCULATION (BAZETT): 482 MS
EKG R AXIS: 34 DEGREES
EKG T AXIS: 51 DEGREES
EKG VENTRICULAR RATE: 84 BPM
ERYTHROCYTE [DISTWIDTH] IN BLOOD BY AUTOMATED COUNT: 12.2 % (ref 11.5–14.5)
ERYTHROCYTE [DISTWIDTH] IN BLOOD BY AUTOMATED COUNT: 46.7 FL (ref 35–45)
GFR SERPL CREATININE-BSD FRML MDRD: 85 ML/MIN/1.73M2
GFR SERPL CREATININE-BSD FRML MDRD: > 90 ML/MIN/1.73M2
GLUCOSE BLD-MCNC: 102 MG/DL (ref 70–108)
GLUCOSE BLD-MCNC: 104 MG/DL (ref 70–108)
HCT VFR BLD CALC: 42.1 % (ref 37–47)
HEMOGLOBIN: 13.8 GM/DL (ref 12–16)
INR BLD: 1.13 (ref 0.85–1.13)
MCH RBC QN AUTO: 34.2 PG (ref 26–33)
MCHC RBC AUTO-ENTMCNC: 32.8 GM/DL (ref 32.2–35.5)
MCV RBC AUTO: 104.5 FL (ref 81–99)
P2Y12 ASSAY: 188 PRU (ref 180–376)
PLATELET # BLD: 124 THOU/MM3 (ref 130–400)
PMV BLD AUTO: 10.9 FL (ref 9.4–12.4)
POTASSIUM REFLEX MAGNESIUM: 4.1 MEQ/L (ref 3.5–5.2)
POTASSIUM SERPL-SCNC: 4.3 MEQ/L (ref 3.5–5.2)
RBC # BLD: 4.03 MILL/MM3 (ref 4.2–5.4)
RH FACTOR: NORMAL
SODIUM BLD-SCNC: 137 MEQ/L (ref 135–145)
SODIUM BLD-SCNC: 140 MEQ/L (ref 135–145)
WBC # BLD: 8.5 THOU/MM3 (ref 4.8–10.8)

## 2022-06-10 PROCEDURE — 36224 PLACE CATH CAROTD ART: CPT | Performed by: PSYCHIATRY & NEUROLOGY

## 2022-06-10 PROCEDURE — B3181ZZ FLUOROSCOPY OF BILATERAL INTERNAL CAROTID ARTERIES USING LOW OSMOLAR CONTRAST: ICD-10-PCS | Performed by: PSYCHIATRY & NEUROLOGY

## 2022-06-10 PROCEDURE — B31G1ZZ FLUOROSCOPY OF BILATERAL VERTEBRAL ARTERIES USING LOW OSMOLAR CONTRAST: ICD-10-PCS | Performed by: PSYCHIATRY & NEUROLOGY

## 2022-06-10 PROCEDURE — 86850 RBC ANTIBODY SCREEN: CPT

## 2022-06-10 PROCEDURE — 6370000000 HC RX 637 (ALT 250 FOR IP): Performed by: SOCIAL WORKER

## 2022-06-10 PROCEDURE — 75894 X-RAYS TRANSCATH THERAPY: CPT | Performed by: PSYCHIATRY & NEUROLOGY

## 2022-06-10 PROCEDURE — C1766 INTRO/SHEATH,STRBLE,NON-PEEL: HCPCS

## 2022-06-10 PROCEDURE — 2580000003 HC RX 258: Performed by: PHYSICIAN ASSISTANT

## 2022-06-10 PROCEDURE — C1760 CLOSURE DEV, VASC: HCPCS

## 2022-06-10 PROCEDURE — 75894 X-RAYS TRANSCATH THERAPY: CPT

## 2022-06-10 PROCEDURE — 71045 X-RAY EXAM CHEST 1 VIEW: CPT

## 2022-06-10 PROCEDURE — C1769 GUIDE WIRE: HCPCS

## 2022-06-10 PROCEDURE — 36226 PLACE CATH VERTEBRAL ART: CPT | Performed by: PSYCHIATRY & NEUROLOGY

## 2022-06-10 PROCEDURE — 2500000003 HC RX 250 WO HCPCS: Performed by: NURSE ANESTHETIST, CERTIFIED REGISTERED

## 2022-06-10 PROCEDURE — 6360000004 HC RX CONTRAST MEDICATION: Performed by: PSYCHIATRY & NEUROLOGY

## 2022-06-10 PROCEDURE — 93005 ELECTROCARDIOGRAM TRACING: CPT | Performed by: SOCIAL WORKER

## 2022-06-10 PROCEDURE — 36217 PLACE CATHETER IN ARTERY: CPT

## 2022-06-10 PROCEDURE — 6370000000 HC RX 637 (ALT 250 FOR IP): Performed by: STUDENT IN AN ORGANIZED HEALTH CARE EDUCATION/TRAINING PROGRAM

## 2022-06-10 PROCEDURE — C1887 CATHETER, GUIDING: HCPCS

## 2022-06-10 PROCEDURE — 80048 BASIC METABOLIC PNL TOTAL CA: CPT

## 2022-06-10 PROCEDURE — 03VK3DZ RESTRICTION OF RIGHT INTERNAL CAROTID ARTERY WITH INTRALUMINAL DEVICE, PERCUTANEOUS APPROACH: ICD-10-PCS | Performed by: PSYCHIATRY & NEUROLOGY

## 2022-06-10 PROCEDURE — 93010 ELECTROCARDIOGRAM REPORT: CPT | Performed by: INTERNAL MEDICINE

## 2022-06-10 PROCEDURE — 03VK3HZ RESTRICTION OF RIGHT INTERNAL CAROTID ARTERY WITH INTRALUMINAL DEVICE, FLOW DIVERTER, PERCUTANEOUS APPROACH: ICD-10-PCS | Performed by: PSYCHIATRY & NEUROLOGY

## 2022-06-10 PROCEDURE — 36217 PLACE CATHETER IN ARTERY: CPT | Performed by: PSYCHIATRY & NEUROLOGY

## 2022-06-10 PROCEDURE — C1876 STENT, NON-COA/NON-COV W/DEL: HCPCS

## 2022-06-10 PROCEDURE — 85576 BLOOD PLATELET AGGREGATION: CPT

## 2022-06-10 PROCEDURE — 3700000000 HC ANESTHESIA ATTENDED CARE

## 2022-06-10 PROCEDURE — 85347 COAGULATION TIME ACTIVATED: CPT

## 2022-06-10 PROCEDURE — 75898 FOLLOW-UP ANGIOGRAPHY: CPT | Performed by: PSYCHIATRY & NEUROLOGY

## 2022-06-10 PROCEDURE — 86900 BLOOD TYPING SEROLOGIC ABO: CPT

## 2022-06-10 PROCEDURE — 99222 1ST HOSP IP/OBS MODERATE 55: CPT | Performed by: INTERNAL MEDICINE

## 2022-06-10 PROCEDURE — 2580000003 HC RX 258: Performed by: SOCIAL WORKER

## 2022-06-10 PROCEDURE — 61624 TCAT PERM OCCLS/EMBOLJ CNS: CPT | Performed by: PSYCHIATRY & NEUROLOGY

## 2022-06-10 PROCEDURE — 6360000002 HC RX W HCPCS: Performed by: NURSE ANESTHETIST, CERTIFIED REGISTERED

## 2022-06-10 PROCEDURE — 36216 PLACE CATHETER IN ARTERY: CPT

## 2022-06-10 PROCEDURE — C1889 IMPLANT/INSERT DEVICE, NOC: HCPCS

## 2022-06-10 PROCEDURE — 2100000000 HC CCU R&B

## 2022-06-10 PROCEDURE — 85027 COMPLETE CBC AUTOMATED: CPT

## 2022-06-10 PROCEDURE — 2720000010 HC SURG SUPPLY STERILE

## 2022-06-10 PROCEDURE — 61624 TCAT PERM OCCLS/EMBOLJ CNS: CPT

## 2022-06-10 PROCEDURE — 99232 SBSQ HOSP IP/OBS MODERATE 35: CPT | Performed by: HOSPITALIST

## 2022-06-10 PROCEDURE — 6370000000 HC RX 637 (ALT 250 FOR IP): Performed by: PSYCHIATRY & NEUROLOGY

## 2022-06-10 PROCEDURE — 86901 BLOOD TYPING SEROLOGIC RH(D): CPT

## 2022-06-10 PROCEDURE — B41G1ZZ FLUOROSCOPY OF LEFT LOWER EXTREMITY ARTERIES USING LOW OSMOLAR CONTRAST: ICD-10-PCS | Performed by: PSYCHIATRY & NEUROLOGY

## 2022-06-10 PROCEDURE — 37216 TRANSCATH STENT CCA W/O EPS: CPT

## 2022-06-10 PROCEDURE — 3700000001 HC ADD 15 MINUTES (ANESTHESIA)

## 2022-06-10 PROCEDURE — 85730 THROMBOPLASTIN TIME PARTIAL: CPT

## 2022-06-10 PROCEDURE — 85610 PROTHROMBIN TIME: CPT

## 2022-06-10 PROCEDURE — 36415 COLL VENOUS BLD VENIPUNCTURE: CPT

## 2022-06-10 PROCEDURE — C1725 CATH, TRANSLUMIN NON-LASER: HCPCS

## 2022-06-10 RX ORDER — ONDANSETRON 2 MG/ML
INJECTION INTRAMUSCULAR; INTRAVENOUS PRN
Status: DISCONTINUED | OUTPATIENT
Start: 2022-06-10 | End: 2022-06-10 | Stop reason: SDUPTHER

## 2022-06-10 RX ORDER — SODIUM CHLORIDE 9 MG/ML
INJECTION, SOLUTION INTRAVENOUS PRN
Status: DISCONTINUED | OUTPATIENT
Start: 2022-06-10 | End: 2022-06-13 | Stop reason: HOSPADM

## 2022-06-10 RX ORDER — SODIUM CHLORIDE 9 MG/ML
INJECTION, SOLUTION INTRAVENOUS PRN
Status: DISCONTINUED | OUTPATIENT
Start: 2022-06-10 | End: 2022-06-10 | Stop reason: SDUPTHER

## 2022-06-10 RX ORDER — PROPOFOL 10 MG/ML
INJECTION, EMULSION INTRAVENOUS PRN
Status: DISCONTINUED | OUTPATIENT
Start: 2022-06-10 | End: 2022-06-10 | Stop reason: SDUPTHER

## 2022-06-10 RX ORDER — SODIUM CHLORIDE 0.9 % (FLUSH) 0.9 %
5-40 SYRINGE (ML) INJECTION PRN
Status: DISCONTINUED | OUTPATIENT
Start: 2022-06-10 | End: 2022-06-10 | Stop reason: SDUPTHER

## 2022-06-10 RX ORDER — HYDRALAZINE HYDROCHLORIDE 20 MG/ML
INJECTION INTRAMUSCULAR; INTRAVENOUS PRN
Status: DISCONTINUED | OUTPATIENT
Start: 2022-06-10 | End: 2022-06-10 | Stop reason: SDUPTHER

## 2022-06-10 RX ORDER — ROCURONIUM BROMIDE 10 MG/ML
INJECTION, SOLUTION INTRAVENOUS PRN
Status: DISCONTINUED | OUTPATIENT
Start: 2022-06-10 | End: 2022-06-10 | Stop reason: SDUPTHER

## 2022-06-10 RX ORDER — LIDOCAINE HYDROCHLORIDE 15 MG/ML
INJECTION, SOLUTION EPIDURAL; INFILTRATION; INTRACAUDAL; PERINEURAL PRN
Status: DISCONTINUED | OUTPATIENT
Start: 2022-06-10 | End: 2022-06-10

## 2022-06-10 RX ORDER — LIDOCAINE HYDROCHLORIDE 20 MG/ML
INJECTION, SOLUTION INTRAVENOUS PRN
Status: DISCONTINUED | OUTPATIENT
Start: 2022-06-10 | End: 2022-06-10 | Stop reason: SDUPTHER

## 2022-06-10 RX ORDER — SODIUM CHLORIDE 0.9 % (FLUSH) 0.9 %
5-40 SYRINGE (ML) INJECTION EVERY 12 HOURS SCHEDULED
Status: DISCONTINUED | OUTPATIENT
Start: 2022-06-10 | End: 2022-06-10 | Stop reason: SDUPTHER

## 2022-06-10 RX ORDER — SODIUM CHLORIDE 0.9 % (FLUSH) 0.9 %
5-40 SYRINGE (ML) INJECTION PRN
Status: DISCONTINUED | OUTPATIENT
Start: 2022-06-10 | End: 2022-06-13 | Stop reason: HOSPADM

## 2022-06-10 RX ORDER — HEPARIN SODIUM 1000 [USP'U]/ML
INJECTION, SOLUTION INTRAVENOUS; SUBCUTANEOUS PRN
Status: DISCONTINUED | OUTPATIENT
Start: 2022-06-10 | End: 2022-06-10 | Stop reason: SDUPTHER

## 2022-06-10 RX ORDER — GLYCOPYRROLATE 1 MG/5 ML
SYRINGE (ML) INTRAVENOUS PRN
Status: DISCONTINUED | OUTPATIENT
Start: 2022-06-10 | End: 2022-06-10 | Stop reason: SDUPTHER

## 2022-06-10 RX ORDER — DEXAMETHASONE SODIUM PHOSPHATE 10 MG/ML
INJECTION, EMULSION INTRAMUSCULAR; INTRAVENOUS PRN
Status: DISCONTINUED | OUTPATIENT
Start: 2022-06-10 | End: 2022-06-10 | Stop reason: SDUPTHER

## 2022-06-10 RX ORDER — SODIUM CHLORIDE 0.9 % (FLUSH) 0.9 %
5-40 SYRINGE (ML) INJECTION EVERY 12 HOURS SCHEDULED
Status: DISCONTINUED | OUTPATIENT
Start: 2022-06-10 | End: 2022-06-13 | Stop reason: HOSPADM

## 2022-06-10 RX ORDER — FENTANYL CITRATE 50 UG/ML
INJECTION, SOLUTION INTRAMUSCULAR; INTRAVENOUS PRN
Status: DISCONTINUED | OUTPATIENT
Start: 2022-06-10 | End: 2022-06-10 | Stop reason: SDUPTHER

## 2022-06-10 RX ORDER — ACETAMINOPHEN 325 MG/1
650 TABLET ORAL EVERY 4 HOURS PRN
Status: DISCONTINUED | OUTPATIENT
Start: 2022-06-10 | End: 2022-06-13 | Stop reason: HOSPADM

## 2022-06-10 RX ADMIN — Medication 0.4 MG: at 11:38

## 2022-06-10 RX ADMIN — HEPARIN SODIUM 5000 UNITS: 1000 INJECTION INTRAVENOUS; SUBCUTANEOUS at 10:34

## 2022-06-10 RX ADMIN — Medication 0.2 MG: at 10:35

## 2022-06-10 RX ADMIN — HYDRALAZINE HYDROCHLORIDE 5 MG: 20 INJECTION INTRAMUSCULAR; INTRAVENOUS at 12:08

## 2022-06-10 RX ADMIN — Medication 0.2 MG: at 10:29

## 2022-06-10 RX ADMIN — RISPERIDONE 0.5 MG: 0.25 TABLET ORAL at 21:52

## 2022-06-10 RX ADMIN — DEXAMETHASONE SODIUM PHOSPHATE 8 MG: 10 INJECTION, EMULSION INTRAMUSCULAR; INTRAVENOUS at 10:00

## 2022-06-10 RX ADMIN — TICAGRELOR 90 MG: 90 TABLET ORAL at 21:52

## 2022-06-10 RX ADMIN — LOSARTAN POTASSIUM 50 MG: 50 TABLET, FILM COATED ORAL at 07:37

## 2022-06-10 RX ADMIN — SODIUM CHLORIDE: 9 INJECTION, SOLUTION INTRAVENOUS at 09:39

## 2022-06-10 RX ADMIN — SENNOSIDES 8.6 MG: 8.6 TABLET, COATED ORAL at 21:52

## 2022-06-10 RX ADMIN — FENTANYL CITRATE 50 MCG: 50 INJECTION, SOLUTION INTRAMUSCULAR; INTRAVENOUS at 10:12

## 2022-06-10 RX ADMIN — SODIUM CHLORIDE, PRESERVATIVE FREE 10 ML: 5 INJECTION INTRAVENOUS at 23:31

## 2022-06-10 RX ADMIN — TICAGRELOR 180 MG: 90 TABLET ORAL at 07:38

## 2022-06-10 RX ADMIN — CETIRIZINE HYDROCHLORIDE 10 MG: 10 TABLET, FILM COATED ORAL at 07:38

## 2022-06-10 RX ADMIN — HYDRALAZINE HYDROCHLORIDE 10 MG: 20 INJECTION INTRAMUSCULAR; INTRAVENOUS at 12:01

## 2022-06-10 RX ADMIN — Medication 0.2 MG: at 10:20

## 2022-06-10 RX ADMIN — LEVETIRACETAM 1000 MG: 500 TABLET, FILM COATED ORAL at 07:37

## 2022-06-10 RX ADMIN — LIDOCAINE HYDROCHLORIDE 100 MG: 20 INJECTION, SOLUTION INTRAVENOUS at 09:41

## 2022-06-10 RX ADMIN — ACETAMINOPHEN 650 MG: 325 TABLET ORAL at 17:39

## 2022-06-10 RX ADMIN — IOPAMIDOL 80 ML: 612 INJECTION, SOLUTION INTRAVENOUS at 12:10

## 2022-06-10 RX ADMIN — ROCURONIUM BROMIDE 50 MG: 50 INJECTION, SOLUTION INTRAVENOUS at 10:13

## 2022-06-10 RX ADMIN — Medication 0.2 MG: at 10:13

## 2022-06-10 RX ADMIN — SODIUM CHLORIDE, PRESERVATIVE FREE 10 ML: 5 INJECTION INTRAVENOUS at 07:39

## 2022-06-10 RX ADMIN — ASPIRIN 81 MG CHEWABLE TABLET 81 MG: 81 TABLET CHEWABLE at 07:38

## 2022-06-10 RX ADMIN — ONDANSETRON 4 MG: 2 INJECTION INTRAMUSCULAR; INTRAVENOUS at 10:00

## 2022-06-10 RX ADMIN — PROPOFOL 140 MG: 10 INJECTION, EMULSION INTRAVENOUS at 09:41

## 2022-06-10 RX ADMIN — AMLODIPINE BESYLATE 5 MG: 5 TABLET ORAL at 07:38

## 2022-06-10 RX ADMIN — ROCURONIUM BROMIDE 20 MG: 50 INJECTION, SOLUTION INTRAVENOUS at 11:09

## 2022-06-10 RX ADMIN — Medication 0.2 MG: at 09:56

## 2022-06-10 RX ADMIN — LEVETIRACETAM 1000 MG: 500 TABLET, FILM COATED ORAL at 21:52

## 2022-06-10 RX ADMIN — ROCURONIUM BROMIDE 50 MG: 50 INJECTION, SOLUTION INTRAVENOUS at 09:41

## 2022-06-10 RX ADMIN — HEPARIN SODIUM 1000 UNITS: 1000 INJECTION INTRAVENOUS; SUBCUTANEOUS at 11:01

## 2022-06-10 RX ADMIN — RISPERIDONE 0.5 MG: 0.25 TABLET ORAL at 07:38

## 2022-06-10 RX ADMIN — HYDRALAZINE HYDROCHLORIDE 5 MG: 20 INJECTION INTRAMUSCULAR; INTRAVENOUS at 12:10

## 2022-06-10 RX ADMIN — SUGAMMADEX 200 MG: 100 INJECTION, SOLUTION INTRAVENOUS at 12:05

## 2022-06-10 RX ADMIN — ATORVASTATIN CALCIUM 20 MG: 20 TABLET, FILM COATED ORAL at 21:52

## 2022-06-10 ASSESSMENT — PAIN SCALES - WONG BAKER
WONGBAKER_NUMERICALRESPONSE: 0

## 2022-06-10 ASSESSMENT — PAIN DESCRIPTION - ORIENTATION: ORIENTATION: MID

## 2022-06-10 ASSESSMENT — ENCOUNTER SYMPTOMS
PHOTOPHOBIA: 0
SORE THROAT: 0
NAUSEA: 0
VOICE CHANGE: 0
CHEST TIGHTNESS: 0
COLOR CHANGE: 0
BACK PAIN: 0
ABDOMINAL PAIN: 0
WHEEZING: 0
TROUBLE SWALLOWING: 0
VOMITING: 0
SHORTNESS OF BREATH: 0

## 2022-06-10 ASSESSMENT — PAIN SCALES - GENERAL
PAINLEVEL_OUTOF10: 4
PAINLEVEL_OUTOF10: 0
PAINLEVEL_OUTOF10: 0

## 2022-06-10 ASSESSMENT — PAIN DESCRIPTION - DESCRIPTORS: DESCRIPTORS: ACHING

## 2022-06-10 ASSESSMENT — VISUAL ACUITY: VA_NORMAL: 1

## 2022-06-10 ASSESSMENT — PAIN DESCRIPTION - LOCATION: LOCATION: GENERALIZED

## 2022-06-10 NOTE — CARE COORDINATION
6/10/22, 10:51 AM EDT    DISCHARGE PLANNING EVALUATION    Received a call from [facility], asking for an update on patient. Made her aware that patient was having a procedure for cerebral angiogram with possible flow diverter for aneurysm today and then transferring to ICU afterwards. Will talk Monday and update her on patient's condition then.

## 2022-06-10 NOTE — PROCEDURES
PREOPERATIVE DIAGNOSIS: Intracranial aneurysm    POSTOPERATIVE DIAGNOSIS: Same    SURGEON: Cesar Winter MD    ANESTHESIA: General endotracheal anesthesia managed by the anesthesiology team    MEDICATIONS: Heparin 4000 units/L for saline flushes. ESTIMATED BLOOD LOSS: 40 cc    COMPLICATIONS: None    HEMOSTASIS: 8 Gabonese angioseal closure device    PROCEDURES PERFORMED:  1. Four vessels diagnostic cerebral angiogram  2. Right internal carotid artery cavernous segment aneurysm embolization using surpass evolve flow diverter  3. Right femoral artery angioseal closure    CATHETERIZATION FOLLOWING VESSELS:    1. Right internal carotid artery    2. Left internal carotid artery    3. Right vertebral artery    4. Left vertebral artery    ANGIOGRAPHY AND INTERPRETATION OF THE FOLLOWING IMAGES:    1. Left internal carotid artery - cranial station - oblique and lateral angiograms    2. Right internal carotid artery - cranial station - PA and lateral angiograms    3. Right internal carotid artery cranial station 3-D cine rotational angiogram - viewed and manipulated on a separate workstation    4. Follow-up angiogram # 1 -right internal carotid artery - cranial station - oblique and lateral angiogram to obtain working position for safe aneurysm embolization    5. Follow-up angiogram # 2 - right internal carotid artery - cranial station - oblique and lateral angiogram following introducing the first coil (Target XL coil 14 mm x 50 cm) inside the aneurysm and before deployment. 6.  follow-up angiogram #3 right internal carotid artery cranial station oblique and lateral angiogram following deployment of the second microcoil target XL coil 8 mm x 30 cm  7. Follow-up angiogram #4 right internal carotid artery oblique and lateral angiograms following The advancement of the first part of the flow diverter in the supraclinoid segment of the right internal carotid artery across the neck of the aneurysm    8.  Follow up angiogram # 5 -right internal carotid artery cranial station oblique and lateral angiograms following deployment of the flow diverter device surpass evolve 4 mm x 15 mm     9. Follow up angiogram # 6 right common carotid artery cranial station and are PA and lateral angiograms to evaluate for thromboembolic complications    10. Right vertebral artery - cranial station - AP and lateral angiograms    11. Left internal carotid artery - cranial station - AP, Oblique and lateral angiograms    12. Left vertebral artery - cranial station - AP and lateral angiogams    13. Left femoral artery angiogram    INDICATIONS: Ms. Ervin Flaherty is a 55-year-old right-handed woman with prior history of subarachnoid hemorrhage s/p embolization of anterior communicating aneurysm. She is known to have right cavernous carotid aneurysm. She is scheduled today for elective flow diverter embolization of the aneurysms of the right internal carotid artery cavernous segment. The patient has been on aspirin and Plavix for more than 7weeks. Patient was found to be not complete responder to Plavix. Brilinta has been started last night. The purpose, alternative modalities, risks, and benefits of the procedure were explained to the patient and her brother who is the power of  and informed consent was then obtained. DESCRIPTION OF THE PROCEDURE AND FINDINGS: The patient was brought to the angiography suite and placed supine on the table. The anesthesiology team managed all aspects of airway control, sedation, paralysis, and blood pressure management. An endotracheal tube and radial arterial line were placed by the anesthesiology team. Both groins were prepped and draped in the usual sterile fashion. Bony landmarks were identified, and an imaginary line between the pubic tubercle and the anterior superior iliac spine was identified on the right. This corresponds to the left inguinal ligament.  Four fingerbreadths below the left inguinal ligament the left femoral artery pulsations were palpated. Access to the left femoral artery was obtained using the modified Seldinger technique and an 18 gauge thinwall needle. A J-wire was then used to place a 8 Western Kathy long sheath into the descending aorta. The sheath was then attached to continuous heparinized flush drip. A 5 Scottish Terumo catheter was introduced into the sheath over a Glidewire. Selection of the right common carotid artery was achieved under fluoroscopy. PA and lateral angiograms of the cranial station were obtained through hand injection. This angiogram demonstrate normal carotid bifurcation. Under fluoroscopy the catheter was advanced into the right internal carotid artery. PA and lateral angiograms of the right internal carotid artery from the cranial station where obtained this was followed by 3-D cine angiogram was obtained using the autoinjector at a rate of 3x18. Those images were viewed and manipulated at separate workstation. The distal cervical, petrous, laceral, cavernous, and supraclinoid carotid segments appear within normal limits except for the large cavernous segment aneurysm measuring 16 mm x 15.5 mm with a neck measuring 3.6 mm. The middle cerebral artery (MCA) and its terminal branches are unremarkable. Bilateral anterior cerebral arteries and their branches were reviewed and they look normal.  There is no filling in the previously coiled anterior communicating aneurysm. There are no other aneurysms, AVMs or stenotic lesions. Transit times are normal and the venous phase is unremarkable. 5000 units of heparin were given intravenously followed by recurrent doses of heparin to maintain ACT between 250-300. A roadmap was performed. Diagnostic catheter was exchanged for a previously prepped system of intermediate catheter and the guide catheter. An SL 10 microcatheter/Synchro microwire were introduced into the system under fluoroscopy.  Under roadmap directions the microwire and the microcatheter were positioned inside the aneurysm without difficulty. Based on measurements of the aneurysm the following coils were introduced and deployed inside the aneurysm on the road map guidance:  1. Target XL coil 14 mm x 50 cm  2. Target XL coil 8 mm x 30 cm. Follow-up angiograms were performed after deployment of the second microcoil this demonstrated containment of the coils within the aneurysm. The SL 10 microcatheter was taken out. An XP 27 microcatheter was introduced over the Synchro wire inside the system and positioned under roadmap into the right middle cerebral artery M1 segment. Based on the vessel dimensions we selected surpass evolve flow diverter measuring 4 mm x 15 mm. The microwire was taken out. Under fluoroscopy the flow diverter device was advanced to the tip of the microcatheter. Under fluoroscopy unsheathing of the device was started. Follow-up angiogram was performed after unsheathing of about one fourth of the device to ensure opening and wall a position. The device was opening very well with wall opposition. We continued unsheathing of the device until its completely deployed. Follow up angiogram of the right internal carotid artery from the cranial station was obtained using an injection. This demonstrated complete opening of the device and initial delayed emptying of the larger aneurysm. There was no embolic complications. After waiting 10 minutes another follow-up angiogram was performed to ensure integrity of the vessel wall and no collection of thrombi on the flow diverter device. At this point the guide catheter was taken out. The diagnostic catheter was inserted again over a Glidewire under fluoroscopy. Selection of the right vertebral artery was performed. PA and lateral angiograms from the cranial station were obtained. This angiogram demonstrated normal posterior circulation with no evidence of aneurysms.   Under fluoroscopy the catheter was positioned in the left internal carotid artery. PA and lateral angiograms from the cranial station were obtained using hand-injection. This angiogram demonstrate normal left anterior circulation with no further aneurysms. The previously coiled anterior communicating aneurysm is no longer filling. The catheter was next positioned in the left vertebral artery. PA and lateral angiograms from the cranial station was obtained. This angiogram demonstrated normal posterior circulation with no evidence of aneurysms. The catheter was removed from the system. An angiogram of the right common femoral and external iliac arteries was performed to confirm that the sheath was not in the superficial femoral branch. The 8 -Afghan short sheath introducer was removed over a J-wire and exchanged for a 8 Western Kathy AngioSeal closure device. The AngioSeal closure devise was successfully deployed and hemostasis was achieved. A sterile dressing was placed and the patient was transferred to the PACU in stable condition. The patient tolerated the procedure well. Dr. Jennifer Cornejo was present for the entire procedure and subsequent image interpretation. IMPRESSION:    1. Right internal carotid artery cavernous segment large aneurysm measuring 16 mm x 15 mm with a neck measuring 3.6 mm. .    2.  Successful embolization of the aneurysm using 2 microcoils and surpass evolve flow diverter device as described above. 3.  The previously coiled anterior communicating aneurysm has no residual filling.

## 2022-06-10 NOTE — H&P
Neurology Pre-Procedure History and Physical Exam Note    Date:6/10/2022       Room:[address] [place] [address]  Patient Margarita Wilks     YOB: 1960     Age:62 y.o. 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 Pt residing at a nursing facility since 2019 after her aneurysmal rupture. Patient reports that she has daily headaches, speech difficulty and lower extremity sensory change that are chronic. She also reports tremors that are a recent development but have been attributed to medication side effect by her outpatient neurologist, med to be weaned by psychiatry. Pt denies syncope, unilateral weakness, vision changes. Interval history 6/8/22: The patient is not having any new neurologic symptoms today. Interval history 6/9/22: The patient is relatively the same today. She has not been resting well. Her P2Y12 assay did not show adequate inhibition. Interval History 6/10/22:  P2Y12 still not inhibited. Will switch to Brilinta. There has been no change to the history and physical exam.     Review of Systems   Review of Systems   Constitutional: Negative for chills and fever. HENT: Negative for trouble swallowing and voice change. Eyes: Negative for visual disturbance. Respiratory: Negative for chest tightness and shortness of breath. Cardiovascular: Negative for chest pain and palpitations. Neurological: Negative for dizziness, facial asymmetry, speech difficulty, weakness, light-headedness, numbness and headaches. clear.   Eyes:      General: No scleral icterus. Extraocular Movements: Extraocular movements intact and EOM normal.      Pupils: Pupils are equal, round, and reactive to light. Cardiovascular:      Rate and Rhythm: Normal rate and regular rhythm. Heart sounds: No murmur heard. No friction rub. No gallop. Comments: Pulses:  R Dorsalis Pedis: 1+  L Dorsalis Pedis: 1+  Pulmonary:      Effort: Pulmonary effort is normal. No respiratory distress. Breath sounds: Normal breath sounds. Abdominal:      General: Abdomen is flat. There is no distension. Palpations: Abdomen is soft. Tenderness: There is no abdominal tenderness. Musculoskeletal:         General: No deformity or signs of injury. Skin:     General: Skin is warm and dry. Neurological:      Mental Status: She is alert and oriented to person, place, and time. Psychiatric:         Mood and Affect: Mood normal.         Speech: Speech normal.         Behavior: Behavior normal.       Neurologic Exam     Mental Status   Oriented to person, place, and time. Attention: normal. Concentration: normal.   Speech: speech is normal   Level of consciousness: alert  Normal comprehension. Cranial Nerves     CN II   Visual fields full to confrontation. Visual acuity: normal    CN III, IV, VI   Pupils are equal, round, and reactive to light. Extraocular motions are normal.   Right pupil: Size: 3 mm. Shape: regular. Reactivity: brisk. Left pupil: Size: 3 mm. Shape: regular. Reactivity: brisk. CN V   Facial sensation intact. Right facial sensation deficit: none  Left facial sensation deficit: none    CN VII   Facial expression full, symmetric.    Right facial weakness: none  Left facial weakness: none    CN VIII   CN VIII normal.   Hearing: intact    CN IX, X   CN IX normal.   Palate: symmetric    CN XI   CN XI normal.   Right trapezius strength: normal  Left trapezius strength: normal    CN XII   CN XII normal.   Tongue: not atrophic  Fasciculations: absent  Tongue deviation: none    Motor Exam   Muscle bulk: normal  Overall muscle tone: normal  Right arm pronator drift: absent  Left arm pronator drift: absent  Strength:  BUE: 4/5  BLE: 2/5     Sensory Exam   Right arm light touch: normal  Left arm light touch: normal  Right leg light touch: decreased from ankle  Left leg light touch: decreased from ankle (worse on the left)    Gait, Coordination, and Reflexes     Tremor   Resting tremor: present     Labs/Imaging/Diagnostics   Labs:  CBC:  Recent Labs     06/08/22  0716 06/09/22  0506 06/10/22  0104   WBC 6.6 8.0 8.5   RBC 4.08* 4.11* 4.03*   HGB 14.3 14.1 13.8   HCT 42.5 43.9 42.1   .2* 106.8* 104.5*   * 128* 124*     CHEMISTRIES:  Recent Labs     06/09/22  0506 06/10/22  0104 06/10/22  0405    137 140   K 4.4 4.1 4.3    101 102   CO2 27 28 28   BUN 18 21 21   CREATININE 0.5 0.7 0.6   GLUCOSE 104 102 104     PT/INR:  Recent Labs     06/07/22  1750 06/10/22  0405   INR 1.23* 1.13     APTT:  Recent Labs     06/07/22  1750 06/10/22  0405   APTT 32.0 30.1     LIVER PROFILE:No results for input(s): AST, ALT, BILIDIR, BILITOT, ALKPHOS in the last 72 hours. Imaging Last 24 Hours:  No results found. Assessment and Plan:        Hospital Problems           Last Modified POA    Aneurysm of right internal carotid artery 6/9/2022 Yes          1. 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 Also demonstrates anterior communicating artery coils/artifact. · Continue ASA 81mg daily  · Brilinta 180mg x1 this morning  · Scheduled for treatment of her aneurysm with anesthesia at 9am on 6/10/2022. · Written informed consent will be obtained from the patient's brother who is her medical power of .    · VerifyNow/P2Y12 platelet inhibition test on 6/8/2022 and 6/10/22 did not show adequate inhibition  · Maintain BP <130/80  · Neurology following    This patient was seen and evaluated with Dr. Lilia Vides and he is in agreement with the assessment and plan.     Electronically signed by Alexi Marie PA-C on 6/10/22 at 8:06 AM EDT

## 2022-06-10 NOTE — CONSULTS
CRITICAL CARE CONSULT NOTE      Patient:  Turner Elizabeth    Unit/Bed:4B-02/002-A  YOB: 1960  MRN: [id number]   PCP: No primary care provider on file. Date of Admission: 6/7/2022  Chief Complaint:-Acute postoperative pulmonary insufficiency    Assessment and Plan:      1. Acute postoperative pulmonary insufficiency: Patient was requiring 2 L prior to procedure. Post procedure increased oxygen likely secondary to sedation patient does have possible DYLLAN. Lungs are clear on exam.  Will obtain chest x-ray. Incentive spirometry  2. 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 Neurology is following. Continue with ASA and Brilinta x1 at 180 mg this morning. Neurology will manage antiplatelets. 3. Thrombocytopenia: Mild, platelets noted at 545 it appears that she has chronic thrombocytopenia at baseline. 4. History of brain aneurysm: Status post coiling and  shunt placement in 2019.  5. Elevated troponin: Serial troponins have been flat, likely demand ischemia. EKG shows no ischemic changes. No chest pain on exam.  Monitor closely. 6. Hypertension: Continue home meds, maintain blood pressure less than 160.  7. Seizures: History, on Keppra 1 g twice daily  8. Hyperlipidemia: Statin  9. Morbid obesity: BMI 55, diet and exercise encouraged. 10. History of ankle fracture: Patient has history of ankle fracture and concern for DVT and was on Eliquis. This was discussed with PCP per hospitalist.  This was stopped per neurology's recommendations. INITIAL H AND P AND ICU COURSE:    Turner Elizabeth is a 60-year-old white female who presented to [facility] for having 1.4 cm saccular aneurysm along the anterior lateral aspect of the right internal carotid artery.   Has a past medical history of reformed smoker, hypertension, hyperlipidemia, seizures, brain aneurysm past  shunt placement 2019. She presented to [facility] as advised by her neurologist Dr. Chary Nunez for dilation and management of her carotid aneurysm. Dr. Kristan Paulino office recommended starting her on aspirin and Plavix at that time. There was plan for coiling on Friday. Patient presented to ICU postprocedure. She was intact and at her baseline. She had no new focal neurological deficits. She does have baseline tremor which continued. She also has a speech impediment which her family at bedside states is chronic. She is requiring 5 L nasal cannula. She was on 2 L prior to surgery. She denies shortness of breath. Chest x-ray was ordered incentive spirometry. Past Medical History: Per HPI  Family History: Mother: Breast cancer, lung cancer Father: Diabetes, colon cancer, Alzheimer's, ADHD. Social History: Reformed smoker, denies alcohol use, denies drug use. Review of Systems   Constitutional: Negative for fatigue and fever. HENT: Negative for sore throat and trouble swallowing. Eyes: Negative for photophobia and visual disturbance. Respiratory: Negative for chest tightness, shortness of breath and wheezing. Cardiovascular: Negative for chest pain and leg swelling. Gastrointestinal: Negative for abdominal pain, nausea and vomiting. Endocrine: Negative for polydipsia and polyphagia. Genitourinary: Negative for decreased urine volume and flank pain. Musculoskeletal: Negative for back pain and neck pain. Skin: Negative for color change, pallor and rash. Allergic/Immunologic: Negative for food allergies. Neurological: Positive for speech difficulty (Reported baseline) and weakness (Reported baseline). Negative for dizziness and headaches. Hematological: Negative for adenopathy. Psychiatric/Behavioral: Negative for agitation and confusion. The patient is not nervous/anxious.         Scheduled Meds:   iopamidol  80 mL Other Once    sodium chloride flush  5-40 mL IntraVENous 2 times per day    ticagrelor  90 mg Oral BID    atorvastatin  20 mg Oral Nightly    levETIRAcetam  1,000 mg Oral BID    risperiDONE  0.5 mg Oral BID    senna  1 tablet Oral BID    amLODIPine  5 mg Oral Daily    cetirizine  10 mg Oral Daily    losartan  50 mg Oral Daily    aspirin  81 mg Oral Daily     Continuous Infusions:   sodium chloride         PHYSICAL EXAMINATION:  T:  97.0.  P:  75. RR:  14. B/P:  129/62. FiO2:  5. O2 Sat:  96.  I/O: 800/300  Body mass index is 55.73 kg/m². GCS: 15  General: Acute on chronically ill-appearing white female  HEENT:  normocephalic and atraumatic. No scleral icterus. PERR  Neck: supple. No Thyromegaly. Lungs: clear to auscultation. No retractions  Cardiac: RRR. No JVD. Abdomen: soft. Nontender. Extremities:  No clubbing, cyanosis, or edema x 4. Vasculature: capillary refill < 3 seconds. Palpable dorsalis pedis pulses. Skin:  warm and dry. Psych:  Alert and oriented x3. Affect appropriate  Lymph:  No supraclavicular adenopathy. Neurologic:  No focal deficit. No seizures. Data: (All radiographs, tracings, PFTs, and imaging are personally viewed and interpreted unless otherwise noted).  Sodium 140, potassium 4.3, chloride 102, CO2 28, BUN 21, creatinine 0.6, anion gap 10.0, glucose 104.  WBC 8.5, hemoglobin 13.8, hematocrit 42.1, platelet count 095   Telemetry shows normal sinus rhythm        Meets Continued ICU Level Care Criteria:    [x] Yes   [] No - Transfer Planned to listed location:  [] HOSPITALIST CONTACTED-      Case and plan discussed with Dr. Rehan Johnson and neuro, PA         Electronically signed by Christiano Roberts. NEPTALI Pham - CNP  CRITICAL CARE SPECIALIST  Patient seen by me including key components of medical care. Case discussed with NP. Case discussed with neurology. Trend renal function. Italicized font, if present,  represents changes to the note made by me. CC time 25 minutes. Time was discontiguous.  Time does not include procedure. Time does include my direct assessment of the patient and coordination of care. Time represents more than 50% of the time involved with patient care by the [address] [place] [address] team.  Electronically signed by Roderick Yanes.  Nichelle Palacios MD.

## 2022-06-10 NOTE — PROGRESS NOTES
Neuro Nurse Navigator Follow Up    This RN in to see patient. Patient's family, brother, daughter, and cousin, come into the room at this time as well. Patient is able to state her name, year and month, where she is at, and why she is in the hospital.  Patient states she does have a headache, but this is her \"normal headache. \"  Patient does have weakness of both legs and has off loading pressuring relieving boots on both feet.

## 2022-06-10 NOTE — ANESTHESIA PRE PROCEDURE
Department of Anesthesiology  Preprocedure Note       Name:  Odalis Dill   Age:  58 y.o.  :  1960                                          MRN:  [id number]         Date:  6/10/2022      Surgeon: * No surgeons listed *    Procedure: * No procedures listed *    Medications prior to admission:   Prior to Admission medications    Medication Sig Start Date End Date Taking? Authorizing Provider   miconazole (MICOTIN) 2 % powder Apply topically 2 times daily Apply topically 2 times daily.    Yes Historical Provider, MD   Menthol, Topical Analgesic, (BIOFREEZE) 4 % GEL Apply topically 2 times daily as needed (to neck and shoulders)   Yes Historical Provider, MD   nystatin (MYCOSTATIN) 472026 UNIT/GM powder Apply topically daily Apply topically daily under bilateral breast   Yes Historical Provider, MD   magnesium hydroxide (MILK OF MAGNESIA) 400 MG/5ML suspension Take 30 mLs by mouth daily as needed for Constipation    Yes Historical Provider, MD   acetaminophen (TYLENOL) 325 MG tablet Take 1,000 mg by mouth every 6 hours    Historical Provider, MD   ondansetron (ZOFRAN-ODT) 4 MG disintegrating tablet Take 4 mg by mouth every 6 hours as needed for Nausea or Vomiting  3/4/22   Historical Provider, MD   ketoconazole (NIZORAL) 2 % cream Apply topically once a week On mondays 8/3/21   Historical Provider, MD   loratadine (CLARITIN) 10 MG tablet Take 1 tablet by mouth daily 8/3/21   Historical Provider, MD   apixaban (ELIQUIS) 5 MG TABS tablet Take 5 mg by mouth 2 times daily  5/10/21   Historical Provider, MD   bisacodyl (DULCOLAX) 10 MG suppository 10 mg daily as needed for Constipation  20   Historical Provider, MD   levETIRAcetam (KEPPRA) 1000 MG tablet Take 1 tablet by mouth 2 times daily 3/29/21   Meseret Quesada MD   magnesium oxide (MAG-OX) 400 (241.3 Mg) MG TABS tablet Take 1 tablet by mouth 2 times daily 3/29/21   Meseret Quesada MD   risperiDONE (RISPERDAL) 0.5 MG tablet Take 0.5 mg by mouth 2 times daily    Historical Provider, MD   senna (SENOKOT) 8.6 MG tablet Take 1 tablet by mouth 2 times daily    Historical Provider, MD   amLODIPine (NORVASC) 5 MG tablet Take 5 mg by mouth daily 4/12/20   Historical Provider, MD   atorvastatin (LIPITOR) 20 MG tablet Take 20 mg by mouth nightly    Historical Provider, MD   losartan (COZAAR) 100 MG tablet Take 50 mg by mouth daily  5/1/20   Historical Provider, MD   Simethicone 80 MG TABS Take 80 mg by mouth every 8 hours as needed    Historical Provider, MD       Current medications:    Current Facility-Administered Medications   Medication Dose Route Frequency Provider Last Rate Last Admin    sodium chloride flush 0.9 % injection 5-40 mL  5-40 mL IntraVENous 2 times per day KRIS Stern-C   10 mL at 06/10/22 0739    sodium chloride flush 0.9 % injection 5-40 mL  5-40 mL IntraVENous PRN Leroy Luu PA-C        0.9 % sodium chloride infusion   IntraVENous PRN Leroy Luu PA-C        hydrALAZINE (APRESOLINE) injection 10 mg  10 mg IntraVENous Q4H PRN Pita Si H Le, DO        ondansetron (ZOFRAN-ODT) disintegrating tablet 4 mg  4 mg Oral Q8H PRN Pita Si H Le, DO        Or    ondansetron (ZOFRAN) injection 4 mg  4 mg IntraVENous Q6H PRN Pita Si H Le, DO        polyethylene glycol (GLYCOLAX) packet 17 g  17 g Oral Daily PRN Pita Si H Le, DO        acetaminophen (TYLENOL) tablet 650 mg  650 mg Oral Q6H PRN Pita Si H Le, DO        Or    acetaminophen (TYLENOL) suppository 650 mg  650 mg Rectal Q6H PRN Pita Si H Le, DO        atorvastatin (LIPITOR) tablet 20 mg  20 mg Oral Nightly Pita Si H Le, DO   20 mg at 06/09/22 2101    levETIRAcetam (KEPPRA) tablet 1,000 mg  1,000 mg Oral BID Pita Si H Le, DO   1,000 mg at 06/10/22 0737    risperiDONE (RISPERDAL) tablet 0.5 mg  0.5 mg Oral BID Pita Si H Le, DO   0.5 mg at 06/10/22 0738    senna (SENOKOT) tablet 8.6 mg  1 tablet Oral BID Pita Si H Le, DO   8.6 mg at 06/09/22 2100    simethicone [facility]) chewable tablet 80 mg  80 mg Oral Q8H PRN Pita Si H Le, DO        amLODIPine (NORVASC) tablet 5 mg  5 mg Oral Daily Pita Si H Le, DO   5 mg at 06/10/22 0738    cetirizine (ZYRTEC) tablet 10 mg  10 mg Oral Daily Pita Si H Le, DO   10 mg at 06/10/22 0738    losartan (COZAAR) tablet 50 mg  50 mg Oral Daily Pita Si H Le, DO   50 mg at 06/10/22 0737    aspirin chewable tablet 81 mg  81 mg Oral Daily Efrem Owens PA-C   81 mg at 06/10/22 4111       Allergies:     Allergies   Allergen Reactions    Geodon [Ziprasidone]        Problem List:    Patient Active Problem List   Diagnosis Code    Neutropenic fever ([facility] D70.9, R50.81    Septicemia ([facility] A41.9    Altered mental status R41.82    S/P  shunt Z98.2    Acute encephalopathy G93.40    Pancytopenia ([facility] D61.818    Elevated LFTs R79.89    Bilateral lower extremity edema R60.0    Hypokalemia E87.6    Hypoalbuminemia E88.09    Low BUN R79.89    Hx of seizure disorder Z86.69    Essential hypertension I10    Hyperlipidemia E78.5    Unspecified mood (affective) disorder ([facility] F39    Aneurysm of right internal carotid artery I67.1       Past Medical History:        Diagnosis Date    Brain aneurysm     Hypertension     Seizures ([facility]        Past Surgical History:        Procedure Laterality Date     SECTION      x2    GALLBLADDER SURGERY      partial     VENTRICULOPERITONEAL SHUNT N/A 3/22/2021    VENTRICULAR PERITONEAL SHUNT REVISION performed by Maddy James MD at [address] [place] [address] History:    Social History     Tobacco Use    Smoking status: Former Smoker     Quit date: 2016     Years since quittin.2    Smokeless tobacco: Never Used   Substance Use Topics    Alcohol use: Never                                Counseling given: Not Answered      Vital Signs (Current):   Vitals:    06/10/22 0315 06/10/22 0318 06/10/22 0337 06/10/22 0730   BP: (!) 122/59   137/77   Pulse: 59   64   Resp: 20   16   Temp: 98.1 °F (36.7 °C) 98.8 °F (37.1 °C)   TempSrc: Oral   Oral   SpO2: (!) 87% 95%  95%   Weight:   (!) 334 lb 14.1 oz (151.9 kg)    Height:                                                  BP Readings from Last 3 Encounters:   06/10/22 137/77   03/18/22 138/80   07/07/21 121/84       NPO Status:                                                                                 BMI:   Wt Readings from Last 3 Encounters:   06/10/22 (!) 334 lb 14.1 oz (151.9 kg)   03/18/22 294 lb 15.6 oz (133.8 kg)   07/07/21 294 lb 15.6 oz (133.8 kg)     Body mass index is 55.73 kg/m². CBC:   Lab Results   Component Value Date    WBC 8.5 06/10/2022    RBC 4.03 06/10/2022    HGB 13.8 06/10/2022    HCT 42.1 06/10/2022    .5 06/10/2022     06/10/2022       CMP:   Lab Results   Component Value Date     06/10/2022    K 4.3 06/10/2022    K 4.1 06/10/2022     06/10/2022    CO2 28 06/10/2022    BUN 21 06/10/2022    CREATININE 0.6 06/10/2022    LABGLOM >90 06/10/2022    GLUCOSE 104 06/10/2022    PROT 6.4 03/27/2021    CALCIUM 10.0 06/10/2022    BILITOT 0.3 03/27/2021    ALKPHOS 196 03/27/2021    AST 28 03/27/2021    ALT 17 03/27/2021       POC Tests: No results for input(s): POCGLU, POCNA, POCK, POCCL, POCBUN, POCHEMO, POCHCT in the last 72 hours.     Coags:   Lab Results   Component Value Date    INR 1.13 06/10/2022    APTT 30.1 06/10/2022       HCG (If Applicable): No results found for: PREGTESTUR, PREGSERUM, HCG, HCGQUANT     ABGs: No results found for: PHART, PO2ART, SVB0KTK, YNQ1JBC, BEART, R9DCAQFQ     Type & Screen (If Applicable):  No results found for: LABABO, LABRH    Drug/Infectious Status (If Applicable):  No results found for: HIV, HEPCAB    COVID-19 Screening (If Applicable):   Lab Results   Component Value Date    COVID19 NOT DETECTED 03/17/2021           Anesthesia Evaluation   no history of anesthetic complications:   Airway: Mallampati: II  TM distance: >3 FB   Neck ROM: full  Mouth opening: > = 3 FB   Dental: Pulmonary:normal exam              Patient did not smoke on day of surgery. Cardiovascular:  Exercise tolerance: poor (<4 METS),   (+) hypertension:,                   Neuro/Psych:   (+) CVA:, psychiatric history:            GI/Hepatic/Renal:   (+) morbid obesity          Endo/Other:              Pt had no PAT visit       Abdominal:             Vascular:   + PVD, aortic or cerebral, . Other Findings:           Anesthesia Plan      general     ASA 3     (ICU post-procedure)  Induction: intravenous. arterial line  MIPS: Postoperative opioids intended, Prophylactic antiemetics administered and Postoperative trial extubation. Anesthetic plan and risks discussed with patient. Plan discussed with CRNA.                     Claudette Edelman, MD   6/10/2022

## 2022-06-10 NOTE — PLAN OF CARE
Problem: Discharge Planning  Goal: Discharge to home or other facility with appropriate resources  Outcome: Progressing  Flowsheets (Taken 6/9/2022 2038)  Discharge to home or other facility with appropriate resources: Identify barriers to discharge with patient and caregiver     Problem: Safety - Adult  Goal: Free from fall injury  Outcome: Progressing     Problem: ABCDS Injury Assessment  Goal: Absence of physical injury  Outcome: Progressing  Flowsheets (Taken 6/9/2022 2030)  Absence of Physical Injury: Implement safety measures based on patient assessment     Problem: Skin/Tissue Integrity  Goal: Absence of new skin breakdown  Description: 1. Monitor for areas of redness and/or skin breakdown  2. Assess vascular access sites hourly  3. Every 4-6 hours minimum:  Change oxygen saturation probe site  4. Every 4-6 hours:  If on nasal continuous positive airway pressure, respiratory therapy assess nares and determine need for appliance change or resting period.   Outcome: Progressing

## 2022-06-10 NOTE — PROGRESS NOTES
Hospitalist Progress Note    Patient:  Mamie Miami      Unit/Bed:4A-12/012-A    YOB: 1960    MRN: [id number]       Acct: [de-identified]     PCP: No primary care provider on file. Date of Admission: 6/7/2022    Assessment/Plan:    1. Saccular Aneurysm of Right ICA: 1.4cm noted on CTA Head on 5/31/21, full report in Media. Apparently not significantly changed compared to their prior imaging. Patient is asymptomatic. Loaded with ASA and Plavix in the ED. Given another Plavix 300mg on 6/8 per Neurology. Continue ASA 81mg. Given Brilinta on 6/9 per Neuro since P2Y12 Assay is non-inhibitory. Plan for Cerebral Angiogram with possible flow diverter today at 9AM. She will be transferred to ICU for post-management. Maintain BP <130/80. 2. Acute Hypoxic Respiratory Failure: Required 2L overnight as SpO2 was 87%. She appears euvolemic on exam, although has fine crackles to RUL. Not diagnosed with sleep apnea, but suspect DYLLAN or OHS although this is the first night she has required oxygen. Get CXR. Wean as tolerated. 3. Hx Brain Aneurysms: s/p coiling and  shunt placement in 2019.   4. Elevated Troponin: Serial troponins have been flat. Suspect demand ischemia. Repeat EKG does not show any acute ischemic changes. Patient does not complain of CP. Will monitor for now. 5. HTN: Continue home meds. Hydralazine PRN to maintain BP <130/80. 6. Seizures: Continue Keppra 1000mg BID. Seizure, fall precautions. 7. HLD: Statin  8. Other: Discussed with PCP (Dr. Marisol Chowdhury) regarding patient being on Eliquis; he states she may have been on it in the past as DVT prophylaxis due to her prior ankle fracture. Discussed with Neurology who recommends to discontinue Eliquis as she is not indicated for it. No prior history of DVT/PE nor atrial fibrillation.     Expected discharge date:  TBD    Disposition:    [] Home       [] TCU       [] Rehab       [] Psych       [] SNF       [x] [facility]       [] Other-    Chief Complaint: Abnormal CTA [facility] Course:    Patient is a 59 y/o Female with PMH Essential HTN, HLD, Seizures, Brain Aneurysms s/p  Shunt Placement in 2019. She presents to [facility] ED after having outpatient imaging that reported a 1.4cm saccular aneurysm along the anterolateral aspect of the cavernous right internal carotid artery projecting anteriorly towards the right orbital apex although unchanged compared to prior on 5/31/21. She states that she was advised by her Neurologist Dr. Lety Walker to get admitted for further evaluation and management. Neurology was notified by the ED Provider who spoke with Dr. Mauricio Rowan who recommended her start on Aspirin and Plavix 300mg load with plan for possible coiling on Friday. Patient herself states she feels well and has no complaints at this time. She denies any dizziness, lightheadedness, chest pain, palpitations, shortness of breath, abdominal pain, N/V/D. Subjective (past 24 hours):   Patient is doing well and has no new focal neurological deficits. She denies any headaches, dizziness, lightheadedness. She did require 2L oxygen overnight due to SpO2 87%. She denies any shortness of breath, dyspnea, cough. She appears euvolemic and net negative -2.9L since admission. She denies using oxygen at her nursing facility nor sleep apnea. ROS (12 point review of systems completed. Pertinent positives noted. Otherwise ROS is negative).     Medications:  Reviewed    Infusion Medications    sodium chloride       Scheduled Medications    sodium chloride flush  5-40 mL IntraVENous 2 times per day    atorvastatin  20 mg Oral Nightly    levETIRAcetam  1,000 mg Oral BID    risperiDONE  0.5 mg Oral BID    senna  1 tablet Oral BID    amLODIPine  5 mg Oral Daily    cetirizine  10 mg Oral Daily    losartan  50 mg Oral Daily    aspirin  81 mg Oral Daily     PRN Meds: sodium chloride flush, sodium chloride, hydrALAZINE, ondansetron **OR** ondansetron, polyethylene glycol, acetaminophen **OR** acetaminophen, simethicone      Intake/Output Summary (Last 24 hours) at 6/10/2022 0911  Last data filed at 6/10/2022 0324  Gross per 24 hour   Intake 240 ml   Output 300 ml   Net -60 ml     Diet:  Diet NPO Exceptions are: Sips of Water with Meds  Diet NPO Exceptions are: Sips of Water with Meds    Exam:  /77   Pulse 64   Temp 98.8 °F (37.1 °C) (Oral)   Resp 16   Ht 5' 5\" (1.651 m)   Wt (!) 334 lb 14.1 oz (151.9 kg)   SpO2 95%   BMI 55.73 kg/m²     General appearance: Morbidly obese, bedbound female. Cooperative, NAD. HEENT: Pupils equal, round, and reactive to light. Conjunctivae/corneas clear. Neck: Supple, with full range of motion. No jugular venous distention. Trachea midline. Respiratory:  Normal respiratory effort. Mild fine crackles to RUL, otherwise clear to auscultation. Cardiovascular: Regular rate and rhythm with normal S1/S2 without murmurs, rubs or gallops. Abdomen: Soft, non-tender, non-distended with normal bowel sounds. Musculoskeletal: passive and active ROM x 4 extremities with decreased strength in all extremities. Intermittent resting tremors to RUE. Skin: Skin color, texture, turgor normal.  No rashes or lesions. Neurologic:  Neurovascularly intact without any focal sensory/motor deficits.  Cranial nerves: II-XII intact, grossly non-focal.  Psychiatric: Alert and oriented, thought content appropriate, normal insight  Capillary Refill: Brisk,< 3 seconds   Peripheral Pulses: +2 palpable, equal bilaterally     Labs:   Recent Labs     06/08/22  0716 06/09/22  0506 06/10/22  0104   WBC 6.6 8.0 8.5   HGB 14.3 14.1 13.8   HCT 42.5 43.9 42.1   * 128* 124*     Recent Labs     06/09/22  0506 06/10/22  0104 06/10/22  0405    137 140   K 4.4 4.1 4.3    101 102   CO2 27 28 28   BUN 18 21 21   CREATININE 0.5 0.7 0.6   CALCIUM 10.3 10.1 10.0     No results for input(s): AST, ALT, BILIDIR, BILITOT, ALKPHOS in the last 72 hours. Recent Labs     06/07/22  1750 06/10/22  0405   INR 1.23* 1.13     No results for input(s): Lorenso Favre in the last 72 hours. Microbiology:      Urinalysis:      Lab Results   Component Value Date    NITRU NEGATIVE 03/17/2021    WBCUA 10-15 03/17/2021    BACTERIA MANY 03/17/2021    RBCUA 3-5 03/17/2021    BLOODU NEGATIVE 03/17/2021    GLUCOSEU NEGATIVE 03/17/2021     Radiology:  XR CHEST PORTABLE   Final Result   1. No acute cardiopulmonary finding. **This report has been created using voice recognition software. It may contain minor errors which are inherent in voice recognition technology. **      Final report electronically signed by Dr Dimitri Romberg on 6/10/2022 8:25 AM      IR NS THORACIC ARCH CATH PLACEMENT W OR WO ANGIOGRAPHY    (Results Pending)     DVT prophylaxis: [] Lovenox                                 [x] SCDs                                 [] SQ Heparin                                 [] Encourage ambulation           [] Already on Anticoagulation     Code Status: Full Code    PT/OT Eval Status: N/A    Tele:   [x] yes             [] no    Electronically signed by Ascencion Lackey DO on 6/10/2022 at 9:11 AM

## 2022-06-10 NOTE — PROGRESS NOTES
5773 Patient received in cath lab for procedure family taken to waiting room. Neuro assessment complete and NIH 4 per Rolly Silva RN . Staff in room: Colette Cameron RN. Valentina Schmidt RT, Reed Bell RT, Carlene Berry RN, Zee Chase RN, and Africa Santana RT   [id number] procedure has been fully reviewed with the patient and written informed consent has been obtained. 0900 Awaiting for Dr. Mayela Tesfaye to assess another pt. and then to tell us to load the pt. onto the table. 0933 Pt. loaded on the table and being prepped for the procedure. 1156 Patient prepped for procedure. Vitals and sedation assessment per anesthesia. 7979 Timeout complete. 8891 Procedure started with Dr. Mayela Tesfaye  1511 Access obtained at right femoral artery and 8fr, 25cm sheath inserted per Dr. Mayela Tesfaye. 1006 Non taper angle catheter advanced and placed, 5fr  1009 Angiogram of the the right common carotid artery  1012 Angiogram of the right internal carotid artery  1022 3D cerebral angiogram complete. 1024 DrJaycee reviewing the images   Anesthesia giving 5,000units of Heparin IV  1038 Prepping and inserting the infinity long sheath   1043 Family updated. AXS Catalyst 5 catheter prepped and advanced  1054 Angiogram of the right internal carotid  1047 Synchro 2 Guidewire and [place] Point Microcatheter prepped and being advanced  1055 ACT drawn and running  1056 Synchro wire removed  0388 Prepping and placing the 14mm x 50cm Target XL Coil to the aneurysm of the right internal carotid. 1101 Act=237 1000units of Heparin IV per anesthesia  1105 Prepping and placing 8mm x 30cm Target XL Coil to the aneurysm  1111 Prepping the Surpass Flow Diverter System 4.0mm x 15mm  1114 [place] SL10 and Synchro guidewire removed.   1215 [facility] 27 prepped and being advanced with synchro wire  [address] being advanced to the right internal carotid aneurysm; 4.0mmx 15mm  1133 Surpass Evolve Flow Diverter System  deployed to the right internal carotid aneurysm; 4.0mmx 15mm  1135 Post Surpass angiogram complete  1145 Wire and catheters removed   1146 Right internal carotid angiogram complete  1147 Non taper angle catheter being prepped and advanced. 1150 Right vertebral artery angiogram  1152 Left internal carotid angiogram  1155 Family updated  1156 Left vertebral artery angiogram  1156 Catheter removed. 1156 Procedure completed; patient tolerated well. 1157 Right femoral artery angiogram complete. 0 8fr Angioseal deployed into the right femoral artery per Dr. Dominic Linares   473 1172 op site to right femoral site; area soft to touch with no bleeding noted. 1 Dr. Dominic Linares spoke to patient's family. 1213 Anesthesia waking up the pt. Right groin has no swelling or bleeding present and dressing dry and intact. 1218 Breathing tube removed per anesthesia  [phone] Dr. Dominic Linares talked to the pt. and she followed commands  [phone] Patient on bed; comfort ensured. Right femoral dressing remains dry and intact with area soft. 1223 Report called to 4D and nurse was taking at bedside. 1224 Patient taken to 4D via bed. Right femoral dressing remains dry and intact with area soft. Last set of vitals Neuro assessment: Pt. alert and wiggled her toes and squeezed my hands and was weak.         Xray time: 28.9 minutes: 2,241 mGy  Sedation time- minutes per anesthesia record (First dose until procedure end time)  Total contrast- 80ml's

## 2022-06-10 NOTE — CARE COORDINATION
6/10/22, 11:29 AM EDT    DISCHARGE ON GOING [address] [facility] day: 3  Location: -12/012-A Reason for admit: Saccular aneurysm [I67.1]  Aneurysm of right internal carotid artery [I67.1]   Procedure:   6/10 CXR No acute cardiopulmonary finding. 6/10 diagnostic angiogram with aneurysm embolization using surpass flow diverter device  Barriers to Discharge: Neurology following, seizure precautions, Eliquis, Asa, Lipitor, Plavix, Keppra. IV fluids. PCP: No primary care provider on file. Readmission Risk Score: 14.1 ( )%  Patient Goals/Plan/Treatment Preferences: [facility]. Plan is to return. SW following.

## 2022-06-11 LAB
ANION GAP SERPL CALCULATED.3IONS-SCNC: 13 MEQ/L (ref 8–16)
BUN BLDV-MCNC: 19 MG/DL (ref 7–22)
CALCIUM SERPL-MCNC: 10.2 MG/DL (ref 8.5–10.5)
CHLORIDE BLD-SCNC: 103 MEQ/L (ref 98–111)
CO2: 23 MEQ/L (ref 23–33)
CREAT SERPL-MCNC: 0.5 MG/DL (ref 0.4–1.2)
ERYTHROCYTE [DISTWIDTH] IN BLOOD BY AUTOMATED COUNT: 12.4 % (ref 11.5–14.5)
ERYTHROCYTE [DISTWIDTH] IN BLOOD BY AUTOMATED COUNT: 48.1 FL (ref 35–45)
GFR SERPL CREATININE-BSD FRML MDRD: > 90 ML/MIN/1.73M2
GLUCOSE BLD-MCNC: 137 MG/DL (ref 70–108)
HCT VFR BLD CALC: 44.6 % (ref 37–47)
HEMOGLOBIN: 14.3 GM/DL (ref 12–16)
MCH RBC QN AUTO: 33.8 PG (ref 26–33)
MCHC RBC AUTO-ENTMCNC: 32.1 GM/DL (ref 32.2–35.5)
MCV RBC AUTO: 105.4 FL (ref 81–99)
PLATELET # BLD: 125 THOU/MM3 (ref 130–400)
PMV BLD AUTO: 11 FL (ref 9.4–12.4)
POTASSIUM REFLEX MAGNESIUM: 4.3 MEQ/L (ref 3.5–5.2)
RBC # BLD: 4.23 MILL/MM3 (ref 4.2–5.4)
SODIUM BLD-SCNC: 139 MEQ/L (ref 135–145)
WBC # BLD: 11.4 THOU/MM3 (ref 4.8–10.8)

## 2022-06-11 PROCEDURE — 6370000000 HC RX 637 (ALT 250 FOR IP): Performed by: SOCIAL WORKER

## 2022-06-11 PROCEDURE — 99233 SBSQ HOSP IP/OBS HIGH 50: CPT | Performed by: INTERNAL MEDICINE

## 2022-06-11 PROCEDURE — 36415 COLL VENOUS BLD VENIPUNCTURE: CPT

## 2022-06-11 PROCEDURE — 80048 BASIC METABOLIC PNL TOTAL CA: CPT

## 2022-06-11 PROCEDURE — 6370000000 HC RX 637 (ALT 250 FOR IP): Performed by: STUDENT IN AN ORGANIZED HEALTH CARE EDUCATION/TRAINING PROGRAM

## 2022-06-11 PROCEDURE — 97166 OT EVAL MOD COMPLEX 45 MIN: CPT

## 2022-06-11 PROCEDURE — 2580000003 HC RX 258: Performed by: SOCIAL WORKER

## 2022-06-11 PROCEDURE — 85027 COMPLETE CBC AUTOMATED: CPT

## 2022-06-11 PROCEDURE — 2060000000 HC ICU INTERMEDIATE R&B

## 2022-06-11 RX ADMIN — AMLODIPINE BESYLATE 5 MG: 5 TABLET ORAL at 08:01

## 2022-06-11 RX ADMIN — RISPERIDONE 0.5 MG: 0.25 TABLET ORAL at 08:00

## 2022-06-11 RX ADMIN — SENNOSIDES 8.6 MG: 8.6 TABLET, COATED ORAL at 08:02

## 2022-06-11 RX ADMIN — RISPERIDONE 0.5 MG: 0.25 TABLET ORAL at 20:18

## 2022-06-11 RX ADMIN — LEVETIRACETAM 1000 MG: 500 TABLET, FILM COATED ORAL at 20:17

## 2022-06-11 RX ADMIN — TICAGRELOR 90 MG: 90 TABLET ORAL at 20:16

## 2022-06-11 RX ADMIN — CETIRIZINE HYDROCHLORIDE 10 MG: 10 TABLET, FILM COATED ORAL at 08:01

## 2022-06-11 RX ADMIN — TICAGRELOR 90 MG: 90 TABLET ORAL at 08:01

## 2022-06-11 RX ADMIN — SODIUM CHLORIDE, PRESERVATIVE FREE 10 ML: 5 INJECTION INTRAVENOUS at 08:02

## 2022-06-11 RX ADMIN — LOSARTAN POTASSIUM 50 MG: 50 TABLET, FILM COATED ORAL at 08:00

## 2022-06-11 RX ADMIN — ASPIRIN 81 MG CHEWABLE TABLET 81 MG: 81 TABLET CHEWABLE at 08:01

## 2022-06-11 RX ADMIN — ACETAMINOPHEN 650 MG: 325 TABLET ORAL at 00:57

## 2022-06-11 RX ADMIN — LEVETIRACETAM 1000 MG: 500 TABLET, FILM COATED ORAL at 08:00

## 2022-06-11 RX ADMIN — SODIUM CHLORIDE, PRESERVATIVE FREE 10 ML: 5 INJECTION INTRAVENOUS at 20:20

## 2022-06-11 RX ADMIN — ACETAMINOPHEN 650 MG: 325 TABLET ORAL at 20:16

## 2022-06-11 RX ADMIN — ATORVASTATIN CALCIUM 20 MG: 20 TABLET, FILM COATED ORAL at 20:17

## 2022-06-11 RX ADMIN — SENNOSIDES 8.6 MG: 8.6 TABLET, COATED ORAL at 20:16

## 2022-06-11 ASSESSMENT — ENCOUNTER SYMPTOMS
PHOTOPHOBIA: 1
TROUBLE SWALLOWING: 0
BACK PAIN: 0
NAUSEA: 0
VOICE CHANGE: 0
VOMITING: 0
WHEEZING: 0
SHORTNESS OF BREATH: 0
CHEST TIGHTNESS: 0
ABDOMINAL PAIN: 0
COUGH: 0
SORE THROAT: 0
COLOR CHANGE: 0
PHOTOPHOBIA: 0

## 2022-06-11 ASSESSMENT — VISUAL ACUITY: VA_NORMAL: 1

## 2022-06-11 ASSESSMENT — PAIN DESCRIPTION - DESCRIPTORS: DESCRIPTORS: ACHING

## 2022-06-11 ASSESSMENT — PAIN SCALES - GENERAL
PAINLEVEL_OUTOF10: 0
PAINLEVEL_OUTOF10: 3
PAINLEVEL_OUTOF10: 3

## 2022-06-11 ASSESSMENT — PAIN SCALES - WONG BAKER: WONGBAKER_NUMERICALRESPONSE: 0

## 2022-06-11 ASSESSMENT — PAIN DESCRIPTION - LOCATION: LOCATION: HEAD

## 2022-06-11 NOTE — ANESTHESIA POSTPROCEDURE EVALUATION
Department of Anesthesiology  Postprocedure Note    Patient: Gerald Coulter  MRN: [id number]  YOB: 1960  Date of evaluation: 6/11/2022  Time:  8:42 AM     Procedure Summary     Date: 06/10/22 Room / Location: [facility] CATH LAB    Anesthesia Start: 0931 Anesthesia Stop: 0992    Procedure: STR CATH NEURO W/ ANESTHESIA Diagnosis:     Scheduled Providers:  Responsible Provider: Liudmila Giron MD    Anesthesia Type: general ASA Status: 3          Anesthesia Type: No value filed. Jackelyn Phase I:      Jackelyn Phase II:      Last vitals: Reviewed and per EMR flowsheets.        Anesthesia Post Evaluation    Patient location during evaluation: bedside  Patient participation: complete - patient participated  Level of consciousness: awake and alert  Airway patency: patent  Nausea & Vomiting: no nausea  Complications: no  Cardiovascular status: blood pressure returned to baseline and hemodynamically stable  Respiratory status: acceptable and spontaneous ventilation  Hydration status: euvolemic

## 2022-06-11 NOTE — PROGRESS NOTES
Nannetteanulala Spears 60  INPATIENT OCCUPATIONAL THERAPY  [facility] NEUROSCIENCES 4A  EVALUATION    Time: Time In: 1330  Time Out: 1343  Timed Code Treatment Minutes: 0 Minutes  Minutes: 13          Date: 2022  Patient Name: Courtney Willard,   Gender: female      MRN: [id number]  : 1960  (58 y.o.)  Referring Practitioner: Brandon Maxwell. Allyson Gonzales, APRN-CNP  Diagnosis: Saccular aneurysm  Additional Pertinent Hx: Per chart review, Ruel Trivedi is a 79-year-old white female who presented to [facility] for having 1.4 cm saccular aneurysm along the anterior lateral aspect of the right internal carotid artery. Has a past medical history of reformed smoker, hypertension, hyperlipidemia, seizures, brain aneurysm past  shunt placement . She presented to [facility] as advised by her neurologist Dr. Mamie Bray for dilation and management of her carotid aneurysm. Dr. Yonny Vieira office recommended starting her on aspirin and Plavix at that time. There was plan for coiling on Friday. Patient presented to ICU postprocedure. She was intact and at her baseline. She had no new focal neurological deficits. She does have baseline tremor which continued. She also has a speech impediment which her family at bedside states is chronic. She is requiring 5 L nasal cannula. She was on 2 L prior to surgery. She denies shortness of breath. Chest x-ray was ordered incentive spirometry. \" STR CATH NEURO W/ ANESTHESIA on 6/10/22    Restrictions/Precautions:  Restrictions/Precautions: General Precautions,Fall Risk    Subjective  Chart Reviewed: Yes  Patient assessed for rehabilitation services?: Yes  Family / Caregiver Present: No    Subjective: RN approved session, patient supine in bed upon OT arrival and agreeable to OT eval.    Pain: Pain reported in hands from hx of arthritis.      Vitals: Vitals not assessed per clinical judgement, see nursing flowsheet    Social/Functional History:  Lives With: Home care staff  Type of Home: Facility  Home Layout: Able to Live on Main level with bedroom/bathroom  Home Access: Level entry  Home Equipment:  (hans lift.)   Bathroom Toilet: Bedside commode  Bathroom Accessibility: Accessible    Receives Help From: Personal care attendant  ADL Assistance: Needs assistance  Homemaking Assistance: Needs assistance  Homemaking Responsibilities: No  Ambulation Assistance: Needs assistance  Transfer Assistance: Needs assistance    Active : No          VISION:WFL    HEARING:  WFL    COGNITION: Slow Processing, Decreased Problem Solving, Decreased Safety Awareness and Difficulty Following Commands    RANGE OF MOTION:  Bilateral Upper Extremity:  WFL    STRENGTH:  Bilateral Upper Extremity:  Shldr flex. and ext. WFL, elbow flex and ext. WFL,  (F-).     SENSATION:   WFL    ADL:   No ADL's completed this session. Nila Gomez BALANCE:  Not assessed this date, patient at baseline. BED MOBILITY:  Not Tested    TRANSFERS:  Not assessed this date, patient at baseline. FUNCTIONAL MOBILITY:  Not assessed this date, patient at baseline. Activity Tolerance:  Patient tolerance of  treatment: fair. Patient lacks motivation to engage in OT session, patient at baseline functioning. Assessment:  Assessment: Patient presents at baseline functioning. No further OT warranted at this time. Prognosis: Fair  REQUIRES OT FOLLOW-UP: No  No Skilled OT: At baseline function,No OT goals identified  Decision Making: Medium Complexity    Treatment Initiated: No treatment initiated    Discharge Recommendations:  Subacute/Skilled Nursing [id number] with OT    Patient Education:     Patient Education  Education Given To: Patient  Education Provided: Role of Therapy  Barriers to Learning: Cognition    Plan:   .  See long-term goal time frame for expected duration of plan of care.   If no long-term goals established, a short length of stay is anticipated. Goals:  Patient goals : N/A - No OT goals identified at this time. Following session, patient left in safe position with all fall risk precautions in place.

## 2022-06-11 NOTE — PLAN OF CARE
Problem: Discharge Planning  Goal: Discharge to home or other facility with appropriate resources  Outcome: Progressing  Flowsheets (Taken 6/10/2022 1230 by Janene Chatterjee RN)  Discharge to home or other facility with appropriate resources:   Identify barriers to discharge with patient and caregiver   Arrange for needed discharge resources and transportation as appropriate     Problem: Pain  Goal: Verbalizes/displays adequate comfort level or baseline comfort level  Outcome: Progressing     Problem: Safety - Adult  Goal: Free from fall injury  Outcome: Progressing     Problem: ABCDS Injury Assessment  Goal: Absence of physical injury  Outcome: Progressing     Problem: Skin/Tissue Integrity  Goal: Absence of new skin breakdown  Description: 1. Monitor for areas of redness and/or skin breakdown  2. Assess vascular access sites hourly  3. Every 4-6 hours minimum:  Change oxygen saturation probe site  4. Every 4-6 hours:  If on nasal continuous positive airway pressure, respiratory therapy assess nares and determine need for appliance change or resting period.   Outcome: Progressing

## 2022-06-11 NOTE — PROGRESS NOTES
RN from [facility] received prescription for Greg Schein via fax and sent fax to their pharmacy. Patient's pharmacy will not dispense until patient is back at [facility]. Notified Latrice Nicolas, and will notify  tomorrow.

## 2022-06-11 NOTE — PROGRESS NOTES
CRITICAL CARE PROGRESS NOTE      Patient:  Cullen Esquivel    Unit/Bed:4B-02/002-A  YOB: 1960  MRN: [id number]   PCP: No primary care provider on file. Date of Admission: 6/7/2022  Chief Complaint:-Acute postoperative pulmonary insufficiency     Assessment and Plan:       1. Acute postoperative pulmonary insufficiency: Patient was requiring 2 L prior to procedure. Post procedure increased oxygen likely secondary to sedation patient does have possible DYLLAN. Lungs are clear on exam.   Incentive spirometry. May need to see pulmonary in outpatient setting for follow up. Chest xray shows possible small left plural effusion. 2. 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 Neurology is following. Continue with ASA and Brilinta x1 at 180 mg this morning. Neurology will manage antiplatelets. 3. Thrombocytopenia: Mild, platelets noted at 101 it appears that she has chronic thrombocytopenia at baseline. 4. History of brain aneurysm: Status post coiling and  shunt placement in 2019.  5. Elevated troponin: Serial troponins have been flat, likely demand ischemia. EKG shows no ischemic changes. No chest pain on exam.  Monitor closely. 6. Hypertension: Continue home meds, maintain blood pressure less than 160.  7. Seizures: History, on Keppra 1 g twice daily  8. Hyperlipidemia: Statin  9. Morbid obesity: BMI 55, diet and exercise encouraged. 10. History of ankle fracture: Patient has history of ankle fracture and concern for DVT and was on Eliquis. This was discussed with PCP per hospitalist.  This was stopped per neurology's recommendations.     INITIAL H AND P AND ICU COURSE:     Cullen Esquivel is a 42-year-old white female who presented to [facility] for having 1.4 cm saccular aneurysm along the anterior lateral aspect of the right internal carotid artery.   Has a past medical history of reformed smoker, hypertension, hyperlipidemia, seizures, brain aneurysm past  shunt placement 2019. She presented to [facility] as advised by her neurologist Dr. Giulia Nash for dilation and management of her carotid aneurysm. Dr. Néstor Garrett office recommended starting her on aspirin and Plavix at that time. There was plan for coiling on Friday. Patient presented to ICU postprocedure. She was intact and at her baseline. She had no new focal neurological deficits. She does have baseline tremor which continued. She also has a speech impediment which her family at bedside states is chronic. She is requiring 5 L nasal cannula. She was on 2 L prior to surgery. She denies shortness of breath. Chest x-ray was ordered incentive spirometry.     Past Medical History: Per HPI  Family History: Mother: Breast cancer, lung cancer Father: Diabetes, colon cancer, Alzheimer's, ADHD. Social History: Reformed smoker, denies alcohol use, denies drug use.       Review of Systems   Constitutional: Negative for fatigue and fever. HENT: Negative for sore throat and trouble swallowing. Eyes: Negative for photophobia and visual disturbance. Respiratory: Negative for chest tightness, shortness of breath and wheezing. Cardiovascular: Negative for chest pain and leg swelling. Gastrointestinal: Negative for abdominal pain, nausea and vomiting. Endocrine: Negative for polydipsia and polyphagia. Genitourinary: Negative for decreased urine volume and flank pain. Musculoskeletal: Positive for arthralgias (ankle pain). Negative for back pain and neck pain. Skin: Negative for color change, pallor and rash. Allergic/Immunologic: Negative for food allergies. Neurological: Positive for weakness. Negative for dizziness and numbness. Hematological: Negative for adenopathy. Psychiatric/Behavioral: Negative for agitation and confusion. The patient is not hyperactive. Scheduled Meds:   iopamidol  80 mL Other Once    sodium chloride flush  5-40 mL IntraVENous 2 times per day    ticagrelor  90 mg Oral BID    atorvastatin  20 mg Oral Nightly    levETIRAcetam  1,000 mg Oral BID    risperiDONE  0.5 mg Oral BID    senna  1 tablet Oral BID    amLODIPine  5 mg Oral Daily    cetirizine  10 mg Oral Daily    losartan  50 mg Oral Daily    aspirin  81 mg Oral Daily     Continuous Infusions:   sodium chloride         PHYSICAL EXAMINATION:  T:  98.5. P:  70. RR:  20. B/P:  128/61. FiO2:  6. O2 Sat:  95.  I/O: 800/650  Body mass index is 55.73 kg/m². GCS: 15  General:   Acute on chronically ill-appearing weight female  HEENT:  normocephalic and atraumatic. No scleral icterus. PERR  Neck: supple. No Thyromegaly. Lungs: clear to auscultation. No retractions  Cardiac: RRR. No JVD. Abdomen: soft. Nontender. Extremities:  No clubbing, cyanosis. Trace LE edema     Vasculature: capillary refill < 3 seconds. Palpable dorsalis pedis pulses. Skin:  warm and dry. Psych:  Alert and oriented x3. Affect appropriate  Lymph:  No supraclavicular adenopathy. Neurologic:  No focal deficit. No seizures. Chronic tremor    Data: (All radiographs, tracings, PFTs, and imaging are personally viewed and interpreted unless otherwise noted).  Sodium 139, potassium 4.3, chloride 103, CO2 23, BUN 19, creatinine 0.5, anion gap 13, glucose 137   WBC 11.4, hemoglobin 14.3, hematocrit 44.6, platelet count 976   Telemetry shows NSR    Chest x-ray 6/10/2022 reports low lung volumes with questionable small left-sided pleural effusion. Cardiomegaly. Meets Continued ICU Level Care Criteria:    [] Yes   [x] No - Transfer Planned to listed location: 4A  [x] HOSPITALIST CONTACTED- DR Lanny Churchill     Case and plan discussed with Dr. Omar Kothari and neurology         Electronically signed by NEPTALI Delgado - CNP  CRITICAL CARE SPECIALIST

## 2022-06-11 NOTE — PROGRESS NOTES
Neurology Progress Note    Date:6/11/2022       Room:[facility]02/002-A  Patient Molly Yanez     YOB: 1960     Age:62 y.o. 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 Pt residing at a nursing facility since 2019 after her aneurysmal rupture. Patient reports that she has daily headaches, speech difficulty and lower extremity sensory change that are chronic. She also reports tremors that are a recent development but have been attributed to medication side effect by her outpatient neurologist, med to be weaned by psychiatry. Pt denies syncope, unilateral weakness, vision changes. Interval history 6/8/22: The patient is not having any new neurologic symptoms today. Interval history 6/9/22: The patient is relatively the same today. She has not been resting well. Her P2Y12 assay did not show adequate inhibition. Interval History 6/11/22:  Pt s/p successful treatment of aneurysm with 2 coils and flow diverter. Pt requiring 2L NC overnight. Pt states that she feels ok but has a headache and is tired. FERGUSON is characterized as R sided, retro-orbital pain at a 3/10. This is consistent with her frequent headaches. She asks for dimmed lights but no other treatment at the time of exam. No other complaints. Review of Systems   Review of Systems   Constitutional: Positive for fatigue. HENT: Negative for trouble swallowing and voice change. Eyes: Positive for photophobia. Negative for visual disturbance.    Respiratory: Negative for cough and shortness of breath. Cardiovascular: Negative for chest pain and palpitations. Gastrointestinal: Negative for nausea and vomiting. Neurological: Positive for headaches. Negative for dizziness, facial asymmetry, speech difficulty, weakness, light-headedness and numbness. Psychiatric/Behavioral: Negative for agitation, behavioral problems and confusion. Medications   Scheduled Meds:    iopamidol  80 mL Other Once    sodium chloride flush  5-40 mL IntraVENous 2 times per day    ticagrelor  90 mg Oral BID    atorvastatin  20 mg Oral Nightly    levETIRAcetam  1,000 mg Oral BID    risperiDONE  0.5 mg Oral BID    senna  1 tablet Oral BID    amLODIPine  5 mg Oral Daily    cetirizine  10 mg Oral Daily    losartan  50 mg Oral Daily    aspirin  81 mg Oral Daily     Continuous Infusions:    sodium chloride       PRN Meds: sodium chloride flush, sodium chloride, acetaminophen, hydrALAZINE, ondansetron **OR** ondansetron, polyethylene glycol, simethicone    Past History    Past Medical History:   has a past medical history of Brain aneurysm, Hypertension, and Seizures ([address] Social History:   reports that she quit smoking about 6 years ago. She has never used smokeless tobacco. She reports that she does not drink alcohol and does not use drugs. Family History:   Family History   Problem Relation Age of Onset   [facility] Cancer Mother         Breast, Lung    Diabetes Father     Cancer Father         colon    Alzheimer's Disease Father     ADHD Father        Physical Examination      Vitals:  /61   Pulse 70   Temp 98.5 °F (36.9 °C) (Oral)   Resp 20   Ht 5' 5\" (1.651 m)   Wt (!) 334 lb 14.1 oz (151.9 kg)   SpO2 95%   BMI 55.73 kg/m²   Temp (24hrs), Av °F (36.7 °C), Min:97 °F (36.1 °C), Max:98.8 °F (37.1 °C)      I/O (24Hr):     Intake/Output Summary (Last 24 hours) at 2022 0714  Last data filed at 2022 0220  Gross per 24 hour   Intake 800 ml   Output 650 ml   Net 150 ml Physical Exam  Constitutional:       General: She is not in acute distress. Appearance: She is obese. HENT:      Head: Normocephalic and atraumatic. Right Ear: External ear normal.      Left Ear: External ear normal.      Nose: Nose normal.      Mouth/Throat:      Mouth: Mucous membranes are moist.      Pharynx: Oropharynx is clear. Eyes:      General: No scleral icterus. Extraocular Movements: Extraocular movements intact and EOM normal.      Pupils: Pupils are equal, round, and reactive to light. Cardiovascular:      Rate and Rhythm: Normal rate and regular rhythm. Heart sounds: No murmur heard. No friction rub. No gallop. Pulmonary:      Effort: Pulmonary effort is normal. No respiratory distress. Breath sounds: Normal breath sounds. Musculoskeletal:         General: No deformity or signs of injury. Skin:     General: Skin is warm and dry. Comments: Catheter access site is clean, dry and soft to palpation. No signs of infection or hematoma. Neurological:      Mental Status: She is alert and oriented to person, place, and time. Psychiatric:         Mood and Affect: Mood normal.         Speech: Speech normal.         Behavior: Behavior normal.       Neurologic Exam     Mental Status   Oriented to person, place, and time. Attention: normal. Concentration: normal.   Speech: speech is normal   Level of consciousness: alert  Normal comprehension. Cranial Nerves     CN II   Visual fields full to confrontation. Visual acuity: normal    CN III, IV, VI   Pupils are equal, round, and reactive to light. Extraocular motions are normal.   Right pupil: Size: 3 mm. Shape: regular. Reactivity: brisk. Left pupil: Size: 3 mm. Shape: regular. Reactivity: brisk. CN V   Facial sensation intact. Right facial sensation deficit: none  Left facial sensation deficit: none    CN VII   Facial expression full, symmetric.    Right facial weakness: none  Left facial weakness: none    CN VIII   CN VIII normal.   Hearing: intact    CN IX, X   CN IX normal.   Palate: symmetric    CN XI   CN XI normal.   Right trapezius strength: normal  Left trapezius strength: normal    CN XII   CN XII normal.   Tongue: not atrophic  Fasciculations: absent  Tongue deviation: none    Motor Exam   Muscle bulk: normal  Overall muscle tone: normal  Right arm pronator drift: absent  Left arm pronator drift: absent  Strength:  BUE: 4/5  BLE: 2/5     Sensory Exam   Right arm light touch: normal  Left arm light touch: normal  Right leg light touch: decreased from ankle  Left leg light touch: decreased from ankle (worse on the left)    Gait, Coordination, and Reflexes     Tremor   Resting tremor: present     Labs/Imaging/Diagnostics   Labs:  CBC:  Recent Labs     06/09/22  0506 06/10/22  0104 06/11/22  0346   WBC 8.0 8.5 11.4*   RBC 4.11* 4.03* 4.23   HGB 14.1 13.8 14.3   HCT 43.9 42.1 44.6   .8* 104.5* 105.4*   * 124* 125*     CHEMISTRIES:  Recent Labs     06/10/22  0104 06/10/22  0405 06/11/22  0346    140 139   K 4.1 4.3 4.3    102 103   CO2 28 28 23   BUN 21 21 19   CREATININE 0.7 0.6 0.5   GLUCOSE 102 104 137*     PT/INR:  Recent Labs     06/10/22  0405   INR 1.13     APTT:  Recent Labs     06/10/22  0405   APTT 30.1     LIVER PROFILE:No results for input(s): AST, ALT, BILIDIR, BILITOT, ALKPHOS in the last 72 hours. Imaging Last 24 Hours:  No results found. Assessment and Plan:        Hospital Problems           Last Modified POA    Aneurysm of right internal carotid artery 6/9/2022 Yes          1. 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 Wander Rowley  · CTA demonstrated 14mm saccular aneurysm anteriorly projecting from cavernous segment of right ICA. Also demonstrates anterior communicating artery coils/artifact.    · Continue ASA 81mg daily  · Continue Brilinta 90 BID x 6 months  · Plavix stopped as it was shown to be ineffective by P2Y12 inhibition testing. · Maintain BP <130/80  · Pt is ok for transfer to 4a step down unit, anticipate discharge on 6/12/22. · Coordinating with [address] [place] [address] for pt to have access to Brilinta at time of discharge. · Follow up appointments in outpatient Neurointerventional clinic with Dr. Elvia Hammond in 1 month and 6 months. This patient was seen and evaluated with Dr. Elvia Hammond and he is in agreement with the assessment and plan.     Electronically signed by Ryann Carvajal PA-C on 6/11/22 at 7:32 AM EDT

## 2022-06-11 NOTE — PROGRESS NOTES
Plan of care:    70-year-old female with saccular aneurysm of the right ICA status post embolization with 2 coils with flow diverter device. Patient is being transferred out of the ICU. Hospitalist to assume care. Continue with aspirin and Brilinta. Neurology following.     Christo Lopez MD

## 2022-06-12 LAB
ANION GAP SERPL CALCULATED.3IONS-SCNC: 9 MEQ/L (ref 8–16)
BUN BLDV-MCNC: 18 MG/DL (ref 7–22)
CALCIUM SERPL-MCNC: 10.1 MG/DL (ref 8.5–10.5)
CHLORIDE BLD-SCNC: 106 MEQ/L (ref 98–111)
CO2: 25 MEQ/L (ref 23–33)
CREAT SERPL-MCNC: 0.5 MG/DL (ref 0.4–1.2)
ERYTHROCYTE [DISTWIDTH] IN BLOOD BY AUTOMATED COUNT: 12.7 % (ref 11.5–14.5)
ERYTHROCYTE [DISTWIDTH] IN BLOOD BY AUTOMATED COUNT: 50.1 FL (ref 35–45)
GFR SERPL CREATININE-BSD FRML MDRD: > 90 ML/MIN/1.73M2
GLUCOSE BLD-MCNC: 108 MG/DL (ref 70–108)
HCT VFR BLD CALC: 41.5 % (ref 37–47)
HEMOGLOBIN: 13.2 GM/DL (ref 12–16)
MCH RBC QN AUTO: 34.4 PG (ref 26–33)
MCHC RBC AUTO-ENTMCNC: 31.8 GM/DL (ref 32.2–35.5)
MCV RBC AUTO: 108.1 FL (ref 81–99)
PLATELET # BLD: 118 THOU/MM3 (ref 130–400)
PMV BLD AUTO: 10.9 FL (ref 9.4–12.4)
POTASSIUM REFLEX MAGNESIUM: 4.3 MEQ/L (ref 3.5–5.2)
RBC # BLD: 3.84 MILL/MM3 (ref 4.2–5.4)
SODIUM BLD-SCNC: 140 MEQ/L (ref 135–145)
WBC # BLD: 7.7 THOU/MM3 (ref 4.8–10.8)

## 2022-06-12 PROCEDURE — 2060000000 HC ICU INTERMEDIATE R&B

## 2022-06-12 PROCEDURE — 80048 BASIC METABOLIC PNL TOTAL CA: CPT

## 2022-06-12 PROCEDURE — 6370000000 HC RX 637 (ALT 250 FOR IP): Performed by: STUDENT IN AN ORGANIZED HEALTH CARE EDUCATION/TRAINING PROGRAM

## 2022-06-12 PROCEDURE — 6370000000 HC RX 637 (ALT 250 FOR IP): Performed by: SOCIAL WORKER

## 2022-06-12 PROCEDURE — 2580000003 HC RX 258: Performed by: SOCIAL WORKER

## 2022-06-12 PROCEDURE — 36415 COLL VENOUS BLD VENIPUNCTURE: CPT

## 2022-06-12 PROCEDURE — 85027 COMPLETE CBC AUTOMATED: CPT

## 2022-06-12 PROCEDURE — 6360000002 HC RX W HCPCS: Performed by: STUDENT IN AN ORGANIZED HEALTH CARE EDUCATION/TRAINING PROGRAM

## 2022-06-12 PROCEDURE — 99232 SBSQ HOSP IP/OBS MODERATE 35: CPT | Performed by: HOSPITALIST

## 2022-06-12 PROCEDURE — 99232 SBSQ HOSP IP/OBS MODERATE 35: CPT | Performed by: SOCIAL WORKER

## 2022-06-12 RX ORDER — HYDRALAZINE HYDROCHLORIDE 20 MG/ML
10 INJECTION INTRAMUSCULAR; INTRAVENOUS EVERY 6 HOURS PRN
Status: DISCONTINUED | OUTPATIENT
Start: 2022-06-12 | End: 2022-06-13 | Stop reason: HOSPADM

## 2022-06-12 RX ADMIN — RISPERIDONE 0.5 MG: 0.25 TABLET ORAL at 08:43

## 2022-06-12 RX ADMIN — AMLODIPINE BESYLATE 5 MG: 5 TABLET ORAL at 08:43

## 2022-06-12 RX ADMIN — LOSARTAN POTASSIUM 50 MG: 50 TABLET, FILM COATED ORAL at 08:43

## 2022-06-12 RX ADMIN — ATORVASTATIN CALCIUM 20 MG: 20 TABLET, FILM COATED ORAL at 20:12

## 2022-06-12 RX ADMIN — TICAGRELOR 90 MG: 90 TABLET ORAL at 08:43

## 2022-06-12 RX ADMIN — HYDRALAZINE HYDROCHLORIDE 10 MG: 20 INJECTION, SOLUTION INTRAMUSCULAR; INTRAVENOUS at 09:04

## 2022-06-12 RX ADMIN — LEVETIRACETAM 1000 MG: 500 TABLET, FILM COATED ORAL at 20:12

## 2022-06-12 RX ADMIN — ACETAMINOPHEN 650 MG: 325 TABLET ORAL at 14:08

## 2022-06-12 RX ADMIN — LEVETIRACETAM 1000 MG: 500 TABLET, FILM COATED ORAL at 08:43

## 2022-06-12 RX ADMIN — POLYETHYLENE GLYCOL 3350 17 G: 17 POWDER, FOR SOLUTION ORAL at 08:59

## 2022-06-12 RX ADMIN — SENNOSIDES 8.6 MG: 8.6 TABLET, COATED ORAL at 20:11

## 2022-06-12 RX ADMIN — SODIUM CHLORIDE, PRESERVATIVE FREE 10 ML: 5 INJECTION INTRAVENOUS at 20:12

## 2022-06-12 RX ADMIN — SENNOSIDES 8.6 MG: 8.6 TABLET, COATED ORAL at 08:43

## 2022-06-12 RX ADMIN — CETIRIZINE HYDROCHLORIDE 10 MG: 10 TABLET, FILM COATED ORAL at 08:43

## 2022-06-12 RX ADMIN — ASPIRIN 81 MG CHEWABLE TABLET 81 MG: 81 TABLET CHEWABLE at 08:43

## 2022-06-12 RX ADMIN — RISPERIDONE 0.5 MG: 0.25 TABLET ORAL at 20:12

## 2022-06-12 RX ADMIN — TICAGRELOR 90 MG: 90 TABLET ORAL at 20:11

## 2022-06-12 ASSESSMENT — ENCOUNTER SYMPTOMS
PHOTOPHOBIA: 0
VOMITING: 0
NAUSEA: 0
VOICE CHANGE: 0
SHORTNESS OF BREATH: 0
COUGH: 0
TROUBLE SWALLOWING: 0

## 2022-06-12 ASSESSMENT — PAIN SCALES - GENERAL
PAINLEVEL_OUTOF10: 0
PAINLEVEL_OUTOF10: 3

## 2022-06-12 ASSESSMENT — PAIN DESCRIPTION - LOCATION: LOCATION: HEAD

## 2022-06-12 ASSESSMENT — VISUAL ACUITY: VA_NORMAL: 1

## 2022-06-12 ASSESSMENT — PAIN DESCRIPTION - DESCRIPTORS: DESCRIPTORS: ACHING

## 2022-06-12 NOTE — PROGRESS NOTES
Hospitalist Progress Note    Patient:  Migue Silva      Unit/Bed:4A-10/010-A    YOB: 1960    MRN: [id number]       Acct: [de-identified]     PCP: No primary care provider on file. Date of Admission: 6/7/2022    Assessment/Plan:    1. Saccular Aneurysm of Right ICA: 1.4cm noted on CTA Head on 5/31/21, full report in Media. Apparently not significantly changed compared to their prior imaging. Patient is asymptomatic. Loaded with ASA and Plavix in the ED. Given another Plavix 300mg on 6/8 per Neurology. Continue ASA 81mg. Given Brilinta on 6/9 per Neuro since P2Y12 Assay is non-inhibitory. Underwent embolization of her right internal carotid artery saccular aneurysm with 2 coils and surpass evolve flow diverter device on 6/10 with Dr. Vidya Drake. Plan is to continue ASA 81mg QD and Brilinta 90mg BID x6 months. Follow-up with Dr. Vidya Drake in one month and 6 months. 2. Hx Brain Aneurysms: s/p coiling and  shunt placement in 2019.   3. Thrombocytopenia, mild: Appears chronic and is at baseline. No evidence of bleeding. 4. Elevated Troponin: Serial troponins have been flat. Suspect demand ischemia. Repeat EKG does not show any acute ischemic changes. Patient does not complain of CP. Will monitor for now. 5. HTN: Continue home meds. Hydralazine PRN to maintain BP <130/80. 6. Seizures: Continue Keppra 1000mg BID. Seizure, fall precautions. 7. HLD: Statin   8. Other: Discussed with PCP (Dr. Altagracia Santana) regarding patient being on Eliquis; he states she may have been on it in the past as DVT prophylaxis due to her prior ankle fracture. Discussed with Neurology who recommends to discontinue Eliquis as she is not indicated for it. No prior history of DVT/PE nor atrial fibrillation.   9. Acute Post-op Pulmonary Insufficiency, resolved    Expected discharge date:  1-2 days pending pre-cert back to [facility]    Disposition:    [] Home       [] TCU       [] Rehab       [] Psych       [] SNF       [x] [address]  [facility]       [] Other-    Chief Complaint: Abnormal CTA [facility] Course:    Patient is a 57 y/o Female with PMH Essential HTN, HLD, Seizures, Brain Aneurysms s/p  Shunt Placement in 2019. She presents to [facility] ED after having outpatient imaging that reported a 1.4cm saccular aneurysm along the anterolateral aspect of the cavernous right internal carotid artery projecting anteriorly towards the right orbital apex although unchanged compared to prior on 5/31/21. She states that she was advised by her Neurologist Dr. Sebastián Campos to get admitted for further evaluation and management. Neurology was notified by the ED Provider who spoke with Dr. Dileep Seymour who recommended her start on Aspirin and Plavix 300mg load with plan for possible coiling on Friday. Patient herself states she feels well and has no complaints at this time. She denies any dizziness, lightheadedness, chest pain, palpitations, shortness of breath, abdominal pain, N/V/D. She underwent embolization of her right internal carotid artery saccular aneurysm with 2 coils and surpass evolve flow diverter device on 6/10 with Dr. Dileep Seymour. To be discharged with ASA 81mg QD and Brilinta 90mg BID x6 months. She is to follow-up with Dr. Dileep Seymour in 1 month and 6 months. Subjective (past 24 hours): No significant events overnight. Patient states she is doing well and has no questions or concerns at this time. She is tolerating PO intake. ROS (12 point review of systems completed. Pertinent positives noted. Otherwise ROS is negative).     Medications:  Reviewed    Infusion Medications    sodium chloride       Scheduled Medications    iopamidol  80 mL Other Once    sodium chloride flush  5-40 mL IntraVENous 2 times per day    ticagrelor  90 mg Oral BID    atorvastatin  20 mg Oral Nightly    levETIRAcetam  1,000 mg Oral BID    risperiDONE  0.5 mg Oral BID    senna  1 tablet Oral BID    amLODIPine  5 mg Oral Daily    cetirizine  10 mg Oral Daily    losartan  50 mg Oral Daily    aspirin  81 mg Oral Daily     PRN Meds: sodium chloride flush, sodium chloride, acetaminophen, hydrALAZINE, ondansetron **OR** ondansetron, polyethylene glycol, simethicone      Intake/Output Summary (Last 24 hours) at 6/12/2022 0752  Last data filed at 6/12/2022 0413  Gross per 24 hour   Intake 240 ml   Output 1250 ml   Net -1010 ml     Diet:  ADULT DIET; Regular    Exam:  BP (!) 141/82   Pulse 66   Temp 98.1 °F (36.7 °C) (Oral)   Resp 18   Ht 5' 5\" (1.651 m)   Wt (!) 334 lb 14.1 oz (151.9 kg)   SpO2 94%   BMI 55.73 kg/m²     General appearance: Morbidly obese, bedbound female. Cooperative, NAD. HEENT: Pupils equal, round, and reactive to light. Conjunctivae/corneas clear. Neck: Supple, with full range of motion. No jugular venous distention. Trachea midline. Respiratory:  Normal respiratory effort. Mild fine crackles to RUL, otherwise clear to auscultation. Cardiovascular: Regular rate and rhythm with normal S1/S2 without murmurs, rubs or gallops. Abdomen: Soft, non-tender, non-distended with normal bowel sounds. Musculoskeletal: passive and active ROM x 4 extremities with decreased strength in all extremities. Intermittent resting tremors to RUE. Skin: Skin color, texture, turgor normal.  No rashes or lesions. Neurologic:  Neurovascularly intact without any focal sensory/motor deficits.  Cranial nerves: II-XII intact, grossly non-focal.  Psychiatric: Alert and oriented, thought content appropriate, normal insight  Capillary Refill: Brisk,< 3 seconds   Peripheral Pulses: +2 palpable, equal bilaterally     Labs:   Recent Labs     06/10/22  0104 06/11/22  0346 06/12/22  0334   WBC 8.5 11.4* 7.7   HGB 13.8 14.3 13.2   HCT 42.1 44.6 41.5   * 125* 118*     Recent Labs     06/10/22  0405 06/11/22  0346 06/12/22  0334    139 140   K 4.3 4.3 4.3    103 106   CO2 28 23 25   BUN 21 19 18 CREATININE 0.6 0.5 0.5   CALCIUM 10.0 10.2 10.1     No results for input(s): AST, ALT, BILIDIR, BILITOT, ALKPHOS in the last 72 hours. Recent Labs     06/10/22  0405   INR 1.13     No results for input(s): Gala Espinal in the last 72 hours. Microbiology:      Urinalysis:      Lab Results   Component Value Date    NITRU NEGATIVE 03/17/2021    WBCUA 10-15 03/17/2021    BACTERIA MANY 03/17/2021    RBCUA 3-5 03/17/2021    BLOODU NEGATIVE 03/17/2021    GLUCOSEU NEGATIVE 03/17/2021     Radiology:  XR CHEST PORTABLE   Final Result   1. Low lung volumes. 2. Cardiomegaly. Question small left pleural effusion. This document has been electronically signed by: Isaiah Fields MD on    06/10/2022 08:24 PM      IR NS THORACIC ARCH CATH PLACEMENT W OR WO ANGIOGRAPHY   Final Result      XR CHEST PORTABLE   Final Result   1. No acute cardiopulmonary finding. **This report has been created using voice recognition software. It may contain minor errors which are inherent in voice recognition technology. **      Final report electronically signed by Dr Diamond Stone on 6/10/2022 8:25 AM        DVT prophylaxis: [] Lovenox                                 [x] SCDs                                 [] SQ Heparin                                 [] Encourage ambulation           [] Already on Anticoagulation     Code Status: Full Code    PT/OT Eval Status: N/A    Tele:   [x] yes             [] no    Electronically signed by Charity Ortiz DO on 6/12/2022 at 7:49 AM

## 2022-06-12 NOTE — PROGRESS NOTES
Neurology Progress Note    Date:6/12/2022       Room:[facility]/010  Patient Marlene Sanchez     YOB: 1960     Age:62 y.o. 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 Pt residing at a nursing facility since 2019 after her aneurysmal rupture. Patient reports that she has daily headaches, speech difficulty and lower extremity sensory change that are chronic. She also reports tremors that are a recent development but have been attributed to medication side effect by her outpatient neurologist, med to be weaned by psychiatry. Pt denies syncope, unilateral weakness, vision changes. Interval history 6/8/22: The patient is not having any new neurologic symptoms today. Interval history 6/9/22: The patient is relatively the same today. She has not been resting well. Her P2Y12 assay did not show adequate inhibition. Interval History 6/11/22:  Pt s/p successful treatment of aneurysm with 2 coils and flow diverter. Pt requiring 2L NC overnight. Pt states that she feels ok but has a headache and is tired. FERGUSON is characterized as R sided, retro-orbital pain at a 3/10. This is consistent with her frequent headaches. She asks for dimmed lights but no other treatment at the time of exam. No other complaints. Interval History 6/12/2022:  Patient reports that she is doing well today. Headache is resolved and no further need for oxygen. She is looking forward to getting back to her of her facility.   Patient reports she had some itching at bilateral groin sites which were prepped for procedure, no bleeding or discomfort at the access site. Review of Systems   Review of Systems   Constitutional: Negative for fatigue. HENT: Negative for trouble swallowing and voice change. Eyes: Negative for photophobia and visual disturbance. Respiratory: Negative for cough and shortness of breath. Cardiovascular: Negative for chest pain and palpitations. Gastrointestinal: Negative for nausea and vomiting. Neurological: Negative for dizziness, facial asymmetry, speech difficulty, weakness, light-headedness, numbness and headaches. Psychiatric/Behavioral: Negative for agitation, behavioral problems and confusion. Medications   Scheduled Meds:    iopamidol  80 mL Other Once    sodium chloride flush  5-40 mL IntraVENous 2 times per day    ticagrelor  90 mg Oral BID    atorvastatin  20 mg Oral Nightly    levETIRAcetam  1,000 mg Oral BID    risperiDONE  0.5 mg Oral BID    senna  1 tablet Oral BID    amLODIPine  5 mg Oral Daily    cetirizine  10 mg Oral Daily    losartan  50 mg Oral Daily    aspirin  81 mg Oral Daily     Continuous Infusions:    sodium chloride       PRN Meds: hydrALAZINE, sodium chloride flush, sodium chloride, acetaminophen, ondansetron **OR** ondansetron, polyethylene glycol, simethicone    Past History    Past Medical History:   has a past medical history of Brain aneurysm, Hypertension, and Seizures ([facility] [address] Social History:   reports that she quit smoking about 6 years ago. She has never used smokeless tobacco. She reports that she does not drink alcohol and does not use drugs.      Family History:   Family History   Problem Relation Age of Onset   [facility] Cancer Mother         Breast, Lung    Diabetes Father     Cancer Father         colon    Alzheimer's Disease Father     ADHD Father        Physical Examination      Vitals:  /77   Pulse 76   Temp 97.5 °F (36.4 °C) (Oral)   Resp 18   Ht 5' 5\" (1.651 m) Wt (!) 334 lb 14.1 oz (151.9 kg)   SpO2 96%   BMI 55.73 kg/m²   Temp (24hrs), Av.9 °F (36.6 °C), Min:97.5 °F (36.4 °C), Max:98.4 °F (36.9 °C)      I/O (24Hr): Intake/Output Summary (Last 24 hours) at 2022 1252  Last data filed at 2022 0413  Gross per 24 hour   Intake --   Output 750 ml   Net -750 ml       Physical Exam  Constitutional:       General: She is not in acute distress. Appearance: She is obese. HENT:      Head: Normocephalic and atraumatic. Right Ear: External ear normal.      Left Ear: External ear normal.      Nose: Nose normal.      Mouth/Throat:      Mouth: Mucous membranes are moist.      Pharynx: Oropharynx is clear. Eyes:      General: No scleral icterus. Extraocular Movements: Extraocular movements intact and EOM normal.      Pupils: Pupils are equal, round, and reactive to light. Cardiovascular:      Rate and Rhythm: Normal rate and regular rhythm. Heart sounds: No murmur heard. No friction rub. No gallop. Pulmonary:      Effort: Pulmonary effort is normal. No respiratory distress. Breath sounds: Normal breath sounds. Musculoskeletal:         General: No deformity or signs of injury. Skin:     General: Skin is warm and dry. Comments: Catheter access site is clean, dry and soft to palpation. No signs of infection or hematoma. Neurological:      Mental Status: She is alert and oriented to person, place, and time. Psychiatric:         Mood and Affect: Mood normal.         Speech: Speech normal.         Behavior: Behavior normal.       Neurologic Exam     Mental Status   Oriented to person, place, and time. Attention: normal. Concentration: normal.   Speech: speech is normal   Level of consciousness: alert  Normal comprehension. Cranial Nerves     CN II   Visual fields full to confrontation. Visual acuity: normal    CN III, IV, VI   Pupils are equal, round, and reactive to light.   Extraocular motions are normal.   Right pupil: Size: 3 mm. Shape: regular. Reactivity: brisk. Left pupil: Size: 3 mm. Shape: regular. Reactivity: brisk. CN V   Facial sensation intact. Right facial sensation deficit: none  Left facial sensation deficit: none    CN VII   Facial expression full, symmetric. Right facial weakness: none  Left facial weakness: none    CN VIII   CN VIII normal.   Hearing: intact    CN IX, X   CN IX normal.   Palate: symmetric    CN XI   CN XI normal.   Right trapezius strength: normal  Left trapezius strength: normal    CN XII   CN XII normal.   Tongue: not atrophic  Fasciculations: absent  Tongue deviation: none    Motor Exam   Muscle bulk: normal  Overall muscle tone: normal  Right arm pronator drift: absent  Left arm pronator drift: absent  Strength:  BUE: 4/5  BLE: 2/5     Sensory Exam   Right arm light touch: normal  Left arm light touch: normal  Right leg light touch: decreased from ankle  Left leg light touch: decreased from ankle (worse on the left)    Gait, Coordination, and Reflexes     Tremor   Resting tremor: present     Labs/Imaging/Diagnostics   Labs:  CBC:  Recent Labs     06/10/22  0104 06/11/22  0346 06/12/22  0334   WBC 8.5 11.4* 7.7   RBC 4.03* 4.23 3.84*   HGB 13.8 14.3 13.2   HCT 42.1 44.6 41.5   .5* 105.4* 108.1*   * 125* 118*     CHEMISTRIES:  Recent Labs     06/10/22  0405 06/11/22  0346 06/12/22  0334    139 140   K 4.3 4.3 4.3    103 106   CO2 28 23 25   BUN 21 19 18   CREATININE 0.6 0.5 0.5   GLUCOSE 104 137* 108     PT/INR:  Recent Labs     06/10/22  0405   INR 1.13     APTT:  Recent Labs     06/10/22  0405   APTT 30.1     LIVER PROFILE:No results for input(s): AST, ALT, BILIDIR, BILITOT, ALKPHOS in the last 72 hours. Imaging Last 24 Hours:  No results found.       Assessment and Plan:        Hospital Problems           Last Modified POA    Aneurysm of right internal carotid artery 6/9/2022 Yes          1. 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 Maya Gu  · CTA demonstrated 14mm saccular aneurysm anteriorly projecting from cavernous segment of right ICA. Also demonstrates anterior communicating artery coils/artifact. · Continue ASA 81mg daily  · Continue Brilinta 90 BID x 6 months  · Plavix stopped as it was shown to be ineffective by P2Y12 inhibition testing. · Maintain BP <130/80  · Pt is ok for discharge back to her facility[facility]. Rx for Brilinta faxed to facility and they have communicated that it will be delivered from pharmacy once she is back. Facility educated about the importance of this medication and risk of stroke with missed doses. D/C will be pending pre-cert approval and at that time, [facility] must be able to ensure that their pharmacy will be able to deliver [address] before the next dose is due. · Follow up appointments in outpatient Neurointerventional clinic with Dr. Maya Gu in 1 month and 6 months. Our office will schedule appt with [facility] and inform brother as well. No further workup or recommendations per neurology, we will sign off. This patient was discussed with Dr. Maya Gu and he is in agreement with the assessment and plan.     Electronically signed by Carolina Samaniego PA-C on 6/12/22 at 1:16 PM EDT

## 2022-06-12 NOTE — CARE COORDINATION
6/12/22, 9:54 AM EDT    DISCHARGE ON GOING [facility] [facility] day: 5  Location: 4A-10/010-A Reason for admit: Saccular aneurysm [I67.1]  Aneurysm of right internal carotid artery [I67.1]   Barriers to Discharge: Today this CM spoke with Vaughn PONCE with Neuro and [facility]EN[facility]. Rui Schulz is inquiring regarding the pre-cert for this pt and this CM told him that we have not been notified that the pre-cert is back ( or even started). Rui Schulz states he also needs to know that the Brilinta is ON HAND at the facility prior to the pt arriving as pt must have this with no missed doses due to delivery of med to the facility ([facility]). Nataly states that the script was already faxed to them but she states she was told that the prescription will not be filled until the pt is there. Rui Schulz wants the med there prior to her arrival.  This information is left per voicemail for the  and CM who will have this pt on Monday 6-13-22. PCP: No primary care provider on file. Readmission Risk Score: 15.6 ( )%  Patient Goals/Plan/Treatment Preferences: Return to [facility]. Needs precert per  whiteboard note.  Needs Covid test. I was angry

## 2022-06-13 VITALS
HEART RATE: 86 BPM | DIASTOLIC BLOOD PRESSURE: 85 MMHG | HEIGHT: 65 IN | BODY MASS INDEX: 48.82 KG/M2 | SYSTOLIC BLOOD PRESSURE: 158 MMHG | TEMPERATURE: 98.2 F | RESPIRATION RATE: 18 BRPM | WEIGHT: 293 LBS | OXYGEN SATURATION: 95 %

## 2022-06-13 LAB
ANION GAP SERPL CALCULATED.3IONS-SCNC: 11 MEQ/L (ref 8–16)
BUN BLDV-MCNC: 14 MG/DL (ref 7–22)
CALCIUM SERPL-MCNC: 10.3 MG/DL (ref 8.5–10.5)
CHLORIDE BLD-SCNC: 102 MEQ/L (ref 98–111)
CO2: 25 MEQ/L (ref 23–33)
CREAT SERPL-MCNC: 0.4 MG/DL (ref 0.4–1.2)
ERYTHROCYTE [DISTWIDTH] IN BLOOD BY AUTOMATED COUNT: 12.4 % (ref 11.5–14.5)
ERYTHROCYTE [DISTWIDTH] IN BLOOD BY AUTOMATED COUNT: 48.8 FL (ref 35–45)
GFR SERPL CREATININE-BSD FRML MDRD: > 90 ML/MIN/1.73M2
GLUCOSE BLD-MCNC: 127 MG/DL (ref 70–108)
HCT VFR BLD CALC: 43 % (ref 37–47)
HEMOGLOBIN: 13.9 GM/DL (ref 12–16)
MCH RBC QN AUTO: 34.3 PG (ref 26–33)
MCHC RBC AUTO-ENTMCNC: 32.3 GM/DL (ref 32.2–35.5)
MCV RBC AUTO: 106.2 FL (ref 81–99)
PLATELET # BLD: 116 THOU/MM3 (ref 130–400)
PMV BLD AUTO: 10.6 FL (ref 9.4–12.4)
POTASSIUM REFLEX MAGNESIUM: 3.8 MEQ/L (ref 3.5–5.2)
RBC # BLD: 4.05 MILL/MM3 (ref 4.2–5.4)
SARS-COV-2, NAAT: NOT  DETECTED
SODIUM BLD-SCNC: 138 MEQ/L (ref 135–145)
WBC # BLD: 7.8 THOU/MM3 (ref 4.8–10.8)

## 2022-06-13 PROCEDURE — 6360000002 HC RX W HCPCS: Performed by: STUDENT IN AN ORGANIZED HEALTH CARE EDUCATION/TRAINING PROGRAM

## 2022-06-13 PROCEDURE — 85027 COMPLETE CBC AUTOMATED: CPT

## 2022-06-13 PROCEDURE — 99239 HOSP IP/OBS DSCHRG MGMT >30: CPT | Performed by: HOSPITALIST

## 2022-06-13 PROCEDURE — 80048 BASIC METABOLIC PNL TOTAL CA: CPT

## 2022-06-13 PROCEDURE — 6370000000 HC RX 637 (ALT 250 FOR IP): Performed by: SOCIAL WORKER

## 2022-06-13 PROCEDURE — 6370000000 HC RX 637 (ALT 250 FOR IP): Performed by: STUDENT IN AN ORGANIZED HEALTH CARE EDUCATION/TRAINING PROGRAM

## 2022-06-13 PROCEDURE — 36415 COLL VENOUS BLD VENIPUNCTURE: CPT

## 2022-06-13 PROCEDURE — 87635 SARS-COV-2 COVID-19 AMP PRB: CPT

## 2022-06-13 RX ORDER — ASPIRIN 81 MG/1
81 TABLET, CHEWABLE ORAL DAILY
Qty: 30 TABLET | Refills: 3 | DISCHARGE
Start: 2022-06-13 | End: 2022-08-02 | Stop reason: SDUPTHER

## 2022-06-13 RX ORDER — BISACODYL 10 MG
10 SUPPOSITORY, RECTAL RECTAL DAILY PRN
Status: DISCONTINUED | OUTPATIENT
Start: 2022-06-13 | End: 2022-06-13 | Stop reason: HOSPADM

## 2022-06-13 RX ADMIN — SENNOSIDES 8.6 MG: 8.6 TABLET, COATED ORAL at 07:49

## 2022-06-13 RX ADMIN — ASPIRIN 81 MG CHEWABLE TABLET 81 MG: 81 TABLET CHEWABLE at 07:48

## 2022-06-13 RX ADMIN — TICAGRELOR 90 MG: 90 TABLET ORAL at 18:43

## 2022-06-13 RX ADMIN — POLYETHYLENE GLYCOL 3350 17 G: 17 POWDER, FOR SOLUTION ORAL at 07:49

## 2022-06-13 RX ADMIN — AMLODIPINE BESYLATE 5 MG: 5 TABLET ORAL at 07:49

## 2022-06-13 RX ADMIN — HYDRALAZINE HYDROCHLORIDE 10 MG: 20 INJECTION, SOLUTION INTRAMUSCULAR; INTRAVENOUS at 00:28

## 2022-06-13 RX ADMIN — LOSARTAN POTASSIUM 50 MG: 50 TABLET, FILM COATED ORAL at 07:49

## 2022-06-13 RX ADMIN — RISPERIDONE 0.5 MG: 0.25 TABLET ORAL at 07:49

## 2022-06-13 RX ADMIN — ACETAMINOPHEN 650 MG: 325 TABLET ORAL at 11:52

## 2022-06-13 RX ADMIN — CETIRIZINE HYDROCHLORIDE 10 MG: 10 TABLET, FILM COATED ORAL at 07:49

## 2022-06-13 RX ADMIN — HYDRALAZINE HYDROCHLORIDE 10 MG: 20 INJECTION, SOLUTION INTRAMUSCULAR; INTRAVENOUS at 15:50

## 2022-06-13 RX ADMIN — TICAGRELOR 90 MG: 90 TABLET ORAL at 07:48

## 2022-06-13 RX ADMIN — LEVETIRACETAM 1000 MG: 500 TABLET, FILM COATED ORAL at 07:53

## 2022-06-13 ASSESSMENT — PAIN SCALES - GENERAL
PAINLEVEL_OUTOF10: 0
PAINLEVEL_OUTOF10: 6
PAINLEVEL_OUTOF10: 0
PAINLEVEL_OUTOF10: 0

## 2022-06-13 ASSESSMENT — PAIN DESCRIPTION - DESCRIPTORS: DESCRIPTORS: ACHING

## 2022-06-13 ASSESSMENT — PAIN SCALES - WONG BAKER
WONGBAKER_NUMERICALRESPONSE: 0

## 2022-06-13 ASSESSMENT — PAIN DESCRIPTION - LOCATION: LOCATION: HEAD

## 2022-06-13 NOTE — PROGRESS NOTES
Spoke with RN Jose Young at [facility] who confirmed that they have received Brilinta from the pharmacy. Flossie Hamman will receive night time dose at Psychiatric and will continue ASA QD + Brilinta BID tomorrow at [facility].      Electronically signed by Iban Summers PA-C on 6/13/22 at 6:43 PM EDT

## 2022-06-13 NOTE — CARE COORDINATION
6/13/22, 12:16 PM EDT    DISCHARGE PLANNING EVALUATION    Received a call from admissions at [facility], they do have Brilinta ready for [place] and they would prefer for her to come to their facility today. SW will get a transport set and let her know a time. Made her aware it will probably be later this evening. Faxed information to [facility] and Janus Severe there states he will call back with a transport time. Received a call from Carmel Rodriguez is able to transport at 6:00PM. Left a message for admissions at [facility] asking for a call back confirming this time. Made DONALD Quevedo aware of time. Transport papers have been faxed to [facility].      1:33 PM    Received a call from [facility], they are agreeable to 6:00PM transport, asking that our RN give Brilinta before she is discharged that way the night dose is not missed. SAUL will updated DONALD Quevedo. 2:17 PM- [facility] transport forms.

## 2022-06-13 NOTE — CARE COORDINATION
6/13/22, 8:03 AM EDT    DISCHARGE PLANNING EVALUATION    Spoke to Neuro PA Jocelyne Vaughn, he has concerns about discharging Rosie LAYTON to [facility] before prescription is filled for new Brilinta. Patient cannot miss a single dose of this medication or she will have a stroke, Jocelyne Vaughn is concerned about the nursing home not having the med in time and patient missing it. Expressed these concerns to admissions at [facility], she states that they will have it ready when the patient comes. Med list and H&P faxed to admissions for them to look over. Admissions also states that patient will not need a precert to come back, as she is not going to be skillable. She will go back long term on her Medicaid benefit. Made KRIS Waters aware. 8:40 AM- Left a message for admissions asking for a call back, planning for discharge today.

## 2022-06-13 NOTE — CARE COORDINATION
6/13/22, 2:17 PM EDT    Patient goals/plan/ treatment preferences discussed by  and . Patient goals/plan/ treatment preferences reviewed with patient/ family. Patient/ family verbalize understanding of discharge plan and are in agreement with goal/plan/treatment preferences. Understanding was demonstrated using the teach back method. AVS provided by RN at time of discharge, which includes all necessary medical information pertaining to the patients current course of illness, treatment, post-discharge goals of care, and treatment preferences. Services At/After Discharge: [facility] ([facility]) and In ambulance- [facility] will be discharged today to [facility], where she is a long-term resident. Transport is set up for 6:00PM with [facility]. Jarrod Powell is aware of discharge plans and has asked for our RN to give Alma Atkinson before discharge so that she does not miss night dose. Communicated this information to DONALD Quevedo, she is aware of med plans and discharge plan.

## 2022-06-13 NOTE — DISCHARGE SUMMARY
Hospital Medicine Discharge Summary      Patient Identification:   Mamie Rusttingham   : 1960  MRN: [id number]   Account: [de-identified]      Patient's PCP: No primary care provider on file. Admit Date: 2022     Discharge Date:  22    Admitting Physician: Alexandria Calderon MD     Discharge Physician: Krystal Nascimento DO     Discharge Diagnoses:    1. Saccular Aneurysm of Right ICA: 1.4cm noted on CTA Head on 21, full report in Media. Apparently not significantly changed compared to their prior imaging. Patient is asymptomatic. Loaded with ASA and Plavix in the ED. Given another Plavix 300mg on  per Neurology. Continue ASA 81mg. Given Brilinta on  per Neuro since P2Y12 Assay is non-inhibitory. Underwent embolization of her right internal carotid artery saccular aneurysm with 2 coils and surpass evolve flow diverter device on 6/10 with Dr. Mayela Tesfaye. Plan is to continue ASA 81mg QD and Brilinta 90mg BID x6 months. Follow-up with Dr. Mayela Tesfaye in one month and 6 months. 2. Hx Brain Aneurysms: s/p coiling and  shunt placement in 2019.   3. Thrombocytopenia, mild: Appears chronic and is at baseline. No evidence of bleeding. 4. Elevated Troponin: Serial troponins have been flat. Suspect demand ischemia. Repeat EKG does not show any acute ischemic changes. Patient does not complain of CP.   5. HTN: Continue home meds. 6. Seizures: Continue Keppra 1000mg BID  7. HLD: Statin   8. Other: Discussed with PCP (Dr. Marisol Chowdhury) regarding patient being on Eliquis; he states she may have been on it in the past as DVT prophylaxis due to her prior ankle fracture. Discussed with Neurology who recommends to discontinue Eliquis as she is not indicated for it. No prior history of DVT/PE nor atrial fibrillation.   9. Acute Post-op Pulmonary Insufficiency, resolved    The patient was seen and examined on day of discharge and this discharge summary is in conjunction with any daily progress note from day of discharge. Hospital Course:   Patient is a 57 y/o Female with PMH Essential HTN, HLD, Seizures, Brain Aneurysms s/p  Shunt Placement in 2019. She presents to [facility] ED after having outpatient imaging that reported a 1.4cm saccular aneurysm along the anterolateral aspect of the cavernous right internal carotid artery projecting anteriorly towards the right orbital apex although unchanged compared to prior on 5/31/21. She states that she was advised by her Neurologist Dr. Bradley Persaud to get admitted for further evaluation and management. Neurology was notified by the ED Provider who spoke with Dr. Dominic Linares who recommended her start on Aspirin and Plavix 300mg load with plan for possible coiling on Friday. Patient herself states she feels well and has no complaints at this time. She denies any dizziness, lightheadedness, chest pain, palpitations, shortness of breath, abdominal pain, N/V/D.     She underwent embolization of her right internal carotid artery saccular aneurysm with 2 coils and surpass evolve flow diverter device on 6/10 with Dr. Dominic Linares. To be discharged with ASA 81mg QD and Brilinta 90mg BID x6 months. She is to follow-up with Dr. Dominic Linares in 1 month and 6 months. She is stable for discharge. All questions and concerns addressed. Exam:     Vitals:  Vitals:    06/13/22 0046 06/13/22 0340 06/13/22 0344 06/13/22 0745   BP: 135/86 122/61  (!) 153/96   Pulse:  82  100   Resp:  18  16   Temp:  98.4 °F (36.9 °C)  98.1 °F (36.7 °C)   TempSrc:  Oral  Oral   SpO2:  94%  96%   Weight:   (!) 330 lb 11 oz (150 kg)    Height:         Weight: Weight: (!) 330 lb 11 oz (150 kg)     24 hour intake/output:    Intake/Output Summary (Last 24 hours) at 6/13/2022 0818  Last data filed at 6/13/2022 0344  Gross per 24 hour   Intake 250 ml   Output 1625 ml   Net -1375 ml     General appearance:  No apparent distress, appears stated age and cooperative. HEENT:  Normal cephalic, atraumatic without obvious deformity.  Pupils equal, round, and reactive to light. Extra ocular muscles intact. Conjunctivae/corneas clear. Neck: Supple, with full range of motion. No jugular venous distention. Trachea midline. Respiratory:  Normal respiratory effort. Clear to auscultation, bilaterally without Rales/Wheezes/Rhonchi. Cardiovascular:  Regular rate and rhythm with normal S1/S2 without murmurs, rubs or gallops. Abdomen: Soft, non-tender, non-distended with normal bowel sounds. Musculoskeletal:  No clubbing, cyanosis or edema bilaterally. Full range of motion without deformity. Skin: Skin color, texture, turgor normal.  No rashes or lesions. Psychiatric:  Alert and oriented, thought content appropriate, normal insight  Capillary Refill: Brisk,< 3 seconds   Peripheral Pulses: +2 palpable, equal bilaterally     Labs: For convenience and continuity at follow-up the following most recent labs are provided:    CBC:    Lab Results   Component Value Date    WBC 7.8 06/13/2022    HGB 13.9 06/13/2022    HCT 43.0 06/13/2022     06/13/2022       Renal:    Lab Results   Component Value Date     06/13/2022    K 3.8 06/13/2022     06/13/2022    CO2 25 06/13/2022    BUN 14 06/13/2022    CREATININE 0.4 06/13/2022    CALCIUM 10.3 06/13/2022         Significant Diagnostic Studies    Radiology:   XR CHEST PORTABLE   Final Result   1. Low lung volumes. 2. Cardiomegaly. Question small left pleural effusion. This document has been electronically signed by: Kaci Zambrano MD on    06/10/2022 08:24 PM      IR NS THORACIC ARCH CATH PLACEMENT W OR WO ANGIOGRAPHY   Final Result      XR CHEST PORTABLE   Final Result   1. No acute cardiopulmonary finding. **This report has been created using voice recognition software. It may contain minor errors which are inherent in voice recognition technology. **      Final report electronically signed by Dr Maya Winter on 6/10/2022 8:25 AM             Consults:     IP CONSULT TO NEUROLOGY  IP CONSULT TO SOCIAL WORK    Disposition: Stephany  Condition at Discharge: Stable    Code Status:  Full Code     Patient Instructions:    Discharge lab work: None  Activity: activity as tolerated  Diet: ADULT DIET; Regular      Follow-up visits:   [facility] Nini Vargas  [phone]         Follow-up with Dr. Rani Vazquez in 1 month and 6 months.     Discharge Medications:        Medication List      START taking these medications    aspirin 81 MG chewable tablet  Take 1 tablet by mouth daily     ticagrelor 90 MG Tabs tablet  Commonly known as: BRILINTA  Take 1 tablet by mouth 2 times daily        CONTINUE taking these medications    acetaminophen 325 mg tablet  Commonly known as: TYLENOL     amLODIPine 5 MG tablet  Commonly known as: NORVASC     bisacodyl 10 MG suppository  Commonly known as: DULCOLAX     levETIRAcetam 1000 MG tablet  Commonly known as: KEPPRA  Take 1 tablet by mouth 2 times daily     Lipitor 20 MG tablet  Generic drug: atorvastatin     loratadine 10 MG tablet  Commonly known as: CLARITIN     losartan 100 MG tablet  Commonly known as: COZAAR     magnesium hydroxide 400 MG/5ML suspension  Commonly known as: MILK OF MAGNESIA     magnesium oxide 400 (241.3 Mg) MG Tabs tablet  Commonly known as: MAG-OX  Take 1 tablet by mouth 2 times daily     ondansetron 4 MG disintegrating tablet  Commonly known as: ZOFRAN-ODT     risperiDONE 0.5 MG tablet  Commonly known as: RISPERDAL     senna 8.6 MG tablet  Commonly known as: SENOKOT     Simethicone 80 MG Tabs        STOP taking these medications    Biofreeze 4 % Gel  Generic drug: Menthol (Topical Analgesic)     Eliquis 5 MG Tabs tablet  Generic drug: apixaban     ketoconazole 2 % cream  Commonly known as: NIZORAL     miconazole 2 % powder  Commonly known as: MICOTIN     nystatin 121115 UNIT/GM powder  Commonly known as: MYCOSTATIN           Where to Get Your Medications      You can get these medications from any pharmacy    Bring a paper prescription for each of these medications  · ticagrelor 90 MG Tabs tablet     Information about where to get these medications is not yet available    Ask your nurse or doctor about these medications  · aspirin 81 MG chewable tablet       Time Spent on discharge is more than 35 minutes in the examination, evaluation, counseling and review of medications and discharge plan. Signed: Thank you No primary care provider on file. for the opportunity to be involved in this patient's care.     Electronically signed by Hesham Sands DO on 6/13/2022 at 8:18 AM

## 2022-06-14 NOTE — ADT AUTH CERT
Clinical 6/10- 6/13 by Alpesh Arreola RN       Review Status Review Entered   In Primary 6/14/2022 10:47      Criteria Review   6/10/22      VS- temp 98.3, HR 89, RR 22, /72, O2 94%     O2- 6L NC     Labs- Plt 124,      CXR-  1. No acute cardiopulmonary finding.     EKG-  Normal sinus rhythm  Normal ECG  When compared with ECG of 08-JUN-2022 09:51,  Ventricular rate has increased BY  32 BPM  QT has lengthened     Internal medicine-  Patient is doing well and has no new focal neurological deficits. She denies any headaches, dizziness, lightheadedness.      She did require 2L oxygen overnight due to SpO2 87%. She denies any shortness of breath, dyspnea, cough. She appears euvolemic and net negative -2.9L since admission. She denies using oxygen at her nursing facility nor sleep apnea.      1. Saccular Aneurysm of Right ICA: 1.4cm noted on CTA Head on 5/31/21, full report in Media. Apparently not significantly changed compared to their prior imaging. Patient is asymptomatic. Loaded with ASA and Plavix in the ED. Given another Plavix 300mg on 6/8 per Neurology. Continue ASA 81mg. Given Brilinta on 6/9 per Neuro since P2Y12 Assay is non-inhibitory. Plan for Cerebral Angiogram with possible flow diverter today at 9AM. She will be transferred to ICU for post-management. Maintain BP <130/80. 2. Acute Hypoxic Respiratory Failure: Required 2L overnight as SpO2 was 87%. She appears euvolemic on exam, although has fine crackles to RUL. Not diagnosed with sleep apnea, but suspect DYLLAN or OHS although this is the first night she has required oxygen. Get CXR. Wean as tolerated. 3. Hx Brain Aneurysms: s/p coiling and  shunt placement in 2019.   4. Elevated Troponin: Serial troponins have been flat. Suspect demand ischemia. Repeat EKG does not show any acute ischemic changes. Patient does not complain of CP. Will monitor for now. 5. HTN: Continue home meds.  Hydralazine PRN to maintain BP <130/80. 6. Seizures: Continue Keppra 1000mg BID. Seizure, fall precautions. 7. HLD: Statin  8. Other: Discussed with PCP (Dr. Rigoberto Santizo) regarding patient being on Eliquis; he states she may have been on it in the past as DVT prophylaxis due to her prior ankle fracture. Discussed with Neurology who recommends to discontinue Eliquis as she is not indicated for it. No prior history of DVT/PE nor atrial fibrillation.        Neurology-  P2Y12 still not inhibited. Will switch to Brilinta.      1. 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 Also demonstrates anterior communicating artery coils/artifact. · Continue ASA 81mg daily  · Brilinta 180mg x1 this morning  · Scheduled for treatment of her aneurysm with anesthesia at 9am on 6/10/2022. · Written informed consent will be obtained from the patient's brother who is her medical power of . · VerifyNow/P2Y12 platelet inhibition test on 6/8/2022 and 6/10/22 did not show adequate inhibition  · Maintain BP <130/80  · Neurology following           Procedure-  Pre-Op Diagnosis: * Aneurysm embolization *  Right ICA cavernous aneurysm embolization     Anesthesia: General anesthesia     Estimated Blood Loss (mL): less than 20      Complications: None     Findings: Right ICA cavernous aneurysm measuing 16 ml with a neck measuring 3.6 ml     Successful embolization of the aneurysm        Summary- Patient in Neuro unit     Patient received Norvasc 5mg PO x1, ASA 81mg PO x1, Lipitor 20mg PO x1, Zyrtec 10mg PO x1, Keppra 1000mg PO x2, Cozaar 50mg Po x1, Risperdal 0.5mg PO x2, Senokot 8.6mg PO x1, Brilinta 180mg PO x1, Brilinta 90mg PO x1, Tylenol 650mg PO x1,                     6/11/22      VS- temp 98.4, HR 68, RR 16, /69, O2 94%     O2- 6L NC     Labs- Glucose 137, WBC 11.4, Plt 125     Intensivist-  1.  Acute postoperative pulmonary insufficiency: Patient was requiring 2 L prior to procedure.  Post procedure increased oxygen likely secondary to sedation patient does have possible DYLLAN.  Lungs are clear on exam.   Incentive spirometry. May need to see pulmonary in outpatient setting for follow up. Chest xray shows possible small left plural effusion.    2. Saccular aneurysm of the right internal carotid: Status post embolization of aneurysm with 2 coils and surpass evolve  flow diverter device.  Neurology is following.  Continue with ASA and Brilinta x1 at 180 mg this morning.  Neurology will manage antiplatelets. 3. Thrombocytopenia: Mild, platelets noted at 396 EH appears that she has chronic thrombocytopenia at baseline. 4. History of brain aneurysm: Status post coiling and  shunt placement in 2019.  5. Elevated troponin: Serial troponins have been flat, likely demand ischemia.  EKG shows no ischemic changes.  No chest pain on exam.  Monitor closely. 6. Hypertension: Continue home meds, maintain blood pressure less than 160.  7. Seizures: History, on Keppra 1 g twice daily  8. Hyperlipidemia: Statin  9. Morbid obesity: BMI 55, diet and exercise encouraged. 10. History of ankle fracture: Patient has history of ankle fracture and concern for DVT and was on Eliquis.  This was discussed with PCP per hospitalist.  This was stopped per neurology's recommendations.        Neurology-  Pt s/p successful treatment of aneurysm with 2 coils and flow diverter. Pt requiring 2L NC overnight. Pt states that she feels ok but has a headache and is tired. FERGUSON is characterized as R sided, retro-orbital pain at a 3/10. This is consistent with her frequent headaches.  She asks for dimmed lights but no other treatment at the time of exam. No other complaints.      1. 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 Venkata Figueroa  · CTA demonstrated 14mm saccular aneurysm months.   2. Hx Brain Aneurysms: s/p coiling and  shunt placement in 2019.   3. Thrombocytopenia, mild: Appears chronic and is at baseline. No evidence of bleeding.   4. Elevated Troponin: Serial troponins have been flat. Suspect demand ischemia. Repeat EKG does not show any acute ischemic changes. Patient does not complain of CP. Will monitor for now. 5. HTN: Continue home meds. Hydralazine PRN to maintain BP <130/80. 6. Seizures: Continue Keppra 1000mg BID. Seizure, fall precautions. 7. HLD: Statin   8. Other: Discussed with PCP (Dr. Estee Yousif) regarding patient being on Eliquis; he states she may have been on it in the past as DVT prophylaxis due to her prior ankle fracture. Discussed with Neurology who recommends to discontinue Eliquis as she is not indicated for it. No prior history of DVT/PE nor atrial fibrillation. 9. Acute Post-op Pulmonary Insufficiency, resolved     Expected discharge date:  1-2 days pending pre-cert back to [facility]           Neurology-  Patient reports that she is doing well today. Joshua Yamilet is resolved and no further need for oxygen.  She is looking forward to getting back to her of her facility.  Patient reports she had some itching at bilateral groin sites which were prepped for procedure, no bleeding or discomfort at the access site.     1. 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 Jonatan Padilla  · CTA demonstrated 14mm saccular aneurysm anteriorly projecting from cavernous segment of right ICA. Also demonstrates anterior communicating artery coils/artifact. · Continue ASA 81mg daily  · Continue Brilinta 90 BID x 6 months  · Plavix stopped as it was shown to be ineffective by P2Y12 inhibition testing. · Maintain BP <130/80  · Pt is ok for discharge back to her facility[facility].  Rx for Brilinta faxed to facility and they have communicated that it will be delivered from pharmacy once she is back. Facility educated about the importance of this medication and risk of stroke with missed doses. D/C will be pending pre-cert approval and at that time, [facility] must be able to ensure that their pharmacy will be able to deliver [address] before the next dose is due.   · Follow up appointments in outpatient Neurointerventional clinic with Dr. Elvia Hammond in 1 month and 6 months. Our office will schedule appt with Tracy Valdez and inform brother as well. ·       Summary- Patient in Neuro unit     Patient received Norvasc 5mg PO x1, ASA 81mg PO x1, Lipitor 20mg PO x1, Zyrtec 10mg PO x1, Keppra 1000mg PO x2, Cozaar 50mg PO x1, Risperdal 0.5mg PO x2, Senokot 8.6mg PO x2, Brilinta 90mg PO x2, Tylenol 650mg PO x1, Apresoline 10mg IV x1, Glycolax 17g PO x1,                        6/13/22      VS- temp 98.2, , RR 18, /85, O2 95%     Labs- Glucose 127, Plt 116        Internal medicine-  1. Saccular Aneurysm of Right ICA: 1.4cm noted on CTA Head on 5/31/21, full report in Media. Apparently not significantly changed compared to their prior imaging. Patient is asymptomatic. Loaded with ASA and Plavix in the ED. Given another Plavix 300mg on 6/8 per Neurology. Continue ASA 81mg. Given Brilinta on 6/9 per Neuro since P2Y12 Assay is non-inhibitory. Underwent embolization of her right internal carotid artery saccular aneurysm with 2 coils and surpass evolve flow diverter device on 6/10 with Dr. Elvia Hammond. Plan is to continue ASA 81mg QD and Brilinta 90mg BID x6 months. Follow-up with Dr. Elvia Hammond in one month and 6 months.   2. Hx Brain Aneurysms: s/p coiling and  shunt placement in 2019.   3. Thrombocytopenia, mild: Appears chronic and is at baseline. No evidence of bleeding.   4. Elevated Troponin: Serial troponins have been flat. Suspect demand ischemia. Repeat EKG does not show any acute ischemic changes. Patient does not complain of CP.   5. HTN: Continue home meds.   6. Seizures: Continue Keppra 1000mg BID  7. HLD: Statin   8. Other: Discussed with PCP (Dr. Belia Layne) regarding patient being on Eliquis; he states she may have been on it in the past as DVT prophylaxis due to her prior ankle fracture. Discussed with Neurology who recommends to discontinue Eliquis as she is not indicated for it. No prior history of DVT/PE nor atrial fibrillation. 9. Acute Post-op Pulmonary Insufficiency, resolved        Hospital Course:   Patient is a 57 y/o Female with PMH Essential HTN, HLD, Seizures, Brain Aneurysms s/p  Shunt Placement in 2019. She presents to [facility] ED after having outpatient imaging that reported a 1.4cm saccular aneurysm along the anterolateral aspect of the cavernous right internal carotid artery projecting anteriorly towards the right orbital apex although unchanged compared to prior on 5/31/21. She states that she was advised by her Neurologist Dr. Hugo Mathews to get admitted for further evaluation and management. Neurology was notified by the ED Provider who spoke with Dr. Lion Ross who recommended her start on Aspirin and Plavix 300mg load with plan for possible coiling on Friday. Patient herself states she feels well and has no complaints at this time. She denies any dizziness, lightheadedness, chest pain, palpitations, shortness of breath, abdominal pain, N/V/D.     She underwent embolization of her right internal carotid artery saccular aneurysm with 2 coils and surpass evolve flow diverter device on 6/10 with Dr. Lion Ross. To be discharged with ASA 81mg QD and Brilinta 90mg BID x6 months. She is to follow-up with Dr. Lion Ross in 1 month and 6 months. She is stable for discharge. All questions and concerns addressed.     Discharge lab work: None  Activity: activity as tolerated  Diet: ADULT DIET;  Regular          Summary- Patient in Neuro unit     Patient received Norvasc 5mg PO x1, ASA 81mg PO x1, Zyrtec 10mg PO x1, Keppra 1000mg PO x1, Cozaar 50mg PO x1, Risperdal 0.5mg PO x1, Senokot [phone]  _______________________________________________________________________________________

## 2022-06-16 ENCOUNTER — TELEPHONE (OUTPATIENT)
Dept: NEUROLOGY | Age: 62
End: 2022-06-16

## 2022-06-16 NOTE — TELEPHONE ENCOUNTER
Spoke with Elda Aldana from [facility] in [facility] and gave follow up appt for 8-2-2022 @ 3:30pm arrival.

## 2022-07-06 NOTE — PROGRESS NOTES
Comprehensive Nutrition Assessment    Type and Reason for Visit:  Reassess(eval and treat, low BUN and albumin, poor oral intake)    Nutrition Recommendations/Plan:   -ONS initiated: Ensure Enlive TID. -Recommend consider MVI and an appetite stimulant.  -Further diet recommendations per SLP. Nutrition Assessment:    Pt. with minimal improvement from a nutritional standpoint AEB diet initiated 3/22/21 after being NPO for 5 days, but very poor oral intake 0-25% of meals . Remains at risk for further nutritional compromise r/t continued poor appetite/intake, admit with neutropenic fever, sepsis, encephalopathy, s/p 3/22/21:  shunt revision, psychological concerns, and underlying medical condition (hx: brain aneurysm s/p  shunt, seizures, HTN, ). Nutrition recommendations/interventions as per above. Malnutrition Assessment:  Malnutrition Status: At risk for malnutrition (Comment)    Context:  Acute Illness     Findings of the 6 clinical characteristics of malnutrition:  Energy Intake:  7 - 50% or less of estimated energy requirements for 5 or more days  Weight Loss:  No significant weight loss(9.2% weight gain in the last 7 days, edema present)     Body Fat Loss:  No significant body fat loss     Muscle Mass Loss:  No significant muscle mass loss    Fluid Accumulation:  1 - Mild Extremities   Strength:  Not Performed    Estimated Daily Nutrient Needs:  Energy (kcal):  3299-5968 kcals (11-15); Weight Used for Energy Requirements:  (127 kgm 3/21)     Protein (g):  118+ grams (2+/kgm); Weight Used for Protein Requirements:  Ideal(59 kgm)        Fluid (ml/day):  ~1770 ml (30/kgm); Method Used for Fluid Requirements:  Other (Comment)(59 kgm)      Nutrition Related Findings:  s/p  shunt revision 3/22/21. Pt seen, she had sheet over her face. Asked if I could see her face, she removed covers and then began to cry. When asked why she was crying stated \" I get parinoid\".   Reports poor appetite since Yes past 3 months

## 2022-08-02 ENCOUNTER — OFFICE VISIT (OUTPATIENT)
Dept: NEUROLOGY | Age: 62
End: 2022-08-02
Payer: MEDICARE

## 2022-08-02 VITALS
DIASTOLIC BLOOD PRESSURE: 82 MMHG | BODY MASS INDEX: 48.82 KG/M2 | WEIGHT: 293 LBS | HEIGHT: 65 IN | SYSTOLIC BLOOD PRESSURE: 130 MMHG

## 2022-08-02 DIAGNOSIS — I67.1 ANEURYSM OF RIGHT INTERNAL CAROTID ARTERY: ICD-10-CM

## 2022-08-02 PROCEDURE — 99213 OFFICE O/P EST LOW 20 MIN: CPT | Performed by: SOCIAL WORKER

## 2022-08-02 RX ORDER — ASPIRIN 81 MG/1
81 TABLET, CHEWABLE ORAL DAILY
Qty: 30 TABLET | Refills: 3 | Status: SHIPPED | OUTPATIENT
Start: 2022-08-02

## 2022-08-02 ASSESSMENT — VISUAL ACUITY: VA_NORMAL: 1

## 2022-08-02 ASSESSMENT — ENCOUNTER SYMPTOMS
COUGH: 0
NAUSEA: 0
DIARRHEA: 0
VOMITING: 0
PHOTOPHOBIA: 0
COLOR CHANGE: 0
SHORTNESS OF BREATH: 0
TROUBLE SWALLOWING: 0

## 2022-08-02 NOTE — PROGRESS NOTES
Outpatient Neurology Progress Note    Date:8/2/2022         Patient Jenn Franco     YOB: 1960     Age:62 y.o.    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 Pt is on 81mg ASA and 90 Brilinta 90mg BID and reports that she is doing well. She denies unilateral weakness, vision change, facial droop. She does endorse headaches and tremors which are managed by her primary neurologist Dr. Sakina Obando. Review of Systems   Review of Systems   Constitutional:  Negative for activity change, fatigue and fever. HENT:  Negative for tinnitus and trouble swallowing. Eyes:  Negative for photophobia and visual disturbance. Respiratory:  Negative for cough and shortness of breath. Cardiovascular:  Negative for chest pain and palpitations. Gastrointestinal:  Negative for diarrhea, nausea and vomiting. Genitourinary:  Negative for dysuria and flank pain. Musculoskeletal:  Negative for neck pain and neck stiffness. Skin:  Negative for color change and rash. Neurological:  Positive for tremors, weakness (Chronic BLE) and headaches. Negative for dizziness, facial asymmetry, speech difficulty and numbness. Psychiatric/Behavioral:  Negative for agitation and confusion. Medications     Current Outpatient Medications   Medication Sig Dispense Refill    ticagrelor (BRILINTA) 90 MG TABS tablet Take 1 tablet by mouth in the morning and 1 tablet before bedtime. 60 tablet 5    aspirin 81 MG chewable tablet Take 1 tablet by mouth in the morning.  30 tablet 3    acetaminophen (TYLENOL) 500 MG tablet Take 1,000 mg by mouth every 6 hours as needed ondansetron (ZOFRAN-ODT) 4 MG disintegrating tablet Take 4 mg by mouth every 6 hours as needed for Nausea or Vomiting       loratadine (CLARITIN) 10 MG tablet Take 1 tablet by mouth daily      bisacodyl (DULCOLAX) 10 MG suppository 10 mg daily as needed for Constipation       levETIRAcetam (KEPPRA) 1000 MG tablet Take 1 tablet by mouth 2 times daily 60 tablet 0    magnesium oxide (MAG-OX) 400 (241.3 Mg) MG TABS tablet Take 1 tablet by mouth 2 times daily 30 tablet     risperiDONE (RISPERDAL) 0.5 MG tablet Take 0.5 mg by mouth 2 times daily      senna (SENOKOT) 8.6 MG tablet Take 1 tablet by mouth 2 times daily      amLODIPine (NORVASC) 5 MG tablet Take 5 mg by mouth daily      atorvastatin (LIPITOR) 20 MG tablet Take 20 mg by mouth nightly      losartan (COZAAR) 100 MG tablet Take 50 mg by mouth daily       Simethicone 80 MG TABS Take 80 mg by mouth every 8 hours as needed       No current facility-administered medications for this visit. Past History    Past Medical History:   has a past medical history of Brain aneurysm, Hypertension, and Seizures ([address] Social History:   reports that she quit smoking about 6 years ago. Her smoking use included cigarettes. She has never used smokeless tobacco. She reports that she does not drink alcohol and does not use drugs. Family History:   Family History   Problem Relation Age of Onset    Cancer Mother         Breast, Lung    Diabetes Father     Cancer Father         colon    Alzheimer's Disease Father     ADHD Father        Physical Examination      Vitals:  /82 (Site: Left Lower Arm, Position: Sitting, Cuff Size: Large Adult)   Ht 5' 5\" (1.651 m)   Wt (!) 330 lb (149.7 kg)   BMI 54.91 kg/m²   No data recorded. Physical Exam  Vitals reviewed. Constitutional:       Appearance: She is obese. HENT:      Head: Normocephalic and atraumatic.       Right Ear: External ear normal.      Left Ear: External ear normal.      Nose: Nose normal. Mouth/Throat:      Mouth: Mucous membranes are moist.      Pharynx: Oropharynx is clear. Eyes:      Extraocular Movements: Extraocular movements intact and EOM normal.      Pupils: Pupils are equal, round, and reactive to light. Cardiovascular:      Rate and Rhythm: Normal rate and regular rhythm. Pulmonary:      Effort: Pulmonary effort is normal. No respiratory distress. Breath sounds: Normal breath sounds. Musculoskeletal:      Cervical back: Normal range of motion and neck supple. Skin:     General: Skin is warm and dry. Neurological:      Mental Status: She is alert. Coordination: Finger-Nose-Finger Test normal.      Deep Tendon Reflexes:      Reflex Scores:       Bicep reflexes are 2+ on the right side and 2+ on the left side. Brachioradialis reflexes are 2+ on the right side and 2+ on the left side. Patellar reflexes are 1+ on the right side and 1+ on the left side. Psychiatric:         Mood and Affect: Mood normal.         Speech: Speech normal.         Behavior: Behavior normal.         Thought Content: Thought content normal.     Neurologic Exam     Mental Status   Oriented to person. Oriented to place. Disoriented to time. Attention: normal. Concentration: normal.   Speech: speech is normal   Level of consciousness: alert  Able to name object. Able to repeat. Cranial Nerves     CN II   Visual fields full to confrontation. Visual acuity: normal  Right visual field deficit: none  Left visual field deficit: none     CN III, IV, VI   Pupils are equal, round, and reactive to light. Extraocular motions are normal.   Right pupil: Size: 2 mm. Shape: regular. Reactivity: brisk. Left pupil: Size: 2 mm. Shape: regular. Reactivity: brisk. CN V   Facial sensation intact. Right facial sensation deficit: none  Left facial sensation deficit: none    CN VII   Facial expression full, symmetric.    Right facial weakness: none  Left facial weakness: none    CN VIII Hearing: intact    CN IX, X   Palate: symmetric    CN XI   Right sternocleidomastoid strength: normal  Left sternocleidomastoid strength: normal  Right trapezius strength: normal  Left trapezius strength: normal    CN XII   Tongue: not atrophic  Fasciculations: absent  Tongue deviation: none    Motor Exam   Muscle bulk: normalBUE: 4/5    BLE: 1/5, chronic     Sensory Exam   Light touch normal.     Gait, Coordination, and Reflexes     Coordination   Finger to nose coordination: normal    Tremor   Resting tremor: present    Reflexes   Right brachioradialis: 2+  Left brachioradialis: 2+  Right biceps: 2+  Left biceps: 2+  Right patellar: 1+  Left patellar: 1+     Labs/Imaging/Diagnostics   Labs:  CBC:No results for input(s): WBC, RBC, HGB, HCT, MCV, RDW, PLT in the last 72 hours. CHEMISTRIES:No results for input(s): NA, K, CL, CO2, BUN, CREATININE, GLUCOSE, CA, PHOS, MG in the last 72 hours. PT/INR:No results for input(s): PROTIME, INR in the last 72 hours. APTT:No results for input(s): APTT in the last 72 hours. LIVER PROFILE:No results for input(s): AST, ALT, BILIDIR, BILITOT, ALKPHOS in the last 72 hours. Imaging Last 24 Hours:  No results found. Assessment and Plan          Right Internal Carotid Artery saccular aneurysm  s/p coiling and flow diversion device  Continue ASA 81mg daily  Continue Brilinta 90 mg BID  Follow up angiogram in October with Dr. Fernando La. Benefits and risks of the procedure discussed and pt is agreeable to proceed. Continue follow up with Dr. Johanna Ballard for headache and tremor management. This patient was seen and evaluated with Dr. Fernando La and he is in agreement with the assessment and plan.       Electronically signed by Fely Mcqueen PA-C on 8/2/22

## 2022-08-16 NOTE — PLAN OF CARE
Problem: Discharge Planning  Goal: Discharge to home or other facility with appropriate resources  Outcome: Progressing  Note: Discharge planning in process and discussed with patient/family. Social work consulted for any additional needs. Care manager aware of discharge needs. Problem: Safety - Adult  Goal: Free from fall injury  Outcome: Progressing  Note: Patient remained free from falls this shift. Bed is in low position with alarm on and siderails up x2. Education given on use of call light and patient voiced understanding. Call light and beside table within reach. Arm band and falling star in place. Hourly rounds completed. Will continue to monitor. Problem: ABCDS Injury Assessment  Goal: Absence of physical injury  Outcome: Progressing  Flowsheets (Taken 6/7/2022 2159)  Absence of Physical Injury: Implement safety measures based on patient assessment     Problem: Skin/Tissue Integrity  Goal: Absence of new skin breakdown  Description: 1. Monitor for areas of redness and/or skin breakdown  2. Assess vascular access sites hourly  3. Every 4-6 hours minimum:  Change oxygen saturation probe site  4. Every 4-6 hours:  If on nasal continuous positive airway pressure, respiratory therapy assess nares and determine need for appliance change or resting period. Outcome: Progressing  Note: No signs of skin breakdown. Skin warm, dry, and intact. Mucous membranes pink and moist.  Assistance with turns/ambulation provided PRN. Will continue to monitor. Care plan reviewed with patient. Patient verbalizes understanding of the plan of care and contributed to goal setting. Reached out to patient and followed up with him to get scheduled but is not due tell 5/14/2023- every 2 yrs. LARA

## 2022-10-17 ENCOUNTER — APPOINTMENT (OUTPATIENT)
Dept: CARDIAC CATH/INVASIVE PROCEDURES | Age: 62
End: 2022-10-17
Payer: MEDICARE

## 2022-10-17 ENCOUNTER — HOSPITAL ENCOUNTER (OUTPATIENT)
Dept: INPATIENT UNIT | Age: 62
Discharge: HOME | End: 2022-10-17
Attending: PSYCHIATRY & NEUROLOGY | Admitting: PSYCHIATRY & NEUROLOGY
Payer: MEDICARE

## 2022-10-17 ENCOUNTER — APPOINTMENT (OUTPATIENT)
Dept: INTERVENTIONAL RADIOLOGY/VASCULAR | Age: 62
End: 2022-10-17
Attending: PSYCHIATRY & NEUROLOGY
Payer: MEDICARE

## 2022-10-17 VITALS
OXYGEN SATURATION: 93 % | RESPIRATION RATE: 24 BRPM | WEIGHT: 293 LBS | DIASTOLIC BLOOD PRESSURE: 82 MMHG | SYSTOLIC BLOOD PRESSURE: 153 MMHG | TEMPERATURE: 97.7 F | HEART RATE: 69 BPM | HEIGHT: 66 IN | BODY MASS INDEX: 47.09 KG/M2

## 2022-10-17 LAB
ABO: NORMAL
ANION GAP SERPL CALCULATED.3IONS-SCNC: 14 MEQ/L (ref 8–16)
ANTIBODY SCREEN: NORMAL
APTT: 33.9 SECONDS (ref 22–38)
BUN BLDV-MCNC: 13 MG/DL (ref 7–22)
CALCIUM SERPL-MCNC: 11 MG/DL (ref 8.5–10.5)
CHLORIDE BLD-SCNC: 103 MEQ/L (ref 98–111)
CO2: 24 MEQ/L (ref 23–33)
CREAT SERPL-MCNC: 0.6 MG/DL (ref 0.4–1.2)
ERYTHROCYTE [DISTWIDTH] IN BLOOD BY AUTOMATED COUNT: 12.2 % (ref 11.5–14.5)
ERYTHROCYTE [DISTWIDTH] IN BLOOD BY AUTOMATED COUNT: 47.6 FL (ref 35–45)
GFR SERPL CREATININE-BSD FRML MDRD: > 90 ML/MIN/1.73M2
GLUCOSE BLD-MCNC: 136 MG/DL (ref 70–108)
HCT VFR BLD CALC: 43.6 % (ref 37–47)
HEMOGLOBIN: 14.4 GM/DL (ref 12–16)
INR BLD: 1.06 (ref 0.85–1.13)
MCH RBC QN AUTO: 35 PG (ref 26–33)
MCHC RBC AUTO-ENTMCNC: 33 GM/DL (ref 32.2–35.5)
MCV RBC AUTO: 105.8 FL (ref 81–99)
PLATELET # BLD: 120 THOU/MM3 (ref 130–400)
PMV BLD AUTO: 11.3 FL (ref 9.4–12.4)
POTASSIUM SERPL-SCNC: 4.2 MEQ/L (ref 3.5–5.2)
RBC # BLD: 4.12 MILL/MM3 (ref 4.2–5.4)
RH FACTOR: NORMAL
SODIUM BLD-SCNC: 141 MEQ/L (ref 135–145)
WBC # BLD: 7.4 THOU/MM3 (ref 4.8–10.8)

## 2022-10-17 PROCEDURE — C1769 GUIDE WIRE: HCPCS

## 2022-10-17 PROCEDURE — C1887 CATHETER, GUIDING: HCPCS

## 2022-10-17 PROCEDURE — 86901 BLOOD TYPING SEROLOGIC RH(D): CPT

## 2022-10-17 PROCEDURE — 36415 COLL VENOUS BLD VENIPUNCTURE: CPT

## 2022-10-17 PROCEDURE — 36226 PLACE CATH VERTEBRAL ART: CPT

## 2022-10-17 PROCEDURE — 2500000003 HC RX 250 WO HCPCS

## 2022-10-17 PROCEDURE — 6360000002 HC RX W HCPCS

## 2022-10-17 PROCEDURE — 86900 BLOOD TYPING SEROLOGIC ABO: CPT

## 2022-10-17 PROCEDURE — 6360000002 HC RX W HCPCS: Performed by: PSYCHIATRY & NEUROLOGY

## 2022-10-17 PROCEDURE — 2580000003 HC RX 258: Performed by: PHYSICIAN ASSISTANT

## 2022-10-17 PROCEDURE — 2500000003 HC RX 250 WO HCPCS: Performed by: PSYCHIATRY & NEUROLOGY

## 2022-10-17 PROCEDURE — 85027 COMPLETE CBC AUTOMATED: CPT

## 2022-10-17 PROCEDURE — C1894 INTRO/SHEATH, NON-LASER: HCPCS

## 2022-10-17 PROCEDURE — C1760 CLOSURE DEV, VASC: HCPCS

## 2022-10-17 PROCEDURE — 85730 THROMBOPLASTIN TIME PARTIAL: CPT

## 2022-10-17 PROCEDURE — 85610 PROTHROMBIN TIME: CPT

## 2022-10-17 PROCEDURE — 6360000004 HC RX CONTRAST MEDICATION: Performed by: PSYCHIATRY & NEUROLOGY

## 2022-10-17 PROCEDURE — 36225 PLACE CATH SUBCLAVIAN ART: CPT

## 2022-10-17 PROCEDURE — 80048 BASIC METABOLIC PNL TOTAL CA: CPT

## 2022-10-17 PROCEDURE — 36224 PLACE CATH CAROTD ART: CPT

## 2022-10-17 PROCEDURE — 86850 RBC ANTIBODY SCREEN: CPT

## 2022-10-17 RX ORDER — SODIUM CHLORIDE 0.9 % (FLUSH) 0.9 %
5-40 SYRINGE (ML) INJECTION EVERY 12 HOURS SCHEDULED
Status: DISCONTINUED | OUTPATIENT
Start: 2022-10-17 | End: 2022-10-17 | Stop reason: HOSPADM

## 2022-10-17 RX ORDER — FENTANYL CITRATE 50 UG/ML
50 INJECTION, SOLUTION INTRAMUSCULAR; INTRAVENOUS ONCE
Status: COMPLETED | OUTPATIENT
Start: 2022-10-17 | End: 2022-10-17

## 2022-10-17 RX ORDER — ACETAMINOPHEN 325 MG/1
650 TABLET ORAL EVERY 4 HOURS PRN
Status: DISCONTINUED | OUTPATIENT
Start: 2022-10-17 | End: 2022-10-17 | Stop reason: HOSPADM

## 2022-10-17 RX ORDER — MIDAZOLAM HYDROCHLORIDE 1 MG/ML
1 INJECTION INTRAMUSCULAR; INTRAVENOUS ONCE
Status: COMPLETED | OUTPATIENT
Start: 2022-10-17 | End: 2022-10-17

## 2022-10-17 RX ORDER — SODIUM CHLORIDE 0.9 % (FLUSH) 0.9 %
5-40 SYRINGE (ML) INJECTION PRN
Status: DISCONTINUED | OUTPATIENT
Start: 2022-10-17 | End: 2022-10-17 | Stop reason: SDUPTHER

## 2022-10-17 RX ORDER — SODIUM CHLORIDE 0.9 % (FLUSH) 0.9 %
5-40 SYRINGE (ML) INJECTION EVERY 12 HOURS SCHEDULED
Status: DISCONTINUED | OUTPATIENT
Start: 2022-10-17 | End: 2022-10-17 | Stop reason: SDUPTHER

## 2022-10-17 RX ORDER — SODIUM CHLORIDE 9 MG/ML
INJECTION, SOLUTION INTRAVENOUS PRN
Status: DISCONTINUED | OUTPATIENT
Start: 2022-10-17 | End: 2022-10-17 | Stop reason: HOSPADM

## 2022-10-17 RX ORDER — SODIUM CHLORIDE 0.9 % (FLUSH) 0.9 %
5-40 SYRINGE (ML) INJECTION PRN
Status: DISCONTINUED | OUTPATIENT
Start: 2022-10-17 | End: 2022-10-17 | Stop reason: HOSPADM

## 2022-10-17 RX ORDER — LABETALOL 20 MG/4 ML (5 MG/ML) INTRAVENOUS SYRINGE
10 ONCE
Status: COMPLETED | OUTPATIENT
Start: 2022-10-17 | End: 2022-10-17

## 2022-10-17 RX ORDER — SODIUM CHLORIDE 9 MG/ML
INJECTION, SOLUTION INTRAVENOUS PRN
Status: DISCONTINUED | OUTPATIENT
Start: 2022-10-17 | End: 2022-10-17 | Stop reason: SDUPTHER

## 2022-10-17 RX ADMIN — SODIUM CHLORIDE: 9 INJECTION, SOLUTION INTRAVENOUS at 11:12

## 2022-10-17 RX ADMIN — FENTANYL CITRATE 50 MCG: 50 INJECTION, SOLUTION INTRAMUSCULAR; INTRAVENOUS at 13:01

## 2022-10-17 RX ADMIN — LABETALOL 20 MG/4 ML (5 MG/ML) INTRAVENOUS SYRINGE 10 MG: at 13:17

## 2022-10-17 RX ADMIN — MIDAZOLAM 1 MG: 1 INJECTION INTRAMUSCULAR; INTRAVENOUS at 13:01

## 2022-10-17 RX ADMIN — IOPAMIDOL 50 ML: 612 INJECTION, SOLUTION INTRAVENOUS at 13:29

## 2022-10-17 ASSESSMENT — ENCOUNTER SYMPTOMS
RHINORRHEA: 0
SORE THROAT: 0
ABDOMINAL PAIN: 0
SHORTNESS OF BREATH: 0
COUGH: 0
PHOTOPHOBIA: 0
NAUSEA: 0
VOMITING: 0

## 2022-10-17 ASSESSMENT — LIFESTYLE VARIABLES
HOW MANY STANDARD DRINKS CONTAINING ALCOHOL DO YOU HAVE ON A TYPICAL DAY: PATIENT DOES NOT DRINK
HOW OFTEN DO YOU HAVE A DRINK CONTAINING ALCOHOL: NEVER

## 2022-10-17 NOTE — PROCEDURES
PREOPERATIVE DIAGNOSIS: Cerebral aneurysm     POSTOPERATIVE DIAGNOSIS: Same     SURGEONS:  Dulce Christopher MD     ANESTHESIA: Lidocaine 1% for local anesthesia. Moderate sedation     MEDICATIONS: Heparin 4000 units/liter was used for saline flushes     ESTIMATED BLOOD LOSS: 10 cc     COMPLICATIONS: None     HEMOSTASIS: 6 Western Kathy Angioseal and the arteriotomy site was covered with a sterile dressing     PROCEDURES PERFORMED:  1.  4 vessel diagnostic cerebral angiogram     CATHETERIZATION OF THE FOLLOWING VESSELS:  1. Right internal carotid artery  2. Right vertebral artery  3. Left internal carotid artery  4. Left vertebral artery        ANGIOGRAPHY AND INTERPRETATION OF THE FOLLOWING IMAGES:     1. Right internal carotid artery - cranial station - AP and lateral angiograms  2. Right internal carotid artery cranial station oblique and lateral working position angiogram  3. Right vertebral artery - cranial station - AP and lateral angiograms  4. Left internal carotid artery - cranial station - AP, Oblique and lateral angiograms  5. Left vertebral artery - cranial station - AP and lateral angiogams  6. Limited right common femoral artery - AP angiogram     INDICATIONS: Ms. 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 .  A catheter cerebral angiogram is being performed for further evaluation. The purpose, alternative modalities, risks, and benefits of the procedure were discussed with the patient and her family and an informed consent was signed. DESCRIPTION OF THE PROCEDURE AND FINDINGS: The patient was brought to the angiography suite and placed supine on the table. Both groins were then prepped and draped in the usual sterile fashion. Bony landmarks were identified, and an imaginary line between the pubic tubercle and the anterior superior iliac spine was identified on the right.  This corresponds to the right inguinal ligament. Four fingerbreadths below the right inguinal ligament the right femoral artery pulsations were palpated. Lidocaine 1% was infiltrated into the skin and deeper tissues surrounding the femoral artery. Access to the right femoral artery was obtained using the modified Seldinger technique and an 18 gauge thinwall needle. Pulsatile bright red blood was returned. A J-wire was then used to place a 5-Ugandan long introducer sheath into the right common iliac artery. The sheath was then attached to continuous heparinized flush drip. A 5-Ugandan Terumo angle guide catheter was then introduced through the sheath over a 0.035 glide wire. All bubbles were then meticulously withdrawn and the catheter carefully flushed. All subsequent vessel selection was done under fluoroscopy using iodinated contrast material, and all final images were obtained using digital subtraction angiography. A four-vessel cerebral angiogram was performed during the course of this examination. The diagnostic catheter was first advanced under direct fluoroscopic visualization into the aortic arch and then into the right internal carotid artery under fluoroscopic guidance where AP and lateral angiograms from the cranial station were obtained using hand-injection. The distal cervical, petrous, laceral, cavernous, and supraclinoid carotid segments appear normal.  The previously coiled right internal carotid artery cavernous segment aneurysm has no residual filling. The ophthalmic and small anterior choroidal arteries are visualized and appear normal. The middle cerebral artery (MCA) and its terminal branches are unremarkable. The anterior cerebral artery (JEFF) and distal segments fill robustly. The previously embolized anterior communicating artery aneurysm has no residual filling. There are no AVMs or stenotic lesions. Transit times are normal and the venous phase is unremarkable.      The catheter was then positioned in the right vertebral artery (VA) under direct fluoroscopic visualization where AP and lateral angiograms from the cranial station were obtained using hand-injection. The images demonstrate robust filling of the nondominant distal right vertebral artery, vertebrobasilar junction, basilar artery and its branches. Bilateral PICA s, AICAs, and superior cerebellar arteries (SCAs) and bilateral posterior cerebral arteries (PCA) appear unremarkable. There are no aneurysms or vascular malformations identified in the posterior circulation. The venous phase is unremarkable. Next , the diagnostic catheter was advanced in the left internal carotid artery where AP, oblique and lateral angiograms from the cranial station were obtained using hand-injection. The distal cervical, petrous, laceral cavernous, and supraclinoid carotid segments appear normal. The ophthalmic and small anterior choroidal arteries are visualized and appear normal. The middle cerebral artery (MCA) and its terminal branches are unremarkable. The anterior cerebral artery (JEFF) and distal segments fill robustly. There is flash filling across the anterior communicating artery (ACoA) to the contralateral JEFF distributions. There are no aneurysms or AVM's. Transit times are normal and the venous phase is unremarkable. The catheter was next positioned in the left external carotid artery. PA and lateral angiograms were perfromed. This angiogram         The catheter was then moved to the left vertebral artery (VA) under direct fluoroscopic visualization where AP and lateral angiograms from the cranial station were obtained using hand-injection. The images demonstrate robust filling of the dominant distal left vertebral artery, vertebrobasilar junction, basilar artery and its branches. The left posterior inferior cerebellar artery (PICA) has an intracranial origin and course.  Bilateral superior cerebellar arteries (SCAs) and bilateral posterior cerebral arteries (PCA) appear unremarkable. There was faint filling of bilateral posterior communicating arteries (PCoA). There are no aneurysms or vascular malformations identified in the posterior circulation. The venous phase is unremarkable. The catheter was removed from the system. An angiogram of the right common femoral and external iliac arteries was performed to confirm that the sheath was not in the superficial femoral branch. The 5-[place] long sheath introducer was removed and 6 Western Kathy Angioseal was successfully deployed and hemostasis was achieved. A sterile dressing was placed and the patient was transferred back to the ICU in stable condition. There were no acute complications and the patient tolerated the procedure well. Dr. Dianne Barron was present for the entire procedure and subsequent image interpretation. IMPRESSION:     1. The previously embolized right internal carotid artery cavernous segment aneurysm has no residual filling [place] / Cicero Trever 1  2. The previously coiled anterior communicating artery aneurysm has no residual filling [place] / Cicero Trever 1  3.   No other aneurysms detected in this four-vessel angiogram.

## 2022-10-17 NOTE — DISCHARGE INSTRUCTIONS
Reviewed with the patient post cerebral angiogram instructions. Discussed care of puncture site:  remove dressing in 24 hours, gently clean site with soap and water, pat dry, and apply bandaid daily for 5 days. Keep site clean and dry. Do not apply any creams, lotions, or powders to puncture site. Do not submerse site in water (no tub baths, swimming, or whirlpool) for 5 days. Take it easy for the next 3-5 days. No driving for 3 days. Avoid heaving lifting, pushing, pulling or straining. Monitor site for bleeding, drainage, or swelling. Monitor for signs of infection which include:  redness, drainage, soreness, or temp greater than 101 F. Notify doctor of any abnormal findings as listed. If bleeding occurs, hold firm pressure at site and call 911.

## 2022-10-17 NOTE — BRIEF OP NOTE
Brief Postoperative Note      Patient: Jocelyne Ruiz  YOB: 1960  MRN: [id number]    Date of Procedure:   October 17, 2022    Pre-Op Diagnosis: Cerebral aneurysm/diagnostic cerebral arteriogram    Post-Op Diagnosis: Same           diagnostic cerebral arteriogram    Assistant:  None    Anesthesia: Local/moderate sedation    Estimated Blood Loss (mL): Minimal    Complications: None    Specimens:   None    Implants:  None  Drains: None    Findings:   Previously coiled anterior cerebral artery/anterior communicating aneurysm has no residual filling  Previously embolized right internal carotid artery cavernous aneurysm has no residual filling    Electronically signed by Chris Iyer MD on 10/17/2022 at 1:32 PM

## 2022-10-17 NOTE — PROGRESS NOTES
Patient underwent diagnostic cerebral angiogram with conscious sedation with Dr. Nadia Barrios and tolerated well. Findings include:  -Previously coiled anterior cerebral artery/anterior communicating aneurysm without residual filling  -Previously embolized right internal carotid artery cavernous aneurysm without residual filling     No outpatient neuro interventional follow-up necessary at this time. Patient able to be discharged following 3 hours of strict bedrest, as angioseal was utilized. Continue ASA 81 daily and Brilinta 90 BID. Follow-up with outpatient neurology (Dr. Berenice Jensen) as scheduled. Please call office with any questions or concerns. Report to the ED for prompt evaluation of any new focal neurologic symptoms/deficits.      Electronically signed by Haritha Vidales PA-C on 10/17/22 at 2:09 PM EDT

## 2022-10-17 NOTE — NURSE NAVIGATOR
[id number] [facility] consent, H&P, and/or immediate pre-procedure note completed  1236 Staff involved in the case- DORA Berry RN; ANNALISA Woods RN; Stephan Fitzgerald, RT; Lord Dolan, RT; Dr. Alfred Aguila  [phone] Patient received in cath lab for procedure family taken to waiting room. Neuro assessment A&O to person, place, and situation.   (Abbreviated neuro checks, vital signs, and pulse oximeter every 5 minutes documented in flow sheets  1239 Pre-procedure peripheral pulse,  right pedal 1, right posterior tibial 1, left pedal 1, left posterior tibial 1  1242 Patient prepped for procedure  1300 Procedure started with Dr. Alfred Aguila. Timeout performed and the following information was verified: correct patient, correct procedure, correct procedure site, correct position, correct laterality (if applicable), procedure site marked and visible (if applicable), and consents verified. Allergies reviewed. Pertinent diagnostic and radiologic results present and relevant images available (if applicable). All special equipment or special requirements available as applicable. Prophylactic antibiotics given as applicable. Fire risk safety assessment completed, shared with team and appropriate interventions implemented. 1301 Fentanyl and Versed given per Cash Martino RN.   1303 Lidocaine 5 mg given per Dr. Alfred Aguila in right groin. 1305 Access obtained at right femoral artery via 5 fr sheath  1310 Angiogram right vertebral artery  1311 Angiogram right internal carotid artery  1316 BP remains elevated. Dr. Alfred Aguila aware and verbal order for labetolol received. 1317 10 mg labetolol given by Cash Martino RN.  1318 Angiogram Left internal Carotid artery  1320 Angiogram left subclavian artery   1323 Angiogram Left vertebral artery  1325 Angiogram right femoral artery  1325 Angioseal deployed per Dr. Alfred Aguila right femoral artery. Sheath removed and intact  1326 Procedure completed; patient tolerated well.  1331 Femoral site; area soft to touch with no bleeding noted. 1331 Post-procedure peripheral pulse,  right pedal 1, right posterior tibial 1, left pedal 1, left posterior tibial 1  1332 Femoral dressing remains dry and intact with area soft.  Neuro assessment A&O person, place, and situation  Last set of vitals with O2 per flowsheet Jackelyn Score10  1333 Patient on bed, patient exits procedural suite  1335 Patient taken to 2E11    Xray time- 5.7 minutes   391 mGy  Sedation time- 24 minutes (First dose until procedure end time)  Total contrast- 50 ml's

## 2022-10-17 NOTE — H&P
Interventional Neurology Pre-Procedural H&P Note    Date:10/17/2022       LLEA:[id number]-[id number]  Patient Landen Alfred     Date of Birth:[birth year]     Age:62 y.o. Physician: Kristen Swift MD     Procedure:  Diagnostic cerebral angiogram with conscious sedation    Chief Complaint: Right internal carotid artery aneurysm      Marino Del Real is a 58 y.o. female with a history of HTN, seizures, ruptured anterior communicating artery aneurysm in 2019 s/p coiling and  shunt who [facility] to undergo a diagnostic cerebral angiogram with Dr. Jaime Wolf to evaluate a 14 mm right cavernous segment ICA aneurysm s/p coiling and flow diverter placement 6/10/22. She is on ASA 81 mg daily and Brilinta 90 mg BID. She is a former smoker. She reports doing well, only complaining of bilateral upper extremity tremors and headaches, which are managed as an outpatient with her neurologist, Dr. Kady Williamson. She denies current headache. She denies unilateral weakness, numbness or tingling, facial droop, vision changes, dizziness, difficulty speaking or understanding, chest pain, difficulty breathing, or recent fevers or illnesses. She does admit to anxiety related to the procedure, but wishes to proceed today. She reports medication compliance with ASA and Brilinta without concerns. She denies any changes in medications or medical history since last visit in neuro interventional outpatient clinic. Review of Systems   Review of Systems   Constitutional:  Negative for chills and fever. HENT:  Negative for rhinorrhea and sore throat. Eyes:  Negative for photophobia and visual disturbance. Respiratory:  Negative for cough and shortness of breath. Cardiovascular:  Negative for chest pain and palpitations. Gastrointestinal:  Negative for abdominal pain, nausea and vomiting. Genitourinary:  Negative for dysuria.         Chronic urinary incontinence   Musculoskeletal:  Negative for arthralgias and myalgias. Skin:  Negative for rash. Neurological:  Positive for tremors, weakness (bilateral lower extremity, chronic) and headaches (chronic). Negative for dizziness, facial asymmetry, speech difficulty and numbness. Psychiatric/Behavioral:  The patient is nervous/anxious. Medications   Scheduled Meds:    sodium chloride flush  5-40 mL IntraVENous 2 times per day     Continuous Infusions:    sodium chloride       PRN Meds: sodium chloride flush, sodium chloride  Medications Prior to Admission:   No current facility-administered medications on file prior to encounter. Current Outpatient Medications on File Prior to Encounter   Medication Sig Dispense Refill    ticagrelor (BRILINTA) 90 MG TABS tablet Take 1 tablet by mouth in the morning and 1 tablet before bedtime. 60 tablet 5    aspirin 81 MG chewable tablet Take 1 tablet by mouth in the morning.  30 tablet 3    acetaminophen (TYLENOL) 500 MG tablet Take 1,000 mg by mouth every 6 hours as needed      ondansetron (ZOFRAN-ODT) 4 MG disintegrating tablet Take 4 mg by mouth every 6 hours as needed for Nausea or Vomiting       loratadine (CLARITIN) 10 MG tablet Take 1 tablet by mouth daily      bisacodyl (DULCOLAX) 10 MG suppository 10 mg daily as needed for Constipation       levETIRAcetam (KEPPRA) 1000 MG tablet Take 1 tablet by mouth 2 times daily 60 tablet 0    magnesium oxide (MAG-OX) 400 (241.3 Mg) MG TABS tablet Take 1 tablet by mouth 2 times daily 30 tablet     risperiDONE (RISPERDAL) 0.5 MG tablet Take 0.5 mg by mouth 2 times daily (Patient not taking: Reported on 10/10/2022)      senna (SENOKOT) 8.6 MG tablet Take 1 tablet by mouth 2 times daily      amLODIPine (NORVASC) 5 MG tablet Take 5 mg by mouth daily      atorvastatin (LIPITOR) 20 MG tablet Take 20 mg by mouth nightly      losartan (COZAAR) 100 MG tablet Take 50 mg by mouth daily       Simethicone 80 MG TABS Take 80 mg by mouth every 8 hours as needed       Past History Past Medical History:   has a past medical history of Brain aneurysm, Hypertension, and Seizures ([address] Social History:   reports that she quit smoking about 6 years ago. Her smoking use included cigarettes. She has never used smokeless tobacco. She reports that she does not drink alcohol and does not use drugs. Family History:   Family History   Problem Relation Age of Onset    Cancer Mother         Breast, Lung    Diabetes Father     Cancer Father         colon    Alzheimer's Disease Father     ADHD Father        Physical Examination      Vitals:  Temp 97.7 °F (36.5 °C) (Oral)   Temp (24hrs), Av.7 °F (36.5 °C), Min:97.7 °F (36.5 °C), Max:97.7 °F (36.5 °C)      I/O (24Hr): No intake or output data in the 24 hours ending 10/17/22 1016      Physical Exam  Vitals reviewed. Constitutional:       General: She is not in acute distress. Appearance: She is obese. She is not ill-appearing. HENT:      Head: Normocephalic and atraumatic. Right Ear: External ear normal.      Left Ear: External ear normal.      Nose: Nose normal.      Mouth/Throat:      Mouth: Mucous membranes are moist.      Pharynx: No oropharyngeal exudate or posterior oropharyngeal erythema. Eyes:      Extraocular Movements: Extraocular movements intact and EOM normal.      Pupils: Pupils are equal, round, and reactive to light. Cardiovascular:      Rate and Rhythm: Normal rate and regular rhythm. Heart sounds: Normal heart sounds. No murmur heard. Comments: Radial pulses 2+ bilaterally, PT/DP pulses 1+ bilaterally, requiring doppler assistance  Pulmonary:      Effort: Pulmonary effort is normal. No respiratory distress. Breath sounds: Normal breath sounds. No wheezing. Abdominal:      General: Bowel sounds are normal.      Palpations: Abdomen is soft. Tenderness: There is no abdominal tenderness. Musculoskeletal:         General: Normal range of motion. Cervical back: Normal range of motion. Right lower leg: Edema present. Left lower leg: Edema present. Skin:     General: Skin is warm. Findings: No rash. Neurological:      Mental Status: She is alert and oriented to person, place, and time. Coordination: Finger-Nose-Finger Test and Heel to [employer] normal.   Psychiatric:         Mood and Affect: Mood normal.         Speech: Speech normal.         Behavior: Behavior normal.     Neurologic Exam     Mental Status   Oriented to person, place, and time. Follows 1 step commands. Attention: normal. Concentration: normal.   Speech: speech is normal   Level of consciousness: alert  Able to repeat. Normal comprehension. Cranial Nerves     CN II   Visual fields full to confrontation. Right visual field deficit: none  Left visual field deficit: none     CN III, IV, VI   Pupils are equal, round, and reactive to light. Extraocular motions are normal.   Right pupil: Shape: regular. Reactivity: brisk. Left pupil: Shape: regular. Reactivity: brisk. CN V   Facial sensation intact. Right facial sensation deficit: none  Left facial sensation deficit: none    CN VII   Facial expression full, symmetric.    Right facial weakness: none  Left facial weakness: none    CN VIII   CN VIII normal.   Hearing: intact    CN IX, X   CN IX normal.   CN X normal.   Palate: symmetric    CN XI   CN XI normal.   Right trapezius strength: normal  Left trapezius strength: normal    CN XII   CN XII normal.   Tongue: not atrophic  Fasciculations: absent  Tongue deviation: none    Motor Exam   Right arm pronator drift: absent  Left arm pronator drift: absent  Muscle strength:  BUE: 4+/5  BLE: 3/5 proximally, 3+/5 distally     Sensory Exam   Light touch normal.     Gait, Coordination, and Reflexes     Coordination   Finger to nose coordination: normal  Heel to shin coordination: normal    Tremor   Resting tremor: present  Action tremor: left arm and right arm         Mallampati Score: III          ASA Score: 2         Labs/Imaging/Diagnostics   Labs:  CBC:No results for input(s): WBC, RBC, HGB, HCT, MCV, RDW, PLT in the last 72 hours. CHEMISTRIES:No results for input(s): NA, K, CL, CO2, BUN, CREATININE, GLUCOSE, CA, PHOS, MG in the last 72 hours. COAGULATION STUDIES:No results for input(s): PROTIME, INR, APTT in the last 72 hours. LIVER PROFILE:No results for input(s): AST, ALT, BILIDIR, BILITOT, ALKPHOS in the last 72 hours. CHOLESTEROL AND A1C:No results for input(s): LDLCALC, HDL, CHOL, TRIG, LABA1C in the last 720 hours. Imaging Last 24 Hours:  No results found. Assessment and Plan:        Right internal carotid artery aneurysm  The risks and benefits of the procedure were explained to the patient. Risks include, but are not limited to, stroke, hemorrhage, dissection, nephrotoxicity, allergic reaction, bleeding, hematoma and infection. Written informed consent was obtained from her POA. Patient/family's questions were answered. Will proceed with diagnostic cerebral angiogram with conscious sedation today with Dr. Keena Tompkins for further evaluation of right internal carotid artery aneurysm s/p coiling and flow diversion device. Remain NPO until following the procedure. May take morning medications with a sip of water. Further recommendations pending diagnostic cerebral angiogram    This patient was seen & evaluated in conjunction with Dr. Keena Tompkins who is in agreement with assessment and plan.      Electronically signed by Oliver Bhatia PA-C on 10/17/22 at 11:46 AM EDT

## 2022-10-17 NOTE — PROGRESS NOTES
Returned to 2E11. Monitor attached showing SR. Dressing to right groin dry and intact. No bleeding, swelling, or edema noted. 9NSS infusing with approx 700 ml remaining.

## 2022-10-17 NOTE — PROGRESS NOTES
Patient admitted to [address] [place] [address]  Ambulatory for cerebral angiogram.  Patient NPO. Patient accompanied by family. Vital signs obtained. Assessment and data collection intiated. Oriented to room. Policies and procedures for 2E explained. All questions answered with no further questions at this time. Fall prevention and safety precautions discussed with patient.

## (undated) DEVICE — PREP SOL PVP IODINE 4%  4 OZ/BTL

## (undated) DEVICE — SPONGE GZ W4XL4IN COT 12 PLY TYP VII WVN C FLD DSGN

## (undated) DEVICE — CATHETER IV 16 GAX32.5 IN TRPL BVL LUERLOCK TIP ANGIOCATH

## (undated) DEVICE — ADDG DRILL BIT

## (undated) DEVICE — GAUZE,SPONGE,8"X4",12PLY,XRAY,STRL,LF: Brand: MEDLINE

## (undated) DEVICE — PACK,UNIVERSAL,NO GOWNS: Brand: MEDLINE

## (undated) DEVICE — Z INACTIVE NO SUPPLIER IDENTIFIED TUBING INSUFFLATION FILTER

## (undated) DEVICE — SUTURE NRLN SZ 4-0 L18IN NONABSORBABLE BLK L13MM TF 1/2 CIR C584D

## (undated) DEVICE — 1010 S-DRAPE TOWEL DRAPE 10/BX: Brand: STERI-DRAPE™

## (undated) DEVICE — SUTURE VCRL + SZ 4-0 L27IN ABSRB WHT FS-2 3/8 CIR REV CUT VCP422H

## (undated) DEVICE — CATHETER PERI STR OPN END RADPQ 120CM LNG

## (undated) DEVICE — ROYAL SILK SURGICAL GOWN, XXL: Brand: CONVERTORS

## (undated) DEVICE — UNIVERSAL PACK: Brand: CONVERTORS

## (undated) DEVICE — TROCAR: Brand: KII SHIELDED BLADED ACCESS SYSTEM

## (undated) DEVICE — AGENT HEMSTAT W2XL14IN OXIDIZED REGENERATED CELOS ABSRB FOR

## (undated) DEVICE — RESERVOIR 20MM W/ HYDR SPRUNG PROGAV 2.0

## (undated) DEVICE — SYRINGE MED 10ML LUERLOCK TIP W/O SFTY DISP

## (undated) DEVICE — GLOVE ORANGE PI 8   MSG9080

## (undated) DEVICE — SUTURE PERMA-HAND SZ 2-0 L30IN NONABSORBABLE BLK L26MM SH K833H

## (undated) DEVICE — C-ARMOR C-ARM EQUIPMENT COVERS CLEAR STERILE UNIVERSAL FIT 12 PER CASE: Brand: C-ARMOR

## (undated) DEVICE — SWAB MEDICATED TINC BENZ

## (undated) DEVICE — CODMAN® DISPOSABLE PERFORATOR 14MM: Brand: CODMAN®

## (undated) DEVICE — CODMAN® SURGICAL PATTIES 1/2" X 1/2" (1.27CM X 1.27CM): Brand: CODMAN®

## (undated) DEVICE — SUTURE VCRL SZ 3-0 L18IN ABSRB VLT L26MM SH 1/2 CIR J774D

## (undated) DEVICE — SUTURE PERMA-HAND SZ 3-0 L18IN 17 STRND NONABSORBABLE BLK SA64H

## (undated) DEVICE — AGENT HEMSTAT W2XL4IN OXIDIZED REGENERATED CELOS ABSRB SFT

## (undated) DEVICE — OIL CARTRIDGE: Brand: CORE, MAESTRO

## (undated) DEVICE — Z DISCONTINUED BY MEDLINE USE 2711682 TRAY SKIN PREP DRY W/ PREM GLV

## (undated) DEVICE — SOLUTION ANTIFOG VIS SYS CLEARIFY LAPSCP

## (undated) DEVICE — 3M™ STERI-DRAPE™ INSTRUMENT POUCH 1018: Brand: STERI-DRAPE™

## (undated) DEVICE — Z DUPLICATE USE 2431315 SET INSUF TBNG HI FLO W/ SMK EVAC FOR PNEUMOCLEAR

## (undated) DEVICE — BOOT,SUTURE,STANDARD,YELLOW-IN-BLUE: Brand: MEDLINE

## (undated) DEVICE — SOLUTION SCRB 32OZ 7.5% POVIDONE IOD BTL GENTLE EFFECTIVE

## (undated) DEVICE — GOWN,SIRUS,NONRNF,SETINSLV,XL,20/CS: Brand: MEDLINE

## (undated) DEVICE — SET SPRUNG RESVR W/ DSTL CATH

## (undated) DEVICE — 35 ML SYRINGE LUER-LOCK TIP: Brand: MONOJECT

## (undated) DEVICE — INSUFFLATION NEEDLE TO ESTABLISH PNEUMOPERITONEUM.: Brand: INSUFFLATION NEEDLE

## (undated) DEVICE — DIFFUSER: Brand: CORE, MAESTRO

## (undated) DEVICE — TROCAR: Brand: KII FIOS FIRST ENTRY

## (undated) DEVICE — INTENDED FOR TISSUE SEPARATION, AND OTHER PROCEDURES THAT REQUIRE A SHARP SURGICAL BLADE TO PUNCTURE OR CUT.: Brand: BARD-PARKER SAFETY BLADES SIZE 11, STERILE

## (undated) DEVICE — SUTURE VCRL SZ 2-0 L18IN ABSRB VLT L26MM SH 1/2 CIR J775D

## (undated) DEVICE — 3M™ STERI-DRAPE™ INCISE DRAPE 1050 (60CM X 45CM): Brand: STERI-DRAPE™

## (undated) DEVICE — WAX SURG 2.5GM HEMSTAT BNE BEESWAX PARAFFIN ISO PALMITATE

## (undated) DEVICE — AGENT HEMOSTATIC SURGIFLOW MATRIX KIT W/THROMBIN

## (undated) DEVICE — TROCAR: Brand: KII® SLEEVE

## (undated) DEVICE — PACK PROCEDURE SURG SET UP SRMC

## (undated) DEVICE — TOWEL,OR,DSP,ST,BLUE,STD,4/PK,20PK/CS: Brand: MEDLINE

## (undated) DEVICE — 3M™ IOBAN™ 2 ANTIMICROBIAL INCISE DRAPE 6650EZ: Brand: IOBAN™ 2

## (undated) DEVICE — CODMAN® DISPOSABLE SPLIT TROCAR: Brand: CODMAN®

## (undated) DEVICE — INTEGRA® MILTEX® OSTRUM PUNCH 4", BACK-BITING, STRAIGHT BITE: Brand: INTEGRA® MILTEX®

## (undated) DEVICE — PAD GEN USE BORDERED ADH 14IN 2IN AND 12IN 4IN GZ UNIV ST

## (undated) DEVICE — SYRINGE IRRIG 60ML SFT PLIABLE BLB EZ TO GRP 1 HND USE W/

## (undated) DEVICE — SUTURE ETHLN SZ 3-0 L18IN NONABSORBABLE BLK FS-1 L24MM 3/8 663H

## (undated) DEVICE — GLOVE ORANGE PI 8 1/2   MSG9085

## (undated) DEVICE — TUBING, SUCTION, 1/4" X 20', STRAIGHT: Brand: MEDLINE INDUSTRIES, INC.

## (undated) DEVICE — CARBIDE MATCH HEAD

## (undated) DEVICE — Device

## (undated) DEVICE — 1016 S-DRAPE IRRIG POUCH 10/BOX: Brand: STERI-DRAPE™

## (undated) DEVICE — SUTURE ETHLN SZ 3-0 L30IN NONABSORBABLE BLK L36MM FSLX 3/8 1673BH

## (undated) DEVICE — GOWN,ECLIPSE,POLYRNF,W/TWL,XL,30/CS: Brand: MEDLINE

## (undated) DEVICE — HYPODERMIC SAFETY NEEDLE: Brand: MAGELLAN

## (undated) DEVICE — BASIC SINGLE BASIN BTC-LF: Brand: MEDLINE INDUSTRIES, INC.

## (undated) DEVICE — SOLUTION SURG PREP POV IOD 7.5% 4 OZ

## (undated) DEVICE — CODMAN® SURGICAL PATTIES 1/2" X1 1/2" (1.27CM X 3.81CM): Brand: CODMAN®